# Patient Record
Sex: FEMALE | Race: ASIAN | NOT HISPANIC OR LATINO | Employment: OTHER | URBAN - METROPOLITAN AREA
[De-identification: names, ages, dates, MRNs, and addresses within clinical notes are randomized per-mention and may not be internally consistent; named-entity substitution may affect disease eponyms.]

---

## 2018-10-11 ENCOUNTER — TRANSFERRED RECORDS (OUTPATIENT)
Dept: HEALTH INFORMATION MANAGEMENT | Facility: CLINIC | Age: 73
End: 2018-10-11
Payer: MEDICARE

## 2018-10-18 ENCOUNTER — TRANSFERRED RECORDS (OUTPATIENT)
Dept: HEALTH INFORMATION MANAGEMENT | Facility: CLINIC | Age: 73
End: 2018-10-18
Payer: MEDICARE

## 2021-06-03 ENCOUNTER — RECORDS - HEALTHEAST (OUTPATIENT)
Dept: ADMINISTRATIVE | Facility: CLINIC | Age: 76
End: 2021-06-03

## 2022-02-09 ENCOUNTER — HOSPITAL ENCOUNTER (INPATIENT)
Facility: HOSPITAL | Age: 77
LOS: 2 days | Discharge: HOME | DRG: 191 | End: 2022-02-12
Attending: EMERGENCY MEDICINE | Admitting: INTERNAL MEDICINE
Payer: MEDICARE

## 2022-02-09 ENCOUNTER — APPOINTMENT (OUTPATIENT)
Dept: CT IMAGING | Facility: HOSPITAL | Age: 77
DRG: 191 | End: 2022-02-09
Attending: EMERGENCY MEDICINE
Payer: MEDICARE

## 2022-02-09 DIAGNOSIS — J44.1 COPD EXACERBATION (H): ICD-10-CM

## 2022-02-09 DIAGNOSIS — K59.01 SLOW TRANSIT CONSTIPATION: ICD-10-CM

## 2022-02-09 DIAGNOSIS — F51.01 PRIMARY INSOMNIA: Primary | ICD-10-CM

## 2022-02-09 DIAGNOSIS — B96.20 E. COLI UTI: ICD-10-CM

## 2022-02-09 DIAGNOSIS — N39.0 E. COLI UTI: ICD-10-CM

## 2022-02-09 LAB
ALBUMIN UR-MCNC: NEGATIVE MG/DL
ANION GAP SERPL CALCULATED.3IONS-SCNC: 12 MMOL/L (ref 5–18)
APPEARANCE UR: CLEAR
ATRIAL RATE - MUSE: 97 BPM
BACTERIA #/AREA URNS HPF: ABNORMAL /HPF
BASOPHILS # BLD AUTO: 0.1 10E3/UL (ref 0–0.2)
BASOPHILS NFR BLD AUTO: 1 %
BILIRUB UR QL STRIP: NEGATIVE
BNP SERPL-MCNC: 55 PG/ML (ref 0–137)
BUN SERPL-MCNC: 12 MG/DL (ref 8–28)
C REACTIVE PROTEIN LHE: 4.9 MG/DL (ref 0–0.8)
CALCIUM SERPL-MCNC: 8.6 MG/DL (ref 8.5–10.5)
CHLORIDE BLD-SCNC: 104 MMOL/L (ref 98–107)
CO2 SERPL-SCNC: 20 MMOL/L (ref 22–31)
COLOR UR AUTO: ABNORMAL
CREAT SERPL-MCNC: 0.96 MG/DL (ref 0.6–1.1)
DIASTOLIC BLOOD PRESSURE - MUSE: NORMAL MMHG
EOSINOPHIL # BLD AUTO: 0.1 10E3/UL (ref 0–0.7)
EOSINOPHIL NFR BLD AUTO: 0 %
ERYTHROCYTE [DISTWIDTH] IN BLOOD BY AUTOMATED COUNT: 13.7 % (ref 10–15)
FLUAV RNA SPEC QL NAA+PROBE: NEGATIVE
FLUBV RNA RESP QL NAA+PROBE: NEGATIVE
GFR SERPL CREATININE-BSD FRML MDRD: 61 ML/MIN/1.73M2
GLUCOSE BLD-MCNC: 127 MG/DL (ref 70–125)
GLUCOSE UR STRIP-MCNC: NEGATIVE MG/DL
HCT VFR BLD AUTO: 39.1 % (ref 35–47)
HGB BLD-MCNC: 12.3 G/DL (ref 11.7–15.7)
HGB UR QL STRIP: ABNORMAL
IMM GRANULOCYTES # BLD: 0.1 10E3/UL
IMM GRANULOCYTES NFR BLD: 0 %
INTERPRETATION ECG - MUSE: NORMAL
KETONES UR STRIP-MCNC: NEGATIVE MG/DL
LACTATE SERPL-SCNC: 1.7 MMOL/L (ref 0.7–2)
LEUKOCYTE ESTERASE UR QL STRIP: ABNORMAL
LYMPHOCYTES # BLD AUTO: 2.3 10E3/UL (ref 0.8–5.3)
LYMPHOCYTES NFR BLD AUTO: 16 %
MCH RBC QN AUTO: 27 PG (ref 26.5–33)
MCHC RBC AUTO-ENTMCNC: 31.5 G/DL (ref 31.5–36.5)
MCV RBC AUTO: 86 FL (ref 78–100)
MONOCYTES # BLD AUTO: 1.7 10E3/UL (ref 0–1.3)
MONOCYTES NFR BLD AUTO: 12 %
NEUTROPHILS # BLD AUTO: 10.8 10E3/UL (ref 1.6–8.3)
NEUTROPHILS NFR BLD AUTO: 71 %
NITRATE UR QL: NEGATIVE
NRBC # BLD AUTO: 0 10E3/UL
NRBC BLD AUTO-RTO: 0 /100
P AXIS - MUSE: 72 DEGREES
PH UR STRIP: 5.5 [PH] (ref 5–7)
PLATELET # BLD AUTO: 266 10E3/UL (ref 150–450)
POTASSIUM BLD-SCNC: 3.7 MMOL/L (ref 3.5–5)
PR INTERVAL - MUSE: 138 MS
QRS DURATION - MUSE: 88 MS
QT - MUSE: 322 MS
QTC - MUSE: 408 MS
R AXIS - MUSE: 69 DEGREES
RBC # BLD AUTO: 4.56 10E6/UL (ref 3.8–5.2)
RBC URINE: 1 /HPF
SARS-COV-2 RNA RESP QL NAA+PROBE: NEGATIVE
SODIUM SERPL-SCNC: 136 MMOL/L (ref 136–145)
SP GR UR STRIP: >1.05 (ref 1–1.03)
SQUAMOUS EPITHELIAL: 3 /HPF
SYSTOLIC BLOOD PRESSURE - MUSE: NORMAL MMHG
T AXIS - MUSE: 78 DEGREES
TROPONIN I SERPL-MCNC: <0.01 NG/ML (ref 0–0.29)
UROBILINOGEN UR STRIP-MCNC: <2 MG/DL
VENTRICULAR RATE- MUSE: 97 BPM
WBC # BLD AUTO: 15 10E3/UL (ref 4–11)
WBC URINE: 17 /HPF

## 2022-02-09 PROCEDURE — 85014 HEMATOCRIT: CPT | Performed by: EMERGENCY MEDICINE

## 2022-02-09 PROCEDURE — 250N000013 HC RX MED GY IP 250 OP 250 PS 637: Performed by: INTERNAL MEDICINE

## 2022-02-09 PROCEDURE — C9803 HOPD COVID-19 SPEC COLLECT: HCPCS

## 2022-02-09 PROCEDURE — 83880 ASSAY OF NATRIURETIC PEPTIDE: CPT | Performed by: EMERGENCY MEDICINE

## 2022-02-09 PROCEDURE — 86140 C-REACTIVE PROTEIN: CPT | Performed by: EMERGENCY MEDICINE

## 2022-02-09 PROCEDURE — 81001 URINALYSIS AUTO W/SCOPE: CPT | Performed by: EMERGENCY MEDICINE

## 2022-02-09 PROCEDURE — 87086 URINE CULTURE/COLONY COUNT: CPT | Performed by: EMERGENCY MEDICINE

## 2022-02-09 PROCEDURE — 82374 ASSAY BLOOD CARBON DIOXIDE: CPT | Performed by: EMERGENCY MEDICINE

## 2022-02-09 PROCEDURE — 94640 AIRWAY INHALATION TREATMENT: CPT

## 2022-02-09 PROCEDURE — 93005 ELECTROCARDIOGRAM TRACING: CPT | Performed by: EMERGENCY MEDICINE

## 2022-02-09 PROCEDURE — 83605 ASSAY OF LACTIC ACID: CPT | Performed by: EMERGENCY MEDICINE

## 2022-02-09 PROCEDURE — 87636 SARSCOV2 & INF A&B AMP PRB: CPT | Performed by: EMERGENCY MEDICINE

## 2022-02-09 PROCEDURE — 84443 ASSAY THYROID STIM HORMONE: CPT | Performed by: INTERNAL MEDICINE

## 2022-02-09 PROCEDURE — 99285 EMERGENCY DEPT VISIT HI MDM: CPT | Mod: 25

## 2022-02-09 PROCEDURE — 71275 CT ANGIOGRAPHY CHEST: CPT

## 2022-02-09 PROCEDURE — 96374 THER/PROPH/DIAG INJ IV PUSH: CPT | Mod: 59

## 2022-02-09 PROCEDURE — G0378 HOSPITAL OBSERVATION PER HR: HCPCS

## 2022-02-09 PROCEDURE — 250N000009 HC RX 250: Performed by: INTERNAL MEDICINE

## 2022-02-09 PROCEDURE — 250N000013 HC RX MED GY IP 250 OP 250 PS 637: Performed by: EMERGENCY MEDICINE

## 2022-02-09 PROCEDURE — 83036 HEMOGLOBIN GLYCOSYLATED A1C: CPT | Performed by: INTERNAL MEDICINE

## 2022-02-09 PROCEDURE — 250N000009 HC RX 250: Performed by: EMERGENCY MEDICINE

## 2022-02-09 PROCEDURE — 36415 COLL VENOUS BLD VENIPUNCTURE: CPT | Performed by: EMERGENCY MEDICINE

## 2022-02-09 PROCEDURE — 84484 ASSAY OF TROPONIN QUANT: CPT | Performed by: EMERGENCY MEDICINE

## 2022-02-09 PROCEDURE — 99220 PR INITIAL OBSERVATION CARE,LEVEL III: CPT | Performed by: INTERNAL MEDICINE

## 2022-02-09 PROCEDURE — 82947 ASSAY GLUCOSE BLOOD QUANT: CPT | Performed by: EMERGENCY MEDICINE

## 2022-02-09 PROCEDURE — 250N000011 HC RX IP 250 OP 636: Performed by: EMERGENCY MEDICINE

## 2022-02-09 PROCEDURE — 87040 BLOOD CULTURE FOR BACTERIA: CPT | Performed by: EMERGENCY MEDICINE

## 2022-02-09 RX ORDER — ALBUTEROL SULFATE 90 UG/1
2 AEROSOL, METERED RESPIRATORY (INHALATION) EVERY 6 HOURS PRN
Status: DISCONTINUED | OUTPATIENT
Start: 2022-02-09 | End: 2022-02-12 | Stop reason: HOSPADM

## 2022-02-09 RX ORDER — ACETAMINOPHEN 325 MG/1
650 TABLET ORAL EVERY 6 HOURS PRN
Status: DISCONTINUED | OUTPATIENT
Start: 2022-02-09 | End: 2022-02-12 | Stop reason: HOSPADM

## 2022-02-09 RX ORDER — ALBUTEROL SULFATE 0.83 MG/ML
2.5 SOLUTION RESPIRATORY (INHALATION)
Status: DISCONTINUED | OUTPATIENT
Start: 2022-02-09 | End: 2022-02-12 | Stop reason: HOSPADM

## 2022-02-09 RX ORDER — ALBUTEROL SULFATE 5 MG/ML
2.5 SOLUTION RESPIRATORY (INHALATION) ONCE
Status: COMPLETED | OUTPATIENT
Start: 2022-02-09 | End: 2022-02-09

## 2022-02-09 RX ORDER — IOPAMIDOL 755 MG/ML
100 INJECTION, SOLUTION INTRAVASCULAR ONCE
Status: COMPLETED | OUTPATIENT
Start: 2022-02-09 | End: 2022-02-09

## 2022-02-09 RX ORDER — METHYLPREDNISOLONE SODIUM SUCCINATE 40 MG/ML
40 INJECTION, POWDER, LYOPHILIZED, FOR SOLUTION INTRAMUSCULAR; INTRAVENOUS DAILY
Status: DISCONTINUED | OUTPATIENT
Start: 2022-02-10 | End: 2022-02-11

## 2022-02-09 RX ORDER — ATORVASTATIN CALCIUM 40 MG/1
40 TABLET, FILM COATED ORAL DAILY
Status: DISCONTINUED | OUTPATIENT
Start: 2022-02-10 | End: 2022-02-12 | Stop reason: HOSPADM

## 2022-02-09 RX ORDER — METHYLPREDNISOLONE SODIUM SUCCINATE 125 MG/2ML
125 INJECTION, POWDER, LYOPHILIZED, FOR SOLUTION INTRAMUSCULAR; INTRAVENOUS ONCE
Status: COMPLETED | OUTPATIENT
Start: 2022-02-09 | End: 2022-02-09

## 2022-02-09 RX ORDER — ASPIRIN 81 MG/1
81 TABLET ORAL DAILY
Status: DISCONTINUED | OUTPATIENT
Start: 2022-02-10 | End: 2022-02-12 | Stop reason: HOSPADM

## 2022-02-09 RX ORDER — IPRATROPIUM BROMIDE AND ALBUTEROL SULFATE 2.5; .5 MG/3ML; MG/3ML
3 SOLUTION RESPIRATORY (INHALATION) ONCE
Status: COMPLETED | OUTPATIENT
Start: 2022-02-09 | End: 2022-02-09

## 2022-02-09 RX ORDER — DOXYCYCLINE 100 MG/1
100 CAPSULE ORAL 2 TIMES DAILY
Status: DISCONTINUED | OUTPATIENT
Start: 2022-02-09 | End: 2022-02-12 | Stop reason: HOSPADM

## 2022-02-09 RX ORDER — ASPIRIN 81 MG/1
81 TABLET ORAL DAILY
COMMUNITY

## 2022-02-09 RX ORDER — METHYLPREDNISOLONE SODIUM SUCCINATE 125 MG/2ML
60 INJECTION, POWDER, LYOPHILIZED, FOR SOLUTION INTRAMUSCULAR; INTRAVENOUS ONCE
Status: DISCONTINUED | OUTPATIENT
Start: 2022-02-09 | End: 2022-02-10

## 2022-02-09 RX ORDER — IPRATROPIUM BROMIDE AND ALBUTEROL SULFATE 2.5; .5 MG/3ML; MG/3ML
3 SOLUTION RESPIRATORY (INHALATION)
Status: DISCONTINUED | OUTPATIENT
Start: 2022-02-09 | End: 2022-02-10

## 2022-02-09 RX ORDER — AMLODIPINE BESYLATE 5 MG/1
5 TABLET ORAL DAILY
Status: DISCONTINUED | OUTPATIENT
Start: 2022-02-10 | End: 2022-02-12 | Stop reason: HOSPADM

## 2022-02-09 RX ORDER — ATORVASTATIN CALCIUM 40 MG/1
40 TABLET, FILM COATED ORAL DAILY
COMMUNITY
End: 2022-04-20

## 2022-02-09 RX ADMIN — IPRATROPIUM BROMIDE AND ALBUTEROL SULFATE 3 ML: 2.5; .5 SOLUTION RESPIRATORY (INHALATION) at 23:22

## 2022-02-09 RX ADMIN — ALBUTEROL SULFATE 2.5 MG: 2.5 SOLUTION RESPIRATORY (INHALATION) at 17:27

## 2022-02-09 RX ADMIN — ALBUTEROL SULFATE 2 PUFF: 90 AEROSOL, METERED RESPIRATORY (INHALATION) at 15:43

## 2022-02-09 RX ADMIN — DOXYCYCLINE 100 MG: 100 CAPSULE ORAL at 20:25

## 2022-02-09 RX ADMIN — IPRATROPIUM BROMIDE AND ALBUTEROL SULFATE 3 ML: 2.5; .5 SOLUTION RESPIRATORY (INHALATION) at 16:07

## 2022-02-09 RX ADMIN — METHYLPREDNISOLONE SODIUM SUCCINATE 125 MG: 125 INJECTION, POWDER, FOR SOLUTION INTRAMUSCULAR; INTRAVENOUS at 16:08

## 2022-02-09 RX ADMIN — ACETAMINOPHEN 650 MG: 325 TABLET ORAL at 20:25

## 2022-02-09 RX ADMIN — IOPAMIDOL 100 ML: 755 INJECTION, SOLUTION INTRAVENOUS at 15:08

## 2022-02-09 ASSESSMENT — MIFFLIN-ST. JEOR: SCORE: 1031.14

## 2022-02-09 ASSESSMENT — ENCOUNTER SYMPTOMS
CONFUSION: 0
ARTHRALGIAS: 1
HEADACHES: 0
COLOR CHANGE: 0
NECK STIFFNESS: 0
ABDOMINAL PAIN: 0
EYE REDNESS: 0
COUGH: 1
SHORTNESS OF BREATH: 1
DIFFICULTY URINATING: 0
FEVER: 0

## 2022-02-09 ASSESSMENT — ACTIVITIES OF DAILY LIVING (ADL): DEPENDENT_IADLS:: TRANSPORTATION

## 2022-02-09 NOTE — PHARMACY-ADMISSION MEDICATION HISTORY
Pharmacy Note - Admission Medication History    Pertinent Provider Information: Medication history provided via daughter     ______________________________________________________________________    Prior To Admission (PTA) med list completed and updated in EMR.       PTA Med List   Medication Sig Last Dose     acetaminophen (TYLENOL) 325 MG tablet [ACETAMINOPHEN (TYLENOL) 325 MG TABLET] Take 650 mg by mouth every 6 (six) hours as needed for pain. Past Month at Unknown time     albuterol (PROVENTIL HFA;VENTOLIN HFA) 90 mcg/actuation inhaler [ALBUTEROL (PROVENTIL HFA;VENTOLIN HFA) 90 MCG/ACTUATION INHALER] Inhale 2 puffs every 6 (six) hours as needed for wheezing. 2/9/2022 at am     amLODIPine (NORVASC) 5 MG tablet [AMLODIPINE (NORVASC) 5 MG TABLET] Take 5 mg by mouth daily. 2/9/2022 at am     aspirin 81 MG EC tablet Take 81 mg by mouth daily 2/9/2022 at am     atorvastatin (LIPITOR) 40 MG tablet Take 40 mg by mouth daily 2/8/2022 at pm       Information source(s): Family member  Method of interview communication: phone    Summary of Changes to PTA Med List  New: atorvastatin, aspirin   Discontinued: symbicort, monetlukast, cetirizine  Changed: -    Patient was asked about OTC/herbal products specifically.  PTA med list reflects this.    In the past week, patient estimated taking medication this percent of the time:  50-90% due to daughter not aware of compliance .    Allergies were reviewed, assessed, and updated with the patient.      Patient did not bring any medications to the hospital and can't retrieve from home. No multi-dose medications are available for use during hospital stay.     The information provided in this note is only as accurate as the sources available at the time of the update(s).    Thank you for the opportunity to participate in the care of this patient.    AYANA STOCK RPH  2/9/2022 5:38 PM

## 2022-02-09 NOTE — ED PROVIDER NOTES
ED Provider In Triage Note  [facility]  Encounter Date: Feb 9, 2022    Chief Complaint   Patient presents with     Shortness of Breath       Brief HPI:   Rhett Alvarado is a 76 year old female, history of COPD, lung mass, HTN and hyponatremia, presenting to the Emergency Department with a chief complaint of SOB. Has chronic SOB, but worse than baseline x 2 days. No chest pain, but reports pain to the right posterior shoulder area x 2 days. Mild cough. Subjective fever last night with chills.    Has been vaccinated for COVID.    Brief Physical Exam:  /78   Pulse 106   Temp 100  F (37.8  C) (Oral)   Resp 19   SpO2 95%   General: Mild increased work of breathing with tachypnea  HEENT: Atraumatic  Resp: Mild increased work of breathing with tachypnea; she has diffuse, faint inspiratory and expiratory wheezes; no audible rhonchi or rales.  Cardiac: Tachycardic rate with regular rhythm  Abdomen: soft, non-tender  Neuro: Alert, oriented, answers questions appropriately; cranial nerves grossly intact, no focal motor deficits  Psych: Behavior appropriate      Plan Initiated in Triage:  Cardiac evaluation; CT chest; COVID    Low-grade fever - will also check blood cultures, lactate    PIT Dispo:   Room asap    Alejandra Allen MD on 2/9/2022 at 1:31 PM    Patient was evaluated by the Physician in Triage due to a limitation of available rooms in the Emergency Department. A plan of care was discussed based on the information obtained on the initial evaluation and patient was consuled to return back to the Emergency Department lobby after this initial evalutaiton until results were obtained or a room became available in the Emergency Department. Patient was counseled not to leave prior to receiving the results of their workup.        Alejandra Allen MD  02/09/22 0494

## 2022-02-09 NOTE — ED TRIAGE NOTES
Patient presents with shortness of breath and right shoulder/scapular pain that has occurred over the past two days. She notes a slight cough. She states that she felt cold and intermittently feverish. She is vaccinated for Covid 19 with booster. Patient reports some relief with inhaler use.    Patient's daughter to stay with her for support.

## 2022-02-09 NOTE — H&P
Admission History and Physical   Rhett Rader,  1945, MRN [id number]    [facility]  COPD exacerbation (H) [J44.1]    PCP: No Ref-Primary, Physician, None   Code status:  No Order       Extended Emergency Contact Information  Primary Emergency Contact: Shilpa De Santiago   [facility]  Home Phone: [phone]  Relation: Daughter  Secondary Emergency Contact: JIMBO RADER  Home Phone: [phone]  Relation: Son       Assessment and Plan   Rhett Rader is a 76 year old female with history of diffuse large B-cell type Non-Hodgkin lymphoma (dx in , in remission s/p chemotherapy), COPD, tuberculosis treated with antituberculosis chemotherapy, and HTN, who presents via private vehicle with daughter for evaluation of shortness of breath.      Acute COPD exacerbation  URI  -Steroid; Nebulizer and antibiotics   -o2 as needed    H/o diffuse large B-cell type Non-Hodgkin lymphoma  -in remission    TB  -s/p antituberculosis    Pulmonary arterial hypertension.    Stable ascending aortic 4.2 cm aneurysm.    DVT Prophylaxis: subcutaneous heparin  Diet: None   regular  Central Lines: None     Hugo Catheter: Not present       Disposition: Admitted inpatient. Anticipated Length of Stay in midnights (including a midnight in the Emergency Department after triage if applicable) is more than 2 nights          Chief Complaint: sob     HPI:    Rhett Rader is a 76 year old female with history of diffuse large B-cell type Non-Hodgkin lymphoma (dx in , in remission s/p chemotherapy), COPD, tuberculosis treated with antituberculosis chemotherapy, and HTN, who presents via private vehicle with daughter for evaluation of shortness of breath.       Patient endorses chronic dyspnea that progressively worsened two days ago. She has been using her albuterol inhalers at home with mild relief. She also reports a mild cough and right posterior shoulder pain. Denies fevers. Patient is COVID-19 vaccinated x3.         History was obtained from  pt/chart review.      Medical History  No past medical history on file.  COPD  diffuse large B-cell type Non-Hodgkin lymphoma; h/o TB s/p tx Surgical History  She  has a past surgical history that includes IR Miscellaneous Procedure (2/20/2012) and IR Port Removal (7/20/2012).       Social History  Reviewed, and she  reports that she has never smoked. She does not have any smokeless tobacco history on file. She reports that she does not drink alcohol.       Allergies  No Known Allergies Family History  Reviewed, and family history includes No Known Problems in her daughter, father, maternal aunt, maternal grandfather, maternal grandmother, mother, paternal aunt, paternal grandfather, paternal grandmother, and sister.  Not pertinent to chief complaints or current medical problems.        Prior to Admission Medications   (Not in a hospital admission)    Await review     Review of Systems:  A 12 point comprehensive review of systems was negative except as noted.    ROS  Physical Exam:  Temp:  [100  F (37.8  C)] 100  F (37.8  C)  Pulse:  [] 91  Resp:  [19-24] 24  BP: ()/(51-78) 107/56  SpO2:  [93 %-100 %] 93 %    /56   Pulse 91   Temp 100  F (37.8  C) (Oral)   Resp 24   SpO2 93%     Physical Exam  Vitals and nursing note reviewed.   Constitutional:       General: She is not in acute distress.     Appearance: She is not toxic-appearing or diaphoretic.   HENT:      Head: Normocephalic and atraumatic.      Right Ear: External ear normal.      Left Ear: External ear normal.      Nose: Nose normal.      Mouth/Throat:      Mouth: Mucous membranes are moist.   Eyes:      General: No scleral icterus.        Right eye: No discharge.         Left eye: No discharge.      Extraocular Movements: Extraocular movements intact.      Pupils: Pupils are equal, round, and reactive to light.   Cardiovascular:      Rate and Rhythm: Normal rate and regular rhythm.      Pulses: Normal pulses.      Heart sounds: No murmur  heard.      Pulmonary:      Effort: Respiratory distress present.      Breath sounds: No stridor. Wheezing present. No rhonchi or rales.   Abdominal:      General: Abdomen is flat. There is no distension.   Musculoskeletal:      Cervical back: Neck supple. No rigidity.      Right lower leg: No edema.      Left lower leg: No edema.   Skin:     General: Skin is warm and dry.      Coloration: Skin is not jaundiced.   Neurological:      General: No focal deficit present.      Mental Status: She is alert. Mental status is at baseline.      Cranial Nerves: No cranial nerve deficit.   Psychiatric:         Mood and Affect: Mood normal.               Pertinent Labs  Lab Results: personally reviewed.   Results for LIZZETTE RADER (MRN [id number]) as of 2/9/2022 17:56   Ref. Range 2/9/2022 13:42 2/9/2022 14:02   Sodium Latest Ref Range: 136 - 145 mmol/L  136   Potassium Latest Ref Range: 3.5 - 5.0 mmol/L  3.7   Chloride Latest Ref Range: 98 - 107 mmol/L  104   Carbon Dioxide Latest Ref Range: 22 - 31 mmol/L  20 (L)   Urea Nitrogen Latest Ref Range: 8 - 28 mg/dL  12   Creatinine Latest Ref Range: 0.60 - 1.10 mg/dL  0.96   GFR Estimate Latest Ref Range: >60 mL/min/1.73m2  61   Calcium Latest Ref Range: 8.5 - 10.5 mg/dL  8.6   Anion Gap Latest Ref Range: 5 - 18 mmol/L  12   BNP Latest Ref Range: 0 - 137 pg/mL  55   CRP Latest Ref Range: 0.0-<0.8 mg/dL  4.9 (H)   Lactic Acid Latest Ref Range: 0.7 - 2.0 mmol/L  1.7   Troponin I Latest Ref Range: 0.00 - 0.29 ng/mL  <0.01   Glucose Latest Ref Range: 70 - 125 mg/dL  127 (H)   WBC Latest Ref Range: 4.0 - 11.0 10e3/uL  15.0 (H)   Hemoglobin Latest Ref Range: 11.7 - 15.7 g/dL  12.3   Hematocrit Latest Ref Range: 35.0 - 47.0 %  39.1   Platelet Count Latest Ref Range: 150 - 450 10e3/uL  266   RBC Count Latest Ref Range: 3.80 - 5.20 10e6/uL  4.56   MCV Latest Ref Range: 78 - 100 fL  86   MCH Latest Ref Range: 26.5 - 33.0 pg  27.0   MCHC Latest Ref Range: 31.5 - 36.5 g/dL  31.5   RDW Latest Ref  Range: 10.0 - 15.0 %  13.7   % Neutrophils Latest Units: %  71   % Lymphocytes Latest Units: %  16   % Monocytes Latest Units: %  12   % Eosinophils Latest Units: %  0   % Basophils Latest Units: %  1   Absolute Basophils Latest Ref Range: 0.0 - 0.2 10e3/uL  0.1   Absolute Eosinophils Latest Ref Range: 0.0 - 0.7 10e3/uL  0.1   Absolute Immature Granulocytes Latest Ref Range: <=0.4 10e3/uL  0.1   Absolute Lymphocytes Latest Ref Range: 0.8 - 5.3 10e3/uL  2.3   Absolute Monocytes Latest Ref Range: 0.0 - 1.3 10e3/uL  1.7 (H)   % Immature Granulocytes Latest Units: %  0   Absolute Neutrophils Latest Ref Range: 1.6 - 8.3 10e3/uL  10.8 (H)   Absolute NRBCs Latest Units: 10e3/uL  0.0   NRBCs per 100 WBC Latest Ref Range: <1 /100  0   BLOOD CULTURE Unknown  Rpt   SARS CoV2 PCR Latest Ref Range: Negative  Negative    Influenza A Latest Ref Range: Negative  Negative    Influenza B Latest Ref Range: Negative  Negative      Pertinent Radiology  Radiology Results: Personally reviewed impression/s  EXAM: CT CHEST PULMONARY EMBOLISM W CONTRAST  LOCATION: [facility]  DATE/TIME: 2/9/2022 2:59 PM     INDICATION: Dyspnea. Tachycardia. Hypoxia.  COMPARISON: CT 08/05/2014 and 01/28/2013                                                                   IMPRESSION:  1.  No evidence pulmonary embolism.  2.  Pulmonary arterial hypertension.  3.  Stable ascending aortic 4.2 cm aneurysm.  4.  Presumed chronic mediastinitis causing right middle lobe, and left upper lobe chronic atelectasis and scar.  5.  No acute pulmonary abnormality seen.    EKG Results: personally reviewed.   I have independently reviewed and interpreted the EKG.    Advanced Care Planning  Discharge planning discussed with patient        [facility]    Benjamin Whitlock MD  Office: [phone]

## 2022-02-09 NOTE — ED PROVIDER NOTES
EMERGENCY DEPARTMENT ENCOUNTER     NAME: Rhett Alvarado   AGE: 76 year old female   YOB: 1945   MRN: [id number]   EVALUATION DATE & TIME: No admission date for patient encounter.   PCP: No Ref-Primary, Physician     Chief Complaint   Patient presents with     Shortness of Breath   :    FINAL IMPRESSION       1. COPD exacerbation (H)           ED COURSE & MEDICAL DECISION MAKING      Pertinent Labs & Imaging studies reviewed. (See chart for details)   76 year old female  presents to the Emergency Department for evaluation of shortness of breath for about 1 week. Initial Vitals Reviewed. Initial exam notable for patient had diminished breath sounds at the left side throughout, inspiratory and expiratory wheezing bilaterally and was slightly dyspneic. Oxygen saturations were in the low 90s. She is not febrile but she does have a borderline temperature at 100.0 degrees. I considered a respiratory illness including influenza, Covid, pneumonia, pulmonary embolus, pulmonary edema, COPD exacerbation with a history of this. CT PE is negative for acute pathology, and Covid and influenza testing is negative. EKG does not show any acute arrhythmia or ischemic changes. Ultimately, I did treat with a DuoNeb with improvement of the aeration and her breathing, but she continues to have pretty significant inspiratory and expiratory wheezing bilaterally. I suspect that his COPD exacerbation is the source of her symptoms, so I started IV Solu-Medrol, additional nebulizer therapy, and discussed the case with hospitalist for admission.           At the conclusion of the encounter I discussed the results of all of the tests and the disposition. The questions were answered. The patient or family acknowledged understanding and was agreeable with the care plan.     2:35 PM I met with the patient, obtained history, performed an initial exam, and discussed options and plan for diagnostics and treatment here in the ED.   5:00 PM I  rechecked the patient and she continues to have wheezing despite receiving Solumedrol and Duoneb. We discussed the plan for admission and she is agreeable with this. I paged for the hospitalist.  5:17 PM I spoke with Dr. Whitlock, hospitalist, here in the ED and he accepts the patient for admission.          MEDICATIONS GIVEN IN THE EMERGENCY:   Medications   albuterol (PROVENTIL HFA/VENTOLIN HFA) inhaler (2 puffs Inhalation Given 2/9/22 1543)   methylPREDNISolone sodium succinate (solu-MEDROL) injection 62.5 mg (62.5 mg Intravenous Not Given 2/9/22 1609)   iopamidol (ISOVUE-370) solution 100 mL (100 mLs Intravenous Given 2/9/22 1508)   ipratropium - albuterol 0.5 mg/2.5 mg/3 mL (DUONEB) neb solution 3 mL (3 mLs Nebulization Given 2/9/22 1607)   methylPREDNISolone sodium succinate (solu-MEDROL) injection 125 mg (125 mg Intravenous Given 2/9/22 1608)   albuterol (PROVENTIL) neb solution 2.5 mg (2.5 mg Nebulization Given 2/9/22 1727)      NEW PRESCRIPTIONS STARTED AT TODAY'S ER VISIT   New Prescriptions    No medications on file     ================================================================   HISTORY OF PRESENT ILLNESS       Patient information was obtained from: patient   Use of Intrepreter: N/A     Rhett KELSI Alvarado is a 76 year old female with history of diffuse large B-cell type Non-Hodgkin lymphoma (dx in 2011, in remission s/p chemotherapy), COPD, tuberculosis treated with antituberculosis chemotherapy, and HTN, who presents via private vehicle with daughter for evaluation of shortness of breath.      Patient endorses chronic dyspnea that progressively worsened two days ago. She has been using her albuterol inhalers at home with mild relief. She also reports a mild cough and right posterior shoulder pain. Denies fevers. Patient is COVID-19 vaccinated x3.     ================================================================    REVIEW OF SYSTEMS       Review of Systems   Constitutional: Negative for fever.   HENT:  Negative for congestion.    Eyes: Negative for redness.   Respiratory: Positive for cough (mild) and shortness of breath.    Cardiovascular: Negative for chest pain.   Gastrointestinal: Negative for abdominal pain.   Genitourinary: Negative for difficulty urinating.   Musculoskeletal: Positive for arthralgias (right posterior shoulder). Negative for neck stiffness.   Skin: Negative for color change.   Neurological: Negative for headaches.   Psychiatric/Behavioral: Negative for confusion.   All other systems reviewed and are negative.        PAST HISTORY     PAST MEDICAL HISTORY:   No past medical history on file.   PAST SURGICAL HISTORY:   Past Surgical History:   Procedure Laterality Date     IR MISCELLANEOUS PROCEDURE  2/20/2012     IR PORT REMOVAL  7/20/2012      CURRENT MEDICATIONS:   acetaminophen (TYLENOL) 325 MG tablet  albuterol (PROVENTIL HFA;VENTOLIN HFA) 90 mcg/actuation inhaler  amLODIPine (NORVASC) 5 MG tablet  aspirin 81 MG EC tablet  atorvastatin (LIPITOR) 40 MG tablet      ALLERGIES:   No Known Allergies   FAMILY HISTORY:   Family History   Problem Relation Age of Onset     No Known Problems Mother      No Known Problems Father      No Known Problems Sister      No Known Problems Daughter      No Known Problems Maternal Grandmother      No Known Problems Maternal Grandfather      No Known Problems Paternal Grandmother      No Known Problems Paternal Grandfather      No Known Problems Maternal Aunt      No Known Problems Paternal Aunt      Hereditary Breast and Ovarian Cancer Syndrome No family hx of      Breast Cancer No family hx of      Cancer No family hx of      Colon Cancer No family hx of      Endometrial Cancer No family hx of      Ovarian Cancer No family hx of       SOCIAL HISTORY:   Social History     Socioeconomic History     Marital status: Single     Spouse name: Not on file     Number of children: Not on file     Years of education: Not on file     Highest education level: Not on file    Occupational History     Not on file   Tobacco Use     Smoking status: Never Smoker     Smokeless tobacco: Not on file   Substance and Sexual Activity     Alcohol use: No     Drug use: Not on file     Sexual activity: Not on file   Other Topics Concern     Not on file   Social History Narrative     Not on file     Social Determinants of Health     Financial Resource Strain: Not on file   Food Insecurity: Not on file   Transportation Needs: Not on file   Physical Activity: Not on file   Stress: Not on file   Social Connections: Not on file   Intimate Partner Violence: Not on file   Housing Stability: Not on file        VITALS  Patient Vitals for the past 24 hrs:   BP Temp Temp src Pulse Resp SpO2   02/09/22 1800 108/55 -- -- 80 22 97 %   02/09/22 1745 -- -- -- 80 -- 99 %   02/09/22 1730 106/56 -- -- 85 -- 94 %   02/09/22 1715 107/56 -- -- 91 -- 93 %   02/09/22 1700 95/51 -- -- 88 -- 94 %   02/09/22 1645 106/56 -- -- 92 -- 94 %   02/09/22 1630 113/61 -- -- 94 -- 100 %   02/09/22 1615 114/60 -- -- 92 -- 100 %   02/09/22 1600 121/61 -- -- 96 -- 97 %   02/09/22 1545 116/64 100  F (37.8  C) Oral 98 24 95 %   02/09/22 1333 137/78 100  F (37.8  C) Oral 106 19 95 %        ================================================================    PHYSICAL EXAM     VITAL SIGNS: /55   Pulse 80   Temp 100  F (37.8  C) (Oral)   Resp 22   SpO2 97%    Constitutional:  Awake, no acute distress   HENT:  Atraumatic, oropharynx without exudate or erythema, membranes moist  Lymph:  No adenopathy  Eyes: EOM intact, PERRL, no injection  Neck: Supple  Respiratory: Diminished breath sounds on the left side. Mild inspiratory and expiratory wheezing bilaterally.   Cardiovascular:  Regular rate and rhythm, single S1 and S2   GI:  Soft, nontender, nondistended, no rebound or guarding   Musculoskeletal:  Moves all extremities, no lower extremity edema, no deformities    Skin:  Warm, dry  Neurologic:  Alert and oriented x3, no focal deficits  noted       ================================================================  LAB       All pertinent labs reviewed and interpreted.   Labs Ordered and Resulted from Time of ED Arrival to Time of ED Departure   CRP INFLAMMATION - Abnormal       Result Value    CRP 4.9 (*)    BASIC METABOLIC PANEL - Abnormal    Sodium 136      Potassium 3.7      Chloride 104      Carbon Dioxide (CO2) 20 (*)     Anion Gap 12      Urea Nitrogen 12      Creatinine 0.96      Calcium 8.6      Glucose 127 (*)     GFR Estimate 61     CBC WITH PLATELETS AND DIFFERENTIAL - Abnormal    WBC Count 15.0 (*)     RBC Count 4.56      Hemoglobin 12.3      Hematocrit 39.1      MCV 86      MCH 27.0      MCHC 31.5      RDW 13.7      Platelet Count 266      % Neutrophils 71      % Lymphocytes 16      % Monocytes 12      % Eosinophils 0      % Basophils 1      % Immature Granulocytes 0      NRBCs per 100 WBC 0      Absolute Neutrophils 10.8 (*)     Absolute Lymphocytes 2.3      Absolute Monocytes 1.7 (*)     Absolute Eosinophils 0.1      Absolute Basophils 0.1      Absolute Immature Granulocytes 0.1      Absolute NRBCs 0.0     ROUTINE UA WITH MICROSCOPIC REFLEX TO CULTURE - Abnormal    Color Urine Light Yellow      Appearance Urine Clear      Glucose Urine Negative      Bilirubin Urine Negative      Ketones Urine Negative      Specific Gravity Urine >1.050 (*)     Blood Urine 0.03 mg/dL (*)     pH Urine 5.5      Protein Albumin Urine Negative      Urobilinogen Urine <2.0      Nitrite Urine Negative      Leukocyte Esterase Urine 250 Santana/uL (*)     Bacteria Urine Few (*)     RBC Urine 1      WBC Urine 17 (*)     Squamous Epithelials Urine 3 (*)    INFLUENZA A/B & SARS-COV2 PCR MULTIPLEX - Normal    Influenza A PCR Negative      Influenza B PCR Negative      SARS CoV2 PCR Negative     LACTIC ACID WHOLE BLOOD - Normal    Lactic Acid 1.7     TROPONIN I - Normal    Troponin I <0.01     B-TYPE NATRIURETIC PEPTIDE ( EAST ONLY) - Normal    BNP 55     BLOOD  CULTURE   BLOOD CULTURE   URINE CULTURE        ===============================================================  RADIOLOGY       Reviewed all pertinent imaging. Please see official radiology report.   CT Chest Pulmonary Embolism w Contrast   Final Result   IMPRESSION:   1.  No evidence pulmonary embolism.   2.  Pulmonary arterial hypertension.   3.  Stable ascending aortic 4.2 cm aneurysm.   4.  Presumed chronic mediastinitis causing right middle lobe, and left upper lobe chronic atelectasis and scar.   5.  No acute pulmonary abnormality seen.            ================================================================  EKG     EKG reviewed interpreted by me shows sinus rhythm with rate of 97, normal axis,  with no acute ST or T wave changes    I have independently reviewed and interpreted the EKG(s) documented above.     ================================================================  PROCEDURES         I, Bhavna Perez, am serving as a scribe to document services personally performed by Dr. Moya based on my observation and the provider's statements to me. I, Catherine Moya MD attest that Bhavna Perez is acting in a scribe capacity, has observed my performance of the services and has documented them in accordance with my direction.     Catherine Moya M.D.   Emergency Medicine   [facility] EMERGENCY DEPARTMENT  [facility] [facility] [id number] [phone]  Dept: [phone]       Catherine Moya MD  02/09/22 1905

## 2022-02-10 ENCOUNTER — APPOINTMENT (OUTPATIENT)
Dept: INTERPRETER SERVICES | Facility: CLINIC | Age: 77
End: 2022-02-10
Payer: MEDICARE

## 2022-02-10 PROBLEM — N39.0 URINARY TRACT INFECTION: Status: ACTIVE | Noted: 2022-02-10

## 2022-02-10 LAB
GLUCOSE BLDC GLUCOMTR-MCNC: 154 MG/DL (ref 70–99)
GLUCOSE BLDC GLUCOMTR-MCNC: 172 MG/DL (ref 70–99)
HBA1C MFR BLD: 6.2 %
LACTATE SERPL-SCNC: 3 MMOL/L (ref 0.7–2)
TSH SERPL DL<=0.005 MIU/L-ACNC: 1.92 UIU/ML (ref 0.3–5)

## 2022-02-10 PROCEDURE — 96376 TX/PRO/DX INJ SAME DRUG ADON: CPT

## 2022-02-10 PROCEDURE — 250N000011 HC RX IP 250 OP 636: Performed by: INTERNAL MEDICINE

## 2022-02-10 PROCEDURE — 83605 ASSAY OF LACTIC ACID: CPT | Performed by: INTERNAL MEDICINE

## 2022-02-10 PROCEDURE — 96375 TX/PRO/DX INJ NEW DRUG ADDON: CPT

## 2022-02-10 PROCEDURE — G0378 HOSPITAL OBSERVATION PER HR: HCPCS

## 2022-02-10 PROCEDURE — 250N000011 HC RX IP 250 OP 636: Performed by: STUDENT IN AN ORGANIZED HEALTH CARE EDUCATION/TRAINING PROGRAM

## 2022-02-10 PROCEDURE — 99232 SBSQ HOSP IP/OBS MODERATE 35: CPT | Performed by: INTERNAL MEDICINE

## 2022-02-10 PROCEDURE — 999N000157 HC STATISTIC RCP TIME EA 10 MIN

## 2022-02-10 PROCEDURE — 250N000013 HC RX MED GY IP 250 OP 250 PS 637: Performed by: INTERNAL MEDICINE

## 2022-02-10 PROCEDURE — 250N000009 HC RX 250: Performed by: INTERNAL MEDICINE

## 2022-02-10 PROCEDURE — 99207 PR CDG-MDM COMPONENT: MEETS MODERATE - DOWN CODED: CPT | Performed by: INTERNAL MEDICINE

## 2022-02-10 PROCEDURE — 36415 COLL VENOUS BLD VENIPUNCTURE: CPT | Performed by: INTERNAL MEDICINE

## 2022-02-10 PROCEDURE — 120N000001 HC R&B MED SURG/OB

## 2022-02-10 RX ORDER — IPRATROPIUM BROMIDE AND ALBUTEROL SULFATE 2.5; .5 MG/3ML; MG/3ML
3 SOLUTION RESPIRATORY (INHALATION)
Status: DISCONTINUED | OUTPATIENT
Start: 2022-02-10 | End: 2022-02-12 | Stop reason: HOSPADM

## 2022-02-10 RX ORDER — ONDANSETRON 2 MG/ML
4 INJECTION INTRAMUSCULAR; INTRAVENOUS EVERY 6 HOURS PRN
Status: DISCONTINUED | OUTPATIENT
Start: 2022-02-10 | End: 2022-02-12 | Stop reason: HOSPADM

## 2022-02-10 RX ORDER — DIPHENHYDRAMINE HCL 25 MG
25 TABLET ORAL AT BEDTIME
Status: DISCONTINUED | OUTPATIENT
Start: 2022-02-10 | End: 2022-02-12 | Stop reason: HOSPADM

## 2022-02-10 RX ORDER — DEXTROSE MONOHYDRATE 25 G/50ML
25-50 INJECTION, SOLUTION INTRAVENOUS
Status: DISCONTINUED | OUTPATIENT
Start: 2022-02-10 | End: 2022-02-12 | Stop reason: HOSPADM

## 2022-02-10 RX ORDER — NICOTINE POLACRILEX 4 MG
15-30 LOZENGE BUCCAL
Status: DISCONTINUED | OUTPATIENT
Start: 2022-02-10 | End: 2022-02-12 | Stop reason: HOSPADM

## 2022-02-10 RX ORDER — CEFTRIAXONE 1 G/1
1 INJECTION, POWDER, FOR SOLUTION INTRAMUSCULAR; INTRAVENOUS EVERY 24 HOURS
Status: DISCONTINUED | OUTPATIENT
Start: 2022-02-10 | End: 2022-02-12

## 2022-02-10 RX ORDER — DIPHENHYDRAMINE HCL 25 MG
25 TABLET ORAL AT BEDTIME
Status: DISCONTINUED | OUTPATIENT
Start: 2022-02-10 | End: 2022-02-10

## 2022-02-10 RX ADMIN — DIPHENHYDRAMINE HCL 25 MG: 25 TABLET ORAL at 21:39

## 2022-02-10 RX ADMIN — DOXYCYCLINE 100 MG: 100 CAPSULE ORAL at 09:48

## 2022-02-10 RX ADMIN — DOXYCYCLINE 100 MG: 100 CAPSULE ORAL at 21:39

## 2022-02-10 RX ADMIN — IPRATROPIUM BROMIDE AND ALBUTEROL SULFATE 3 ML: 2.5; .5 SOLUTION RESPIRATORY (INHALATION) at 16:46

## 2022-02-10 RX ADMIN — IPRATROPIUM BROMIDE AND ALBUTEROL SULFATE 3 ML: 2.5; .5 SOLUTION RESPIRATORY (INHALATION) at 05:16

## 2022-02-10 RX ADMIN — AMLODIPINE BESYLATE 5 MG: 5 TABLET ORAL at 09:06

## 2022-02-10 RX ADMIN — ALBUTEROL SULFATE 2 PUFF: 90 AEROSOL, METERED RESPIRATORY (INHALATION) at 10:50

## 2022-02-10 RX ADMIN — CEFTRIAXONE SODIUM 1 G: 1 INJECTION, POWDER, FOR SOLUTION INTRAMUSCULAR; INTRAVENOUS at 16:46

## 2022-02-10 RX ADMIN — ONDANSETRON 4 MG: 2 INJECTION INTRAMUSCULAR; INTRAVENOUS at 05:14

## 2022-02-10 RX ADMIN — METHYLPREDNISOLONE SODIUM SUCCINATE 40 MG: 40 INJECTION, POWDER, FOR SOLUTION INTRAMUSCULAR; INTRAVENOUS at 10:50

## 2022-02-10 RX ADMIN — ATORVASTATIN CALCIUM 40 MG: 40 TABLET, FILM COATED ORAL at 09:48

## 2022-02-10 RX ADMIN — IPRATROPIUM BROMIDE AND ALBUTEROL SULFATE 3 ML: 2.5; .5 SOLUTION RESPIRATORY (INHALATION) at 11:44

## 2022-02-10 RX ADMIN — ASPIRIN 81 MG: 81 TABLET, COATED ORAL at 09:48

## 2022-02-10 ASSESSMENT — ACTIVITIES OF DAILY LIVING (ADL)
FALL_HISTORY_WITHIN_LAST_SIX_MONTHS: NO
ADLS_ACUITY_SCORE: 5
ADLS_ACUITY_SCORE: 5
DRESSING/BATHING_DIFFICULTY: NO
TOILETING_ISSUES: NO
INTERPRETER_SERVICES_OFFERED_TO_THE_PATIENT: YES
ADLS_ACUITY_SCORE: 5
CONCENTRATING,_REMEMBERING_OR_MAKING_DECISIONS_DIFFICULTY: NO
PATIENT_/_FAMILY_COMMUNICATION_STYLE: SPOKEN LANGUAGE (NON-ENGLISH)
ADLS_ACUITY_SCORE: 12
HEARING_DIFFICULTY_OR_DEAF: NO
ADLS_ACUITY_SCORE: 5
DOING_ERRANDS_INDEPENDENTLY_DIFFICULTY: NO
WEAR_GLASSES_OR_BLIND: NO
WALKING_OR_CLIMBING_STAIRS_DIFFICULTY: NO
DIFFICULTY_COMMUNICATING: NO
DIFFICULTY_EATING/SWALLOWING: NO

## 2022-02-10 ASSESSMENT — MIFFLIN-ST. JEOR: SCORE: 1039.3

## 2022-02-10 NOTE — ED NOTES
"[facility] ED Handoff Report    ED Chief Complaint: SOB    ED Diagnosis:  (J44.1) COPD exacerbation (H)         PMH:  No past medical history on file.     Code Status:  Full Code     Falls Risk: No Band: Not applicable    Current Living Situation/Residence: lives in a house     Elimination Status: Continent: Yes     Activity Level: Independent    Patients Preferred Language:  Other: Hmong     Needed: Yes    Vital Signs:  /60   Pulse 91   Temp 100  F (37.8  C) (Oral)   Resp 22   Ht 1.346 m (4' 5\")   Wt 72.6 kg (160 lb)   SpO2 97%   BMI 40.05 kg/m       Cardiac Rhythm: NA    Pain Score: 0/10    Is the Patient Confused:  No    Last Food or Drink: 02/10/22 at 1230    Focused Assessment:  The pt has a productive cough with coarse lung sounds. She is alert and able to ambulate independently.     Tests Performed: Done: Labs and Imaging    Treatments Provided:  Medicatoin    Family Dynamics/Concerns: No    Family Updated On Visitor Policy: No    Plan of Care Communicated to Family: No    Who Was Updated about Plan of Care: Daughter Attempted to contact daughter but no answer    Belongings Checklist Done and Signed by Patient: Yes    Medications sent with patient: NA    Covid: asymptomatic , negative    Additional Information: MIKAEL    RN: Monica Urena   2/10/2022 4:35 PM       "

## 2022-02-10 NOTE — ED NOTES
Nursing assessment--    Pt here with increasing SOB.  Has a history of COPD.  COVID is negative and chest CT negative for PE. Pt speaks some conversational hmoung. Having some audible wheezing,  Receiving neb and solumedrol IV.

## 2022-02-10 NOTE — ED NOTES
Pt up to the bedside commode with standby assist.  Tolerated activity with a slight increase in wheezing and SOB noted.  Resolved with rest.

## 2022-02-10 NOTE — PROGRESS NOTES
"[facility]    Medicine Progress Note - Hospitalist Service    Date of Admission:  2/9/2022    Rhett Alvarado is a 76 year old female with BMH of diffuse large B-cell type Non-Hodgkin lymphoma (dx in 2011, in remission s/p chemotherapy), COPD, tuberculosis treated with antituberculosis chemotherapy, and HTN, admitted on 2/9/2022 with:    COPD with acute exacerbation.  Upper respiratory infection/acute bronchitis. Normal lactate 1.7, WBCs 15.0.  Symptoms improving. Continue Solu-Medrol, nebulizers, doxycycline/ceftriaxone.    Diffuse large B cell non-Hodgkin's lymphoma, in remission.    History of pulmonary tuberculosis, completed anti-TB chemotherapy.    Essential hypertension-on amlodipine.  HLD-on Lipitor.    Stable ascending aortic 4.2 cm aneurysm.    Suspect prediabetes. A1c 6.2.    Probable UTI. UA positive for leukoesterase, negative nitrates, pyuria at 17.  -On ceftriaxone. Follow-up UC.    Keloid scars of bilateral breasts, with intermittent pruritus.  -Bedtime Benadryl.  -Recall treatment per home regimen.       Diet: Regular Diet Adult    DVT Prophylaxis: Enoxaparin (Lovenox) SQ  Hugo Catheter: Not present  Central Lines: None  Cardiac Monitoring: None  Code Status:   Full    Disposition Plan   Expected Discharge: Home in 1-2 days.       The patient's care was discussed with the Bedside Nurse, Care Coordinator/ and Patient.    Cari Ramirez MD  Hospitalist Service  [facility]  Securely message with the Vocera Web Console (learn more here)  Text page via [employer] Paging/Directory         Clinically Significant Risk Factors Present on Admission               # Platelet Defect: home medication list includes an antiplatelet medication   # Severe Obesity: Estimated body mass index is 40.05 kg/m  as calculated from the following:    Height as of this encounter: 1.346 m (4' 5\").    Weight as of this encounter: 72.6 kg (160 lb).  "     ______________________________________________________________________    Interval History   Patient is new to me. Chart reviewed. Patient was seen and examined.  Professional  via the phone service, used for this encounter. Patient has reasonable English language proficiency.  He states feeling better since treated with antibiotics and nebs. Afebrile. Productive of small amount yellowish sputum cough. No abdominal pain, chest pain, nausea, vomiting, dysuria.    Data reviewed today: I reviewed all medications, new labs and imaging results over the last 24 hours. I personally reviewed    Physical Exam   Vital Signs:     BP: 104/60 Pulse: 91   Resp: 22 SpO2: 97 % O2 Device: None (Room air)    Weight: 160 lbs 0 oz  General: Alert and oriented x 3. Not in obvious distress.  HEENT: NC, AT. Neck- supple, No JVP elevation, lymphadenopathy or thyromegaly. Trachea-central.  Chest: Good air movement bilaterally, prolonged expiratory phase, scattered expiratory wheezes. No aspects of bilateral breast with keloid scars, two small keloid scars in the back.  Heart: S1S2 regular. No M/R/G.  Abdomen: Soft. NT, ND. No organomegaly. Bowel sounds- active.  Back: No spine tenderness. No CVA tenderness.  Extremities: No leg swelling. Peripheral pulses 2+ bilaterally.  Neuro: Cranial nerves 1-12 grossly normal. No focal neurological deficit    Data   Recent Labs   Lab 02/09/22  1402   WBC 15.0*   HGB 12.3   MCV 86         POTASSIUM 3.7   CHLORIDE 104   CO2 20*   BUN 12   CR 0.96   ANIONGAP 12   MARY 8.6   *     No results found for this or any previous visit (from the past 24 hour(s)).

## 2022-02-11 PROBLEM — B96.20 E. COLI UTI: Status: ACTIVE | Noted: 2022-02-10

## 2022-02-11 LAB
GLUCOSE BLDC GLUCOMTR-MCNC: 146 MG/DL (ref 70–99)
GLUCOSE BLDC GLUCOMTR-MCNC: 155 MG/DL (ref 70–99)
GLUCOSE BLDC GLUCOMTR-MCNC: 170 MG/DL (ref 70–99)
GLUCOSE BLDC GLUCOMTR-MCNC: 82 MG/DL (ref 70–99)

## 2022-02-11 PROCEDURE — 250N000011 HC RX IP 250 OP 636: Performed by: INTERNAL MEDICINE

## 2022-02-11 PROCEDURE — 250N000009 HC RX 250: Performed by: INTERNAL MEDICINE

## 2022-02-11 PROCEDURE — 250N000012 HC RX MED GY IP 250 OP 636 PS 637: Performed by: INTERNAL MEDICINE

## 2022-02-11 PROCEDURE — 94640 AIRWAY INHALATION TREATMENT: CPT | Mod: 76

## 2022-02-11 PROCEDURE — 999N000032 HC STATISTIC CHRONIC DISEASE SPECIALIST RT CONSULT

## 2022-02-11 PROCEDURE — 250N000013 HC RX MED GY IP 250 OP 250 PS 637: Performed by: INTERNAL MEDICINE

## 2022-02-11 PROCEDURE — 99207 PR CDG-MDM COMPONENT: MEETS MODERATE - DOWN CODED: CPT | Performed by: INTERNAL MEDICINE

## 2022-02-11 PROCEDURE — 120N000001 HC R&B MED SURG/OB

## 2022-02-11 PROCEDURE — G0463 HOSPITAL OUTPT CLINIC VISIT: HCPCS

## 2022-02-11 PROCEDURE — 94640 AIRWAY INHALATION TREATMENT: CPT

## 2022-02-11 PROCEDURE — 99232 SBSQ HOSP IP/OBS MODERATE 35: CPT | Performed by: INTERNAL MEDICINE

## 2022-02-11 PROCEDURE — 999N000157 HC STATISTIC RCP TIME EA 10 MIN

## 2022-02-11 RX ORDER — PREDNISONE 20 MG/1
40 TABLET ORAL DAILY
Qty: 6 TABLET | Refills: 0 | Status: SHIPPED | OUTPATIENT
Start: 2022-02-11 | End: 2022-02-14

## 2022-02-11 RX ORDER — PREDNISONE 20 MG/1
40 TABLET ORAL DAILY
Status: DISCONTINUED | OUTPATIENT
Start: 2022-02-12 | End: 2022-02-12 | Stop reason: HOSPADM

## 2022-02-11 RX ORDER — DIPHENHYDRAMINE HCL 25 MG
25 TABLET ORAL AT BEDTIME
Qty: 30 TABLET | Refills: 0 | Status: SHIPPED | OUTPATIENT
Start: 2022-02-11 | End: 2022-03-13

## 2022-02-11 RX ORDER — POLYETHYLENE GLYCOL 3350 17 G/17G
17 POWDER, FOR SOLUTION ORAL DAILY
Qty: 510 G | Refills: 0 | Status: SHIPPED | OUTPATIENT
Start: 2022-02-11 | End: 2023-07-04

## 2022-02-11 RX ORDER — POLYETHYLENE GLYCOL 3350 17 G/17G
17 POWDER, FOR SOLUTION ORAL DAILY
Status: DISCONTINUED | OUTPATIENT
Start: 2022-02-11 | End: 2022-02-12 | Stop reason: HOSPADM

## 2022-02-11 RX ORDER — BISACODYL 5 MG
10 TABLET, DELAYED RELEASE (ENTERIC COATED) ORAL ONCE
Status: COMPLETED | OUTPATIENT
Start: 2022-02-11 | End: 2022-02-11

## 2022-02-11 RX ADMIN — ATORVASTATIN CALCIUM 40 MG: 40 TABLET, FILM COATED ORAL at 08:14

## 2022-02-11 RX ADMIN — ENOXAPARIN SODIUM 40 MG: 40 INJECTION SUBCUTANEOUS at 10:49

## 2022-02-11 RX ADMIN — BISACODYL 10 MG: 5 TABLET ORAL at 10:49

## 2022-02-11 RX ADMIN — ASPIRIN 81 MG: 81 TABLET, COATED ORAL at 08:14

## 2022-02-11 RX ADMIN — DIPHENHYDRAMINE HCL 25 MG: 25 TABLET ORAL at 20:36

## 2022-02-11 RX ADMIN — AMLODIPINE BESYLATE 5 MG: 5 TABLET ORAL at 08:14

## 2022-02-11 RX ADMIN — ENOXAPARIN SODIUM 40 MG: 40 INJECTION SUBCUTANEOUS at 20:35

## 2022-02-11 RX ADMIN — ALBUTEROL SULFATE 2 PUFF: 90 AEROSOL, METERED RESPIRATORY (INHALATION) at 10:48

## 2022-02-11 RX ADMIN — IPRATROPIUM BROMIDE AND ALBUTEROL SULFATE 3 ML: 2.5; .5 SOLUTION RESPIRATORY (INHALATION) at 14:34

## 2022-02-11 RX ADMIN — METHYLPREDNISOLONE SODIUM SUCCINATE 40 MG: 40 INJECTION, POWDER, FOR SOLUTION INTRAMUSCULAR; INTRAVENOUS at 08:14

## 2022-02-11 RX ADMIN — INSULIN ASPART 1 UNITS: 100 INJECTION, SOLUTION INTRAVENOUS; SUBCUTANEOUS at 12:08

## 2022-02-11 RX ADMIN — POLYETHYLENE GLYCOL 3350 17 G: 17 POWDER, FOR SOLUTION ORAL at 10:49

## 2022-02-11 RX ADMIN — DOXYCYCLINE 100 MG: 100 CAPSULE ORAL at 20:36

## 2022-02-11 RX ADMIN — INSULIN ASPART 1 UNITS: 100 INJECTION, SOLUTION INTRAVENOUS; SUBCUTANEOUS at 16:19

## 2022-02-11 RX ADMIN — CEFTRIAXONE SODIUM 1 G: 1 INJECTION, POWDER, FOR SOLUTION INTRAMUSCULAR; INTRAVENOUS at 15:56

## 2022-02-11 RX ADMIN — DOXYCYCLINE 100 MG: 100 CAPSULE ORAL at 08:14

## 2022-02-11 RX ADMIN — IPRATROPIUM BROMIDE AND ALBUTEROL SULFATE 3 ML: 2.5; .5 SOLUTION RESPIRATORY (INHALATION) at 08:25

## 2022-02-11 ASSESSMENT — ACTIVITIES OF DAILY LIVING (ADL)
ADLS_ACUITY_SCORE: 5

## 2022-02-11 NOTE — PROGRESS NOTES
[facility]    Medicine Progress Note - Hospitalist Service    Date of Admission:  2/9/2022    Rhett Alvarado is a 76 year old female with BMH of diffuse large B-cell type Non-Hodgkin lymphoma (dx in 2011, in remission s/p chemotherapy), COPD, tuberculosis treated with antituberculosis chemotherapy, and HTN, admitted on 2/9/2022 with:    COPD with acute exacerbation.  Extensive wheezes on admission.  Upper respiratory infection/acute bronchitis. Normal lactate 1.7, WBCs 15.0.  No known history of CHF.  BNP normal at 55.  BC from 12/9/2022-NGTD.  Symptoms improving with Solu-Medrol, nebulizers, doxycycline/ceftriaxone.  2/11 Solu-Medrol changed to prednisone.    E. coli UTI.  Positive BC on 2/9/2022.  On ceftriaxone as above.    Diffuse large B cell non-Hodgkin's lymphoma, in remission.    History of pulmonary tuberculosis, completed anti-TB chemotherapy.    Essential hypertension-on amlodipine.  HLD-on Lipitor.    Stable ascending aortic 4.2 cm aneurysm.    Prediabetes. A1c 6.2.  Diet changed to diabetic.  RD consult for occasional diabetic diet.    Probable UTI. UA positive for leukoesterase, negative nitrates, pyuria at 17.  -On ceftriaxone. Follow-up UC.    Keloid scars of bilateral breasts, with intermittent pruritus.  -Bedtime Benadryl.  -Recall treatment per home regimen.    Constipation, likely slow transit.  Also insufficient fluid intake.  Encourage p.o. fluids.  Added bowel regimen.       Diet: Regular Diet Adult    DVT Prophylaxis: Enoxaparin (Lovenox) SQ  Hugo Catheter: Not present  Central Lines: None  Cardiac Monitoring: None  Code Status: Full Code Full    Disposition Plan   Expected Discharge: Home on 2/12/2022     The patient's care was discussed with the Bedside Nurse, Care Coordinator/ and Patient.    Cari Ramirez MD  Hospitalist Service  [facility]  Securely message with the Vocera Web Console (learn more here)  Text page via [employer]  "Paging/Directory     Clinically Significant Risk Factors Present on Admission               # Platelet Defect: home medication list includes an antiplatelet medication   # Severe Obesity: Estimated body mass index is 40.5 kg/m  as calculated from the following:    Height as of this encounter: 1.346 m (4' 5\").    Weight as of this encounter: 73.4 kg (161 lb 12.8 oz).    _____________________________________________________________________    Interval History   Uneventful night.  Dyspnea improved.  Less expiratory wheezes, versus mostly over the left lung.  Complaining of dysuria and constipation.  No bowel movement for 3 days.  No abdominal pain, chest pain, nausea, vomiting, dysuria.    Data reviewed today: I reviewed all medications, new labs and imaging results over the last 24 hours. I personally reviewed    Physical Exam   Vital Signs: Temp: 98.3  F (36.8  C) Temp src: Oral BP: 136/71 Pulse: 88   Resp: 22 SpO2: 96 % O2 Device: None (Room air)    Weight: 161 lbs 12.8 oz  General: Alert and oriented x 3. Not in obvious distress.  HEENT: NC, AT. Neck- supple, No JVP elevation, lymphadenopathy or thyromegaly. Trachea-central.  Chest: Good air movement bilaterally, prolonged expiratory phase, scattered expiratory wheezes. No aspects of bilateral breast with keloid scars, two small keloid scars in the back.  Heart: S1S2 regular. No M/R/G.  Abdomen: Soft. NT, ND. No organomegaly. Bowel sounds- active.  Back: No spine tenderness. No CVA tenderness.  Extremities: No leg swelling. Peripheral pulses 2+ bilaterally.  Neuro: Cranial nerves 1-12 grossly normal. No focal neurological deficit    Data   Recent Labs   Lab 02/11/22  1205 02/11/22  0758 02/10/22  2105 02/10/22  1731 02/09/22  1402   WBC  --   --   --   --  15.0*   HGB  --   --   --   --  12.3   MCV  --   --   --   --  86   PLT  --   --   --   --  266   NA  --   --   --   --  136   POTASSIUM  --   --   --   --  3.7   CHLORIDE  --   --   --   --  104   CO2  --   --   " --   --  20*   BUN  --   --   --   --  12   CR  --   --   --   --  0.96   ANIONGAP  --   --   --   --  12   MARY  --   --   --   --  8.6   * 82 154*   < > 127*    < > = values in this interval not displayed.     No results found for this or any previous visit (from the past 24 hour(s)).

## 2022-02-11 NOTE — PLAN OF CARE
"   PRIMARY DIAGNOSIS: \"GENERIC\" NURSING  OUTPATIENT/OBSERVATION GOALS TO BE MET BEFORE DISCHARGE:  1. ADLs back to baseline: Yes    2. Activity and level of assistance: Ambulating independently.    3. Pain status: Pain free.    4. Return to near baseline physical activity: Yes     Discharge Planner Nurse   Safe discharge environment identified: Yes  Barriers to discharge: No, SOB.        Entered by: Rosita Bond 02/10/2022 9:54 PM         "

## 2022-02-11 NOTE — CONSULTS
COPD Initial Interview, Education, and Consult  2/11/2022, 11:00 AM    Reason for Consult: COPD education  Patient Admitted for: COPD exacerbation (H) [J44.1] on 2/9/2022     History of Present Illness: Rhett is a 76 year female with a history of hypertension, aortic aneurysm, history of TB, lung mass, pulmonary hypertension,   B-cell non-Hodgkin lymphoma, never a smoker, and COPD GOLD stage 2.    Last PFT:  Date: 6/5/2014  Post-Spirometry:  FVC: 2.08, 98%  FEV1: 1.01, 63%  FEV1/FVC: 48%    Home Respiratory Medications:  Bronchodilators:   -albuterol inhaler 2 puffs PRN   -albuterol neb PRN    Assessment: Patient currently is sitting comfortably in the chair on room air, able to speak in full sentences. She speaks some English, states her daughter will be here in the afternoon.  Plan to stop in afternoon to talk with her daughter.     1509: spoke with Daughter, reviewed education information with her and the folder.  Also talked with patients son whom she used live with via phone.  Daughter had a question on if we knew what she had for insurance.  We have Medicare listed in the chart but no supplemental.  Let her know that she will have to check with her brother if there is one.    Education done during visit:  -Medication use and instruction done for albuterol inhaler.  Patient is able to demonstrate use with a MDI spacer and achieve and adequate inhalation flow of 50 on the in-check device at free flow,  MDI spacer also was issued for use with their albuterol inhaler.  Let her daughter know where the instructions are and that her mother was instructed and able to use it.  -COPD Action Plan, discussed and copies made, Reviewed with Daughter  -Information on COPD and tools to help cope Reviewed with Daughter also  -Breathing techniques including pursed-lip breathing  -Disease process and irritants; discussed, questions answered for daughter and patient , also talked with her son via phone who she used to live with  until recently  -Issued patient a folder with COPD information    Recommendations:  -Continue current inpatient therapy, per [facility] protocols  -Follow up appointment with pulmonology along with an updated Spirometry testing  Would be advised to make sure her lungs have not progressed..  -Medications patient is currently taking at home appear to be working well, recommend no change unless she has another excerbation in which case would add on a low flow LABA/LAMA like Anoro or Stiolto.    Will continue to follow patient throughout hospital stay along with educating patient and family.    Total time spend with patient and family 24 minutes and 35 minutes spent in care coordination and documentation.    Roxi Nayak, RT, Chronic Pulmonary Disease Specialist  Phone [phone]

## 2022-02-11 NOTE — PROGRESS NOTES
[facility]:    Acuity: 4    Score: 7    Patient is currently on room air and is mid 90s. BS clear. Dyspnea/desaturation noted and there is no productive cough. RT will re-evaluate in 72 hrs.     Paul Perry, RT

## 2022-02-11 NOTE — PROGRESS NOTES
"PT is currently on room air with an SpO2 of 94%.  Breathing pattern regular Breath sounds insp and exp wheezes Cough type infrequent, congested   PRN Duoneb nebulizer given x2  PT tolerated treatments well.  RT will continue to follow.      /60 (BP Location: Right arm)   Pulse 106   Temp 99.1  F (37.3  C) (Oral)   Resp 24   Ht 1.346 m (4' 5\")   Wt 73.4 kg (161 lb 12.8 oz)   SpO2 94%   BMI 40.50 kg/m      Adis Man, RT  2/11/2022      "

## 2022-02-11 NOTE — SIGNIFICANT EVENT
Sepsis Evaluation Progress Note    I was called to see Rhett Alvarado due to elevated lactic acid of 3.0. She is here with a COPD exacerbation on duo-nebs Q6 and albuterol PRN as well as doxy/ceftriaxone.     Physical Exam   Vital Signs:  Temp: 97.7  F (36.5  C) Temp src: Oral BP: 120/67 Pulse: 100   Resp: 20 SpO2: 94 % O2 Device: None (Room air)       General: in no acute distress  Mental Status: AAOx4.     Remainder of physical exam is significant for generalized scattered wheezing throughout. Good air movement throughout.     Data   Lactic Acid   Date Value Ref Range Status   02/10/2022 3.0 (H) 0.7 - 2.0 mmol/L Final   02/09/2022 1.7 0.7 - 2.0 mmol/L Final       Assessment & Plan   NO EVIDENCE OF SEPSIS at this time.  Vital sign, physical exam, and lab findings are due to scheduled albuterol neb and use of PRN albuterol inhaler today. Patient is feeling much improved since admission.    Disposition: The patient will remain on the current unit. We will continue to monitor this patient closely..  Gloira Gilliam MD

## 2022-02-11 NOTE — PLAN OF CARE
"PRIMARY DIAGNOSIS: \"GENERIC\" NURSING  OUTPATIENT/OBSERVATION GOALS TO BE MET BEFORE DISCHARGE:  ADLs back to baseline: Yes    Activity and level of assistance: Ambulating independently.    Pain status: Pain free.    Return to near baseline physical activity: Yes     Discharge Planner Nurse   Safe discharge environment identified: Yes  Barriers to discharge: Yes, improve breathing       Entered by: Rosita Bond 02/10/2022 9:52 PM    "

## 2022-02-11 NOTE — PLAN OF CARE
Problem: Risk for Delirium  Goal: Optimal Coping  Outcome: Improving  Goal: Improved Sleep  Outcome: Improving       Pt a/o; pleasant. LSC. BS active. However pt is c/o constipation. Warm pack applied and prune juice given. Voiding without difficulty.

## 2022-02-11 NOTE — PLAN OF CARE
Problem: Adult Inpatient Plan of Care  Goal: Absence of Hospital-Acquired Illness or Injury  Intervention: Identify and Manage Fall Risk  Recent Flowsheet Documentation  Taken 2/11/2022 1129 by Duarte Garza RN  Safety Promotion/Fall Prevention:   activity supervised   safety round/check completed   nonskid shoes/slippers when out of bed  Intervention: Prevent Skin Injury  Recent Flowsheet Documentation  Taken 2/11/2022 0816 by Duarte Garza RN  Body Position: position changed independently   Up and ambulating in room independent.  Problem: Risk for Delirium  Goal: Optimal Coping  Outcome: No Change   Alert and oriented x4. Able to express needs  Problem: Gas Exchange Impaired  Goal: Optimal Gas Exchange  Outcome: No Change   Expiratory wheezes. Dyspnea with exertion . Albuterol inhaler is given x1  by RN and neb treatments x 2 per RT

## 2022-02-12 VITALS
DIASTOLIC BLOOD PRESSURE: 73 MMHG | TEMPERATURE: 98.1 F | BODY MASS INDEX: 37.44 KG/M2 | HEIGHT: 55 IN | WEIGHT: 161.8 LBS | OXYGEN SATURATION: 98 % | HEART RATE: 80 BPM | RESPIRATION RATE: 24 BRPM | SYSTOLIC BLOOD PRESSURE: 121 MMHG

## 2022-02-12 LAB
BACTERIA UR CULT: ABNORMAL
GLUCOSE BLDC GLUCOMTR-MCNC: 116 MG/DL (ref 70–99)

## 2022-02-12 PROCEDURE — 250N000011 HC RX IP 250 OP 636: Performed by: INTERNAL MEDICINE

## 2022-02-12 PROCEDURE — 250N000013 HC RX MED GY IP 250 OP 250 PS 637: Performed by: INTERNAL MEDICINE

## 2022-02-12 PROCEDURE — 250N000012 HC RX MED GY IP 250 OP 636 PS 637: Performed by: INTERNAL MEDICINE

## 2022-02-12 PROCEDURE — 250N000009 HC RX 250: Performed by: INTERNAL MEDICINE

## 2022-02-12 RX ORDER — CEFDINIR 300 MG/1
300 CAPSULE ORAL EVERY 12 HOURS SCHEDULED
Status: DISCONTINUED | OUTPATIENT
Start: 2022-02-12 | End: 2022-02-12 | Stop reason: HOSPADM

## 2022-02-12 RX ORDER — GUAIFENESIN 600 MG/1
1200 TABLET, EXTENDED RELEASE ORAL 2 TIMES DAILY
Qty: 40 TABLET | Refills: 0 | Status: SHIPPED | OUTPATIENT
Start: 2022-02-12 | End: 2022-02-22

## 2022-02-12 RX ORDER — CEFDINIR 300 MG/1
300 CAPSULE ORAL 2 TIMES DAILY
Qty: 14 CAPSULE | Refills: 0 | Status: SHIPPED | OUTPATIENT
Start: 2022-02-12 | End: 2022-02-19

## 2022-02-12 RX ADMIN — ATORVASTATIN CALCIUM 40 MG: 40 TABLET, FILM COATED ORAL at 09:24

## 2022-02-12 RX ADMIN — ASPIRIN 81 MG: 81 TABLET, COATED ORAL at 09:24

## 2022-02-12 RX ADMIN — CEFDINIR 300 MG: 300 CAPSULE ORAL at 09:27

## 2022-02-12 RX ADMIN — DOXYCYCLINE 100 MG: 100 CAPSULE ORAL at 09:24

## 2022-02-12 RX ADMIN — ALBUTEROL SULFATE 2.5 MG: 2.5 SOLUTION RESPIRATORY (INHALATION) at 05:58

## 2022-02-12 RX ADMIN — PREDNISONE 40 MG: 20 TABLET ORAL at 09:24

## 2022-02-12 RX ADMIN — ENOXAPARIN SODIUM 40 MG: 40 INJECTION SUBCUTANEOUS at 09:25

## 2022-02-12 RX ADMIN — POLYETHYLENE GLYCOL 3350 17 G: 17 POWDER, FOR SOLUTION ORAL at 09:24

## 2022-02-12 RX ADMIN — AMLODIPINE BESYLATE 5 MG: 5 TABLET ORAL at 09:24

## 2022-02-12 ASSESSMENT — ACTIVITIES OF DAILY LIVING (ADL)
ADLS_ACUITY_SCORE: 5

## 2022-02-12 NOTE — PROGRESS NOTES
Care Management Discharge Note    Discharge Date: 02/12/2022  Expected Time of Departure: 1100    Discharge Disposition: Home    Discharge Services: None    Discharge DME: None    Discharge Transportation: agency    Private pay costs discussed: Not applicable    Education Provided on the Discharge Plan:  Per team  Persons Notified of Discharge Plans: Yes, andrea Stahl, P: [phone]  Patient/Family in Agreement with the Plan:  Yes    Handoff Referral Completed: Yes    Additional Information:  SW reviewed chart and spoke with interdisciplinary team. Per team, pt is medically cleared for discharge to home with no new care management discharge needs.     Pt plans to return home where she lives with her daughter. SW spoke with daughter Favio who notes she will have a car available to provide transportation home between 1030/1100; she plans to come to pt's bedside. Favio declined any other questions at this time.    Discharge questions and needs addressed. Please notify [facility] if any needs arise.     Luigi DAVIES LGSW

## 2022-02-12 NOTE — DISCHARGE SUMMARY
[facility]  Hospitalist Discharge Summary      Date of Admission:  2/9/2022  Date of Discharge:  2/12/2022  Discharging Provider: Cari Ramirez MD  Discharge Service: Hospitalist Service    Discharge Diagnoses   COPD with acute exacerbation  E. coli UTI  Diffuse large B cell non-Hodgkin's lymphoma, in remission  Essential hypertension  Stable ascending aortic 4.2 cm aneurysm.  Prediabetes  Keloid scars of bilateral breasts, with intermittent pruritus    Follow-ups Needed After Discharge   Follow-up Appointments    Follow-up and recommended labs and tests      Follow up with primary care provider, Physician No Ref-Primary, within 7   days to evaluate medication change and for hospital follow- up.  No follow   up labs or test are needed.    Follow-up with pulmonology, on COPD exacerbation and to schedule PFT.        Unresulted Labs Ordered in the Past 30 Days of this Admission     Date and Time Order Name Status Description    2/9/2022  1:41 PM Blood Culture Peripheral Blood Preliminary     2/9/2022  1:41 PM Blood Culture Peripheral Blood Preliminary       These results will be followed up by me    Discharge Disposition   Discharged to home  Condition at discharge: Stable    Hospital Course      Rhett Alvarado is a 76 year old female with PMH of diffuse large B-cell type Non-Hodgkin lymphoma (dx in 2011, in remission s/p chemotherapy), COPD, tuberculosis treated with antituberculosis chemotherapy, and HTN, admitted on 2/9/2022 with:     COPD with acute exacerbation, without respiratory failure or insufficiency.  Extensive wheezes on admission.  Upper respiratory infection/acute bronchitis. Normal lactate 1.7, WBCs 15.0.  CHF as probable contributor to dyspnea was ruled out: No known history of CHF.  BNP normal at 55.  BC from 12/9/2022-TD.  Symptoms improved with Solu-Medrol, nebulizers, doxycycline/ceftriaxone.  2/11 Solu-Medrol transitioned to prednisone.  At discharge ceftriaxone transition  to Omnicef.     E. coli UTI.  Positive urine on 2/9/2022, negative blood culture.  On ceftriaxone per sensitivities, Omnicef at discharge     Diffuse large B cell non-Hodgkin's lymphoma, in remission.     History of pulmonary tuberculosis, completed anti-TB chemotherapy.     Essential hypertension-stable on amlodipine.  HLD-on Lipitor.  Stable ascending aortic 4.2 cm aneurysm.  Prediabetes. A1c 6.2.  She was counseled on lifestyle modifications.RD consult for occasional diabetic diet.     Keloid scars of bilateral breasts, with intermittent pruritus.  Pruritus elevated with prn Benadryl.     Constipation, likely slow transit.  Also insufficient fluid intake PTA.  Treated with bowel regimen.     Consultations This Hospital Stay   IP RESPIRATORY CARE CHRONIC PULMONARY DISEASE SPECIALIST  SOCIAL WORK IP CONSULT  NUTRITION SERVICES ADULT IP CONSULT  IP RESPIRATORY CARE CHRONIC PULMONARY DISEASE SPECIALIST    Code Status   Full Code    Time Spent on this Encounter   I, Cari Ramirez MD, personally saw the patient today and spent greater than 30 minutes discharging this patient.       Cari Ramirez MD  [address] [address]  Phone: [phone]  Fax: [phone]  ______________________________________________________________________    Physical Exam   Vital Signs: Temp: 98.1  F (36.7  C) Temp src: Oral BP: 121/73 Pulse: 80   Resp: 24 SpO2: 98 % O2 Device: None (Room air)    Weight: 161 lbs 12.8 oz  General: Alert and oriented x 3. Not in obvious distress.  HEENT: NC, AT. Neck- supple, No JVP elevation, lymphadenopathy or thyromegaly. Trachea-central.  Chest: Good air movement bilaterally, prolonged expiratory phase, scattered expiratory wheezes. No aspects of bilateral breast with keloid scars, two small keloid scars in the back.  Heart: S1S2 regular. No M/R/G.  Abdomen: Soft. NT, ND. No organomegaly. Bowel sounds- active.  Back: No spine tenderness. No  CVA tenderness.  Extremities: No leg swelling. Peripheral pulses 2+ bilaterally.  Neuro: Cranial nerves 1-12 grossly normal. No focal neurological deficit       Primary Care Physician   Physician No Ref-Primary    Discharge Orders      Reason for your hospital stay    COPD exacerbation, UTI     Activity    Your activity upon discharge: activity as tolerated.     Follow-up and recommended labs and tests     Follow up with primary care provider, Physician No Ref-Primary, within 7 days to evaluate medication change and for hospital follow- up.  No follow up labs or test are needed.    Follow-up with pulmonology, on COPD exacerbation and to schedule PFT.     Diet    Follow this diet upon discharge: Diabetic diet     Significant Results and Procedures   Results for orders placed or performed during the hospital encounter of 02/09/22   CT Chest Pulmonary Embolism w Contrast    Narrative    EXAM: CT CHEST PULMONARY EMBOLISM W CONTRAST  LOCATION: [facility]  DATE/TIME: 2/9/2022 2:59 PM    INDICATION: Dyspnea. Tachycardia. Hypoxia.  COMPARISON: CT 08/05/2014 and 01/28/2013  TECHNIQUE: CT chest pulmonary angiogram during arterial phase injection of IV contrast. Multiplanar reformats and MIP reconstructions were performed. Dose reduction techniques were used.   CONTRAST: IsoVue 370 100mL    FINDINGS:  ANGIOGRAM CHEST: No evidence pulmonary embolism. Main pulmonary artery is 3.5 cm. This suggests pulmonary arterial hypertension. No aortic dissection. Stable ascending aortic 4.2 cm aneurysm. No CT evidence of right heart strain.    LUNGS AND PLEURA: Chronic left upper lobe atelectasis and scar. Chronic right middle lobe atelectasis and scar. The left upper lobe and middle lobe bronchi are obstructed by heterogeneous perihilar soft tissue which extends into the mediastinum. This was   present in 2013, and presumably due to chronic mediastinitis.    Soft tissue thickening surrounds the right and left lower  lobe bronchi, similar to previous. No effusion.    MEDIASTINUM/AXILLAE: Several small aortopulmonary window lymph nodes. Stable right thyroid 2.5 cm nodule.    CORONARY ARTERY CALCIFICATION: None.    UPPER ABDOMEN: Normal.    MUSCULOSKELETAL: Lower thoracic vertebral body hemangioma.      Impression    IMPRESSION:  1.  No evidence pulmonary embolism.  2.  Pulmonary arterial hypertension.  3.  Stable ascending aortic 4.2 cm aneurysm.  4.  Presumed chronic mediastinitis causing right middle lobe, and left upper lobe chronic atelectasis and scar.  5.  No acute pulmonary abnormality seen.     Discharge Medications   Current Discharge Medication List      START taking these medications    Details   cefdinir (OMNICEF) 300 MG capsule Take 1 capsule (300 mg) by mouth 2 times daily for 7 days  Qty: 14 capsule, Refills: 0    Associated Diagnoses: COPD exacerbation (H); E. coli UTI      diphenhydrAMINE (BENADRYL) 25 MG tablet Take 1 tablet (25 mg) by mouth At Bedtime  Qty: 30 tablet, Refills: 0    Associated Diagnoses: Primary insomnia      guaiFENesin (MUCINEX) 600 MG 12 hr tablet Take 2 tablets (1,200 mg) by mouth 2 times daily for 10 days  Qty: 40 tablet, Refills: 0    Associated Diagnoses: COPD exacerbation (H)      polyethylene glycol (MIRALAX) 17 GM/Dose powder Take 17 g by mouth daily  Qty: 510 g, Refills: 0    Associated Diagnoses: Slow transit constipation      predniSONE (DELTASONE) 20 MG tablet Take 2 tablets (40 mg) by mouth daily for 3 days  Qty: 6 tablet, Refills: 0    Associated Diagnoses: COPD exacerbation (H)         CONTINUE these medications which have NOT CHANGED    Details   acetaminophen (TYLENOL) 325 MG tablet [ACETAMINOPHEN (TYLENOL) 325 MG TABLET] Take 650 mg by mouth every 6 (six) hours as needed for pain.      albuterol (PROVENTIL HFA;VENTOLIN HFA) 90 mcg/actuation inhaler [ALBUTEROL (PROVENTIL HFA;VENTOLIN HFA) 90 MCG/ACTUATION INHALER] Inhale 2 puffs every 6 (six) hours as needed for wheezing.       amLODIPine (NORVASC) 5 MG tablet [AMLODIPINE (NORVASC) 5 MG TABLET] Take 5 mg by mouth daily.      aspirin 81 MG EC tablet Take 81 mg by mouth daily      atorvastatin (LIPITOR) 40 MG tablet Take 40 mg by mouth daily           Allergies   No Known Allergies

## 2022-02-12 NOTE — PLAN OF CARE
Problem: Gas Exchange Impaired  Goal: Optimal Gas Exchange  Outcome: Improving       A/O, very pleasant. Slept well until 0530 and woke up with severe wheezing. LS had tight ins/exp wheezes throughout and severe in upper airway. Albuterol neb given with some relief. Congested, Productive cough-clear phlegm. BS active. Voiding without difficulty.

## 2022-02-12 NOTE — PLAN OF CARE
"  Problem: Adult Inpatient Plan of Care  Goal: Absence of Hospital-Acquired Illness or Injury  Outcome: Adequate for Discharge  Intervention: Identify and Manage Fall Risk  Recent Flowsheet Documentation  Taken 2/12/2022 0900 by Yenny Lopez, RN  Safety Promotion/Fall Prevention:   fall prevention program maintained   clutter free environment maintained   nonskid shoes/slippers when out of bed  Intervention: Prevent Skin Injury  Recent Flowsheet Documentation  Taken 2/12/2022 0900 by Yenny Lopez RN  Body Position: position changed independently     Problem: Hyperglycemia  Goal: Blood Glucose Level Within Targeted Range  Outcome: Adequate for Discharge     Blood sugar this morning was 116    Problem: Hypertension Acute  Goal: Blood Pressure Within Desired Range  Outcome: Adequate for Discharge     /73 (BP Location: Right arm, Patient Position: Sitting)   Pulse 80   Temp 98.1  F (36.7  C) (Oral)   Resp 24   Ht 1.346 m (4' 5\")   Wt 73.4 kg (161 lb 12.8 oz)   SpO2 98%   BMI 40.50 kg/m       Problem: Gas Exchange Impaired  Goal: Optimal Gas Exchange  Outcome: Adequate for Discharge    Pt tolerating room air. Inspiratory and expiratory wheezes noted. Infrequent cough.    All discharge medications and paperwork sent with pt and daughter  "

## 2022-02-12 NOTE — PLAN OF CARE
Problem: Adult Inpatient Plan of Care  Goal: Absence of Hospital-Acquired Illness or Injury  Intervention: Identify and Manage Fall Risk  Recent Flowsheet Documentation  Taken 2/11/2022 2100 by Rosita Bond RN  Safety Promotion/Fall Prevention:    nonskid shoes/slippers when out of bed    patient and family education    room door open     Problem: Gas Exchange Impaired  Goal: Optimal Gas Exchange  Outcome: Improving   Lungs are diminished. Improved breathing compared to yesterday. Lungs are diminished. Tolerating room air.

## 2022-02-12 NOTE — CONSULTS
NUTRITION EDUCATION      REASON FOR ASSESSMENT:  To educate on consistent CHO    CURRENT DIET:  Consistent CHO (60 g CHO per meal)    NUTRITION DIAGNOSIS:  Food- and nutrition-related knowledge deficit R/t Pre diabetes as evidenced by consult for diet education    INTERVENTIONS:    Nutrition Prescription:  Consistent CHO (3-4 CHO choices with each meal)    Implementation:      *  Nutrition Education (Content):   A)  Provided handout placemat and CHO counting for people with diabtes   B)  Discussed CHO containing foods and portion sizes. Did comment on label reading but  Stated he didn't think pt would understand that. Sample meal discussed for 4 CHO choices.      *  Nutrition Education (Application):   A)  Discussed current eating habits and recommended alternative food choices      *  Anticipate good compliance      *  Diet Education - refer to Education Flowsheet    Goals:      *  Patient will verbalize understanding of diet-met.  Pt asked questions during education      *  All of the above goals met during the education session    Follow Up/Monitoring:      *  Provided RD contact information for future questions      *  Recommended Out-Patient Nutrition Referral, if further diet instructions are needed

## 2022-02-14 ENCOUNTER — OFFICE VISIT (OUTPATIENT)
Dept: FAMILY MEDICINE | Facility: CLINIC | Age: 77
End: 2022-02-14
Payer: MEDICARE

## 2022-02-14 VITALS
OXYGEN SATURATION: 98 % | BODY MASS INDEX: 30.44 KG/M2 | RESPIRATION RATE: 28 BRPM | TEMPERATURE: 97.6 F | DIASTOLIC BLOOD PRESSURE: 85 MMHG | HEART RATE: 75 BPM | HEIGHT: 58 IN | SYSTOLIC BLOOD PRESSURE: 143 MMHG | WEIGHT: 145 LBS

## 2022-02-14 DIAGNOSIS — L29.9 ITCHING: ICD-10-CM

## 2022-02-14 DIAGNOSIS — N81.4 UTERINE PROLAPSE: ICD-10-CM

## 2022-02-14 DIAGNOSIS — J44.9 COPD (CHRONIC OBSTRUCTIVE PULMONARY DISEASE) (H): Primary | ICD-10-CM

## 2022-02-14 DIAGNOSIS — J44.1 CHRONIC OBSTRUCTIVE PULMONARY DISEASE WITH ACUTE EXACERBATION (H): Primary | ICD-10-CM

## 2022-02-14 DIAGNOSIS — H54.7 WORSENING VISION: ICD-10-CM

## 2022-02-14 LAB
BACTERIA BLD CULT: NO GROWTH
BACTERIA BLD CULT: NO GROWTH

## 2022-02-14 PROCEDURE — 99214 OFFICE O/P EST MOD 30 MIN: CPT | Mod: GC | Performed by: STUDENT IN AN ORGANIZED HEALTH CARE EDUCATION/TRAINING PROGRAM

## 2022-02-14 RX ORDER — DIAPER,BRIEF,INFANT-TODD,DISP
EACH MISCELLANEOUS 2 TIMES DAILY PRN
Qty: 30 G | Refills: 1 | Status: SHIPPED | OUTPATIENT
Start: 2022-02-14 | End: 2022-05-04

## 2022-02-14 RX ORDER — ALBUTEROL SULFATE 90 UG/1
2 AEROSOL, METERED RESPIRATORY (INHALATION) EVERY 6 HOURS PRN
Qty: 18 G | Refills: 1 | Status: SHIPPED | OUTPATIENT
Start: 2022-02-14 | End: 2022-06-02

## 2022-02-14 ASSESSMENT — MIFFLIN-ST. JEOR: SCORE: 1043.6

## 2022-02-14 ASSESSMENT — PATIENT HEALTH QUESTIONNAIRE - PHQ9: SUM OF ALL RESPONSES TO PHQ QUESTIONS 1-9: 16

## 2022-02-14 NOTE — PATIENT INSTRUCTIONS
-Steroid cream for keloid itching  -Pulmonology, OB/gyn, eye referral sent  -Start spiriva 2 puffs daily  -Continue prednisone course until complete  -Continue antibiotic until complete  -Continue using albuterol as needed    Referral for :     OB GYN    LOCATION/PLACE/Provider :    [facility]   DATE & TIME :    Faxed, they will call patient   PHONE :     [phone]  FAX :    [phone]  Appointment made by clinic staff/:    Megan

## 2022-02-14 NOTE — PROGRESS NOTES
Assessment and Plan     (J44.1) Chronic obstructive pulmonary disease with acute exacerbation (H)  (primary encounter diagnosis)  Comment: Rhett is a 77 y/o female with h/o of COPD and recent hospitalization, 2/9/22, for COPD exacerbation. She was admitted to the hospital for 2 days and given 3 day course of prednisone and albuterol inhaler. Patient dyspnea is improved and closer to baseline. No respiratory distress or fever at this visit. Had a pneumonia ~2 years back, but no other episodes of COPD exacerbation mentioned today. At home, takes albuterol inhaler as needed. Also mentioned neb treatment, but unclear what type of treatment. Currently not on O2 at home. Given recent hospitalization and wheezing on exam, I believe she would benefit from COPD maintenance therapy at home. No recent follow up medical records as patient recently moved back to [place], from [place], [place].    Plan: tiotropium (SPIRIVA RESPIMAT) 2.5 MCG/ACT         inhaler, Adult Pulmonary Medicine Referral,         albuterol (PROAIR HFA/PROVENTIL HFA/VENTOLIN         HFA) 108 (90 Base) MCG/ACT inhaler  - Maintaince therapy at home for COPD with LAMA and Albuterol  - Discussed using Spiriva inhaler daily, 2 puffs in the morning, 30 sec to 1 min between each puff   - Discussed using Albuterol inhaler every 6 hours as needed, especially during the next few days given recent COPD exacerbation  - Continue prednisone as prescribed  - Pulmonary Medicine referral    (L29.9) Itching  Comment: History of Keloid, which is also noted on exam.  Plan: hydrocortisone (CORTAID) 1 % external ointment       (H54.7) Worsening vision  Comment: Blind in R eye. Severe decreased in vision on L eye. Exam remarkable for corneal cloudiness.  Plan: Adult Eye Referral    (N81.4) Uterine prolapse  Comment: Patient was found to have UTI at her recent hospitalization, 2/9/22 for dyspnea. She was given a 7-day course of Cefdinir. UTI slightly improved, but continues to have  dysuria and frequency. Daughter is concerned about the uterus. She describes it as floppy and coming down. Likely uterine prolapse and may be disposing patient to UTI.  Plan:   - Continue prescribed course of Cefdinir for UTI.  - Ob/Gyn Referral    Options for treatment and follow-up care were reviewed with the patient and/or guardian. Rhett Alvarado and/or guardian engaged in the decision making process and verbalized understanding of the options discussed and agreed with the final plan.    Andrews Baig, MS3  Family Medicine  12:51 PM 02/14/22     Precepted today with: Octavia Smith MD           HPI:       Rhett Alvarado is a 76 year old  female with a significant past medical history of diffuse large B-cell type Non-Hodgkin lymphoma (dx 2011, in remission s/p chemo), COPD, TB s/p antituberculosis chemo, and HTN, for presents for follow up after recent admission, 2/9/2022 5 days ago, for COPD exacerbation and UTI.    1. Chronic dyspnea  - H/O of COPD, history of second hand smoking exposure,   - H/O B-cell Non-Hodgkin lymphoma, received chem, but no radiation  - Had a COPD exacerbation 5 days ago, 2/9/22, and was admitted for 2 days  - Mentions dx of pneumonia ~2 years ago, but no COPD exacerbation until recent admission  - Dyspnea improving since hospital admission, closer to baseline  - Used to follow up with a pulmonologist at [facility] back in [phone]  - Uses albuterol, as needed, 2-3 times daily  - On neb treatment as needed, 1-3 times daily, unclear which   - No O2 use at home  - COVID vaccination complete, states flu vaccine prior to her COVID booster  - On prednisone (3 day course), 1st dose yesterday, 2/13/22  - Would like a referral to pulmonology at [facility]    2. UTI  - Started Cefdinir (7 - day course), 1st dose on 2/13/22  - Noticed improvement since recent admission, but continues to have burning with urination, urgency with little urin, and frequency    3. Pelvic concerns  - Daughter states uterus is  coming down  - Would like an OB/GYN referral  - Worries about uterus predisposing her for UTIs     4. Vision concerns  - Unable to see with R eye  - Blurry vision with L eye    5. Keloid  - Itching and irritation of abdominal keloid    Patient's daughter present  A Moe Delo  was used for this visit         PMHX:     Patient Active Problem List   Diagnosis     Fatigue     Hyponatremia     Acute Lymphoma     Keloid scar     Abdominal Pain     Benign Essential Hypertension     Palpitations     Chronic Obstructive Pulmonary Disease     Wheezing (Symptom)     Constipation     Dermatitis     Lung mass     Muscle weakness (generalized)     Pneumonia     Prolapse of vaginal wall with midline cystocele     COPD exacerbation (H)     E. coli UTI       Current Outpatient Medications   Medication Sig Dispense Refill     acetaminophen (TYLENOL) 325 MG tablet [ACETAMINOPHEN (TYLENOL) 325 MG TABLET] Take 650 mg by mouth every 6 (six) hours as needed for pain.       albuterol (PROVENTIL HFA;VENTOLIN HFA) 90 mcg/actuation inhaler [ALBUTEROL (PROVENTIL HFA;VENTOLIN HFA) 90 MCG/ACTUATION INHALER] Inhale 2 puffs every 6 (six) hours as needed for wheezing.       amLODIPine (NORVASC) 5 MG tablet [AMLODIPINE (NORVASC) 5 MG TABLET] Take 5 mg by mouth daily.       aspirin 81 MG EC tablet Take 81 mg by mouth daily       atorvastatin (LIPITOR) 40 MG tablet Take 40 mg by mouth daily       cefdinir (OMNICEF) 300 MG capsule Take 1 capsule (300 mg) by mouth 2 times daily for 7 days 14 capsule 0     diphenhydrAMINE (BENADRYL) 25 MG tablet Take 1 tablet (25 mg) by mouth At Bedtime 30 tablet 0     guaiFENesin (MUCINEX) 600 MG 12 hr tablet Take 2 tablets (1,200 mg) by mouth 2 times daily for 10 days 40 tablet 0     polyethylene glycol (MIRALAX) 17 GM/Dose powder Take 17 g by mouth daily 510 g 0     predniSONE (DELTASONE) 20 MG tablet Take 2 tablets (40 mg) by mouth daily for 3 days 6 tablet 0       Social History     Tobacco Use     Smoking  "status: Never Smoker     Smokeless tobacco: Never Used   Substance Use Topics     Alcohol use: No     Drug use: None        No Known Allergies    No results found for this or any previous visit (from the past 24 hour(s)).         Review of Systems:     10 point ROS negative except for what is noted in HPI          Physical Exam:     Vitals:    02/14/22 1135 02/14/22 1138   BP: (!) 148/84 (!) 143/85   Pulse: 75    Resp: 28    Temp: 97.6  F (36.4  C)    TempSrc: Oral    SpO2: 98%    Weight: 65.8 kg (145 lb)    Height: 1.475 m (4' 10.07\")      Body mass index is 30.23 kg/m .    GENERAL APPEARANCE: healthy, alert and no distress, but tearful at times  EYES: Corneal cloudiness bilaterally, EOM grossly intact  RESP: Mild inspiratory and expiratory wheezing bilaterally.  CV: regular rate and rhythm. No lower ext. Edema  MS: extremities normal- no gross deformities noted  SKIN: Thick raised skin on right chest and upper abdomen. Scar from previous port placement noted on midline right upper chest.   NEURO: AXOX3, CN II - XII grossly normal.  PSYCH: Normal effect with mild distress  "

## 2022-02-14 NOTE — NURSING NOTE
Due to patient being non-English speaking/uses sign language, an  was used for this visit. Only for face-to-face interpretation by an external agency, date and length of interpretation can be found on the scanned worksheet.     name: Juan Crafword  Agency: [employer]  Language: Ros   Telephone number: [phone]  Type of interpretation: Face-to-face, spoken

## 2022-02-14 NOTE — PROGRESS NOTES
Faculty Supervision of Residents   I have examined this patient and the medical care has been evaluated and discussed with the resident. See resident note outlining our discussion.      Gladys Smith MD

## 2022-02-25 ENCOUNTER — TRANSFERRED RECORDS (OUTPATIENT)
Dept: HEALTH INFORMATION MANAGEMENT | Facility: CLINIC | Age: 77
End: 2022-02-25
Payer: MEDICARE

## 2022-03-16 ENCOUNTER — HOSPITAL ENCOUNTER (OUTPATIENT)
Dept: RESPIRATORY THERAPY | Facility: CLINIC | Age: 77
Discharge: HOME | End: 2022-03-16
Admitting: INTERNAL MEDICINE
Payer: MEDICARE

## 2022-03-16 DIAGNOSIS — J44.9 COPD (CHRONIC OBSTRUCTIVE PULMONARY DISEASE) (H): ICD-10-CM

## 2022-03-16 LAB — HGB BLD-MCNC: 12.6 G/DL (ref 11.7–15.7)

## 2022-03-16 PROCEDURE — 94726 PLETHYSMOGRAPHY LUNG VOLUMES: CPT

## 2022-03-16 PROCEDURE — 94729 DIFFUSING CAPACITY: CPT

## 2022-03-16 PROCEDURE — 85018 HEMOGLOBIN: CPT | Performed by: INTERNAL MEDICINE

## 2022-03-16 PROCEDURE — 999N000157 HC STATISTIC RCP TIME EA 10 MIN

## 2022-03-16 PROCEDURE — 250N000009 HC RX 250: Performed by: INTERNAL MEDICINE

## 2022-03-16 PROCEDURE — 94060 EVALUATION OF WHEEZING: CPT

## 2022-03-16 PROCEDURE — 36415 COLL VENOUS BLD VENIPUNCTURE: CPT | Performed by: INTERNAL MEDICINE

## 2022-03-16 RX ORDER — ALBUTEROL SULFATE 0.83 MG/ML
2.5 SOLUTION RESPIRATORY (INHALATION) ONCE
Status: COMPLETED | OUTPATIENT
Start: 2022-03-16 | End: 2022-03-16

## 2022-03-16 RX ADMIN — ALBUTEROL SULFATE 2.5 MG: 2.5 SOLUTION RESPIRATORY (INHALATION) at 11:00

## 2022-03-16 NOTE — PROGRESS NOTES
RESPIRATORY CARE NOTE     Patient Name: Rhett Alvarado  Today's Date: 3/16/2022     Complete PFT done with daughter interpreting. Pt performed tests with good effort. Test results meet ATS criteria..Albuterol neb given. Results scanned into epic. Pt left in no distress.       Carla Vences, RT

## 2022-03-18 ENCOUNTER — OFFICE VISIT (OUTPATIENT)
Dept: FAMILY MEDICINE | Facility: CLINIC | Age: 77
End: 2022-03-18
Payer: MEDICARE

## 2022-03-18 VITALS
WEIGHT: 141 LBS | SYSTOLIC BLOOD PRESSURE: 169 MMHG | OXYGEN SATURATION: 95 % | BODY MASS INDEX: 29.6 KG/M2 | HEIGHT: 58 IN | RESPIRATION RATE: 22 BRPM | TEMPERATURE: 97.7 F | HEART RATE: 79 BPM | DIASTOLIC BLOOD PRESSURE: 98 MMHG

## 2022-03-18 DIAGNOSIS — C85.90 LYMPHOMA, UNSPECIFIED BODY REGION, UNSPECIFIED LYMPHOMA TYPE (H): ICD-10-CM

## 2022-03-18 DIAGNOSIS — L91.0 KELOID SCAR: Primary | ICD-10-CM

## 2022-03-18 PROBLEM — E87.1 HYPONATREMIA: Status: ACTIVE | Noted: 2022-03-18

## 2022-03-18 PROBLEM — C83.30 MALIGNANT LYMPHOMA, LARGE CELL, DIFFUSE (H): Status: ACTIVE | Noted: 2022-02-25

## 2022-03-18 PROBLEM — I10 HYPERTENSIVE DISORDER: Status: ACTIVE | Noted: 2022-03-18

## 2022-03-18 PROBLEM — A15.9 TUBERCULOSIS: Status: ACTIVE | Noted: 2022-02-25

## 2022-03-18 PROBLEM — R73.03 PREDIABETES: Status: ACTIVE | Noted: 2022-02-25

## 2022-03-18 PROCEDURE — 99207 PR DROP WITH A PROCEDURE: CPT | Mod: 25 | Performed by: STUDENT IN AN ORGANIZED HEALTH CARE EDUCATION/TRAINING PROGRAM

## 2022-03-18 PROCEDURE — 11900 INJECT SKIN LESIONS </W 7: CPT | Mod: GC | Performed by: STUDENT IN AN ORGANIZED HEALTH CARE EDUCATION/TRAINING PROGRAM

## 2022-03-18 RX ORDER — TRIAMCINOLONE ACETONIDE 40 MG/ML
40 INJECTION, SUSPENSION INTRA-ARTICULAR; INTRAMUSCULAR ONCE
Status: COMPLETED | OUTPATIENT
Start: 2022-03-18 | End: 2022-03-18

## 2022-03-18 RX ORDER — TERCONAZOLE 8 MG/G
CREAM VAGINAL EVERY 24 HOURS
COMMUNITY
End: 2022-05-04

## 2022-03-18 RX ORDER — OXYCODONE HYDROCHLORIDE 5 MG/1
5 TABLET ORAL EVERY 6 HOURS PRN
Qty: 6 TABLET | Refills: 0 | Status: SHIPPED | OUTPATIENT
Start: 2022-03-18 | End: 2022-03-21

## 2022-03-18 RX ADMIN — TRIAMCINOLONE ACETONIDE 40 MG: 40 INJECTION, SUSPENSION INTRA-ARTICULAR; INTRAMUSCULAR at 17:14

## 2022-03-18 NOTE — NURSING NOTE
Due to patient being non-English speaking/uses sign language, an  was used for this visit. Only for face-to-face interpretation by an external agency, date and length of interpretation can be found on the scanned worksheet.     name: Elizabeth De Santiago  Agency: [employer]  Language: Ros   Telephone number: [phone]  Type of interpretation: Face-to-face, spoken

## 2022-03-18 NOTE — PROGRESS NOTES
Assessment and Plan   (L91.0) Keloid scar  (primary encounter diagnosis)  Comment: Extremely painful and itchy but no evidence of cellulitis or other obvious complication. Opted for injection with Kenalog of bilateral breast and L. shoulder lesions. Will have follow up with derm asap.   Plan:   Adult Dermatology Referral  Triamcinolone (KENALOG-40) injection 40 mg Keloid injection, procedure note as below.  Oxycodone 5 mg at night to help with pain.          (C85.90) Lymphoma, unspecified body region, unspecified lymphoma type (H)  Comment: Historical problem, patient states in remission.  Plan: No additional intervention at this time.    Follow up with Dermatology. If unable to be seen by dermatology, come back to clinic next week.    Options for treatment and follow-up care were reviewed with the patient and/or guardian. Rhett Alvarado and/or guardian engaged in the decision making process and verbalized understanding of the options discussed and agreed with the final plan.    Cherise Gordillo MS3    Resident/Fellow Attestation   I, Matthew Diop, was present with the medical/PILO student who participated in the service and in the documentation of the note.  I have verified the history and personally performed the physical exam and medical decision making.  I agree with the assessment and plan of care as documented in the note.      Igor Diop MD  [facility], PGY-3    Precepted patient with Dr. Brianna Peña.       HPI:   Rhett Alvarado is a 76 year old female who presents to clinic today for   Chief Complaint   Patient presents with     RECHECK     keloids     Reports painful lesions on bilateral breasts and shoulders for multiple years. Past week have been extremly painful preventing her from sleeping. Bilateral breast lesions most painful. She has previously been seen by a dermatologist 4 years ago in [place] with keloid  injections. Describes as sharp, stabbing pain. Lesions have recently enlarged in past year. Reports bilateral breast lesions developed from her bra wire. Bilateral shoulder lesions developed from moles being removed with battery  This is a typical way to remove moles in [facility] culture per pt There is a new keloid lesion developing near previous port site.     A [employer]  was used for this visit         Review of Systems:     Complete ROS negative except as noted in HPI.         PMHX:   Active Problems List  Patient Active Problem List   Diagnosis     Fatigue     Hyponatremia     Acute Lymphoma     Keloid scar     Abdominal Pain     Benign Essential Hypertension     Palpitations     Chronic Obstructive Pulmonary Disease     Wheezing (Symptom)     Constipation     Dermatitis     Lung mass     Muscle weakness (generalized)     Pneumonia     Prolapse of vaginal wall with midline cystocele     COPD exacerbation (H)     E. coli UTI     Hypertensive disorder     Hyponatremia     Malignant lymphoma, large cell, diffuse (H)     Prediabetes     Tuberculosis     Active problem list reviewed and updated.    Current Medications  Current Outpatient Medications   Medication Sig Dispense Refill     amLODIPine (NORVASC) 5 MG tablet [AMLODIPINE (NORVASC) 5 MG TABLET] Take 5 mg by mouth daily.       aspirin 81 MG EC tablet Take 81 mg by mouth daily       oxyCODONE (ROXICODONE) 5 MG tablet Take 1 tablet (5 mg) by mouth every 6 hours as needed for severe pain 6 tablet 0     acetaminophen (TYLENOL) 325 MG tablet [ACETAMINOPHEN (TYLENOL) 325 MG TABLET] Take 650 mg by mouth every 6 (six) hours as needed for pain.       albuterol (PROAIR HFA/PROVENTIL HFA/VENTOLIN HFA) 108 (90 Base) MCG/ACT inhaler Inhale 2 puffs into the lungs every 6 hours as needed for shortness of breath / dyspnea or wheezing 18 g 1     atorvastatin (LIPITOR) 40 MG tablet Take 40 mg by mouth daily       hydrocortisone (CORTAID) 1 % external ointment Apply  "topically 2 times daily as needed for rash or itching 30 g 1     polyethylene glycol (MIRALAX) 17 GM/Dose powder Take 17 g by mouth daily 510 g 0     terconazole (TERAZOL 3) 0.8 % vaginal cream every 24 hours       tiotropium (SPIRIVA RESPIMAT) 2.5 MCG/ACT inhaler Inhale 2 puffs into the lungs daily 4 g 1     Medication list reviewed and updated.    Social History  Social History     Tobacco Use     Smoking status: Never Smoker     Smokeless tobacco: Never Used   Substance Use Topics     Alcohol use: No     Drug use: None     History   Drug Use Not on file       Family History  Family History   Problem Relation Age of Onset     No Known Problems Mother      No Known Problems Father      No Known Problems Sister      No Known Problems Daughter      No Known Problems Maternal Grandmother      No Known Problems Maternal Grandfather      No Known Problems Paternal Grandmother      No Known Problems Paternal Grandfather      No Known Problems Maternal Aunt      No Known Problems Paternal Aunt      Hereditary Breast and Ovarian Cancer Syndrome No family hx of      Breast Cancer No family hx of      Cancer No family hx of      Colon Cancer No family hx of      Endometrial Cancer No family hx of      Ovarian Cancer No family hx of        Allergies  Allergies   Allergen Reactions     No Known Allergies             Physical Exam:     Vitals:    03/18/22 1532 03/18/22 1534   BP: (!) 166/98 (!) 169/98   Pulse: 79    Resp: 22    Temp: 97.7  F (36.5  C)    SpO2: 95%    Weight: 64 kg (141 lb)    Height: 1.473 m (4' 10\")      Body mass index is 29.47 kg/m .    GENERAL APPEARANCE: alert, appears stated age, no acute distress. Tearful on exam from pain  RESP: no increased respiratory effort.  CV: good capillary refill.  MSK: extremities normal, no gross deformities noted, no lower extremity edema.  SKIN: Two erythemonous, large (>5cm long) thickened scars on bilateral breasts. Two 3x3 cm erythematous thickened scars on upper " posterior shoulders. Not tender to palpation.  NEURO: mentation appears intact and speech normal.    Keloid injection procedure note:  Verbal consent obtained.  Large keloid scars identified on bilateral lateral aspects of breasts and bilateral upper posterior shoulders.  Mixed 1 mL 40 mg triamcinolone with 4 mL 1% lidocaine with epinephrine, swabbed the injection sites with alcohol pads, and injected the full volume into as much of the area of the bilateral breast keloids and the posterior upper R shoulder keloid as possible.  Patient tolerated the procedure well without complication.

## 2022-03-19 ENCOUNTER — TELEPHONE (OUTPATIENT)
Dept: DERMATOLOGY | Facility: CLINIC | Age: 77
End: 2022-03-19
Payer: MEDICARE

## 2022-03-19 NOTE — TELEPHONE ENCOUNTER
[facility] Call Center    Phone Message    May a detailed message be left on voicemail: yes     Reason for Call: Appointment Intake      Referring Provider Name: Matthew Diop MD in [facility]    Diagnosis and/or Symptoms: Skin Lesion - Keloid scar; Large keloids, extremely painful, much larger in the last year (did not request removal)    Please call Pt's Daughter Shilpa De Santiago who speaks English - she does Pt's scheduling and takes her to her Appts    Pt has New Pt Derm Appt with [facility] Referral Order requesting [place] location only please - Referral Order says Urgent: 3-5 Days    I scheduled first available and wait listed - please review Referral and Records and call Pt's daughter to schedule Pt a sooner opening per protocol on Urgent Referrals    Pt's daughter said she was seen someplace before but does not remember where - Records in [facility] is all she knows of for now    Thanks! (Daughter's name pronounced Conrad)          Action Taken: Message routed to:  Other: [facility]    Travel Screening: Not Applicable

## 2022-03-20 LAB
DLCOCOR-%PRED-PRE: 85 %
DLCOCOR-PRE: 12.72 ML/MIN/MMHG
DLCOUNC-%PRED-PRE: 83 %
DLCOUNC-PRE: 12.4 ML/MIN/MMHG
DLCOUNC-PRED: 14.87 ML/MIN/MMHG
ERV-%PRED-PRE: 109 %
ERV-PRE: 0.33 L
ERV-PRED: 0.3 L
EXPTIME-PRE: 7.43 SEC
FEF2575-%PRED-POST: 31 %
FEF2575-%PRED-PRE: 25 %
FEF2575-POST: 0.43 L/SEC
FEF2575-PRE: 0.35 L/SEC
FEF2575-PRED: 1.37 L/SEC
FEFMAX-%PRED-PRE: 40 %
FEFMAX-PRE: 1.72 L/SEC
FEFMAX-PRED: 4.23 L/SEC
FEV1-%PRED-PRE: 55 %
FEV1-PRE: 0.84 L
FEV1FEV6-PRE: 50 %
FEV1FEV6-PRED: 78 %
FEV1FVC-PRE: 49 %
FEV1FVC-PRED: 79 %
FEV1SVC-PRE: 60 %
FEV1SVC-PRED: 73 %
FIFMAX-PRE: 1.08 L/SEC
FRCPLETH-%PRED-PRE: 139 %
FRCPLETH-PRE: 3.24 L
FRCPLETH-PRED: 2.32 L
FVC-%PRED-PRE: 90 %
FVC-PRE: 1.73 L
FVC-PRED: 1.91 L
IC-%PRED-PRE: 60 %
IC-PRE: 1.07 L
IC-PRED: 1.76 L
RVPLETH-%PRED-PRE: 159 %
RVPLETH-PRE: 2.91 L
RVPLETH-PRED: 1.82 L
TLCPLETH-%PRED-PRE: 114 %
TLCPLETH-PRE: 4.31 L
TLCPLETH-PRED: 3.77 L
VA-%PRED-PRE: 88 %
VA-PRE: 3.05 L
VC-%PRED-PRE: 67 %
VC-PRE: 1.4 L
VC-PRED: 2.07 L

## 2022-03-21 NOTE — TELEPHONE ENCOUNTER
Called and spoke with daughter- [place] is too far for them to drive- they will keep the [place] appointment  Offered appointment at [place] next week  Thank you,    Zainab EVERETTRN BSN  [facility]- [phone]

## 2022-03-21 NOTE — PROGRESS NOTES
Pulmonary Clinic Outpatient Consultation  3/22/2022     Assessment and Plan:   #. COPD, more frequent exacerbations in [place] rather than [place]. Has not been able to afford appropriate maintenance inhalers because her insurance is not covering them. Her clinical exam does not show an acute exacerbation at this time.      Trial of Combivent TID as her maintenance    Refills for DuoNeb    Referral to Pharmacy Liaison for Medication Coverage    RTC 3 months.    Vida Marquez MD  Pulmonary and Critical Care   ______________________________________________________________________________    CC: dyspnea, recent hospitalization, cannot afford maintenance medications w/ her insurance    HPI:   Rhett Alvarado is a 76 year old non-smoker with history of COPD with moderately severe obstruction and radiographic changes consistent with chronic mediastinitis (per radiology) stable since 2013, AECOPD mid February 2022, lymphoma in remission presenting today for evaluation of dyspnea.  Chronic relevant medications include Spiriva (started 2/14/2022) and albuterol.    Reports that her breathing is a problem but her inhaler & nebulizer treatments help. She has been on some sort of an inhaler for about 7 years. She cannot afford a maintenance inhaler, so did not use Spiriva. She has been on albuterol -- approximately 2x/day, every day. She becomes short of breath when she is depressed (when her  yells at her) and when she has to walk fast even on flat ground. She states that she is not as dyspneic when she is not depressed. When she uses albuterol, it reliably helps her breathing. She does have a nebulizer machine when her albuterol is not helping. She ran out of her nebulized medication about a month ago. She only wheezes when she is sick, which happened about once every couple of years when she lived in [place] ([id number]). When she lived in [place] [id number] she did get sick more often than when she lived in [place].     ROS: 10 point ROS  "reviewed and noted to be negative w/ exceptions as detailed in the HPI.    Physical Exam:  BP (!) 155/88 (BP Location: Left arm, Patient Position: Sitting, Cuff Size: Adult Regular)   Pulse 77   Resp 28   Ht 1.48 m (4' 10.27\")   Wt 63.6 kg (140 lb 5 oz)   SpO2 96%   BMI 29.06 kg/m    Gen: alert, oriented, no distress  HEENT: masked, PERRL; no stridor  CV: RRR, no M/G/R  Resp: equal bilateral air entry, breath sounds clear throughout, no focal crackles or wheezes; able to converse in full sentences w/ no respiratory distress  Abd: soft, nontender, no palpable organomegaly  Skin: no apparent rashes  Ext: no cyanosis, no clubbing, no BLE edema  Neuro: alert, nonfocal    Labs: personally reviewed in the EMR.  Imaging studies: personally reviewed and interpreted. Below are the Radiology interpretations.  #. CTA chest, 2/9/2022:  ANGIOGRAM CHEST: No evidence pulmonary embolism. Main pulmonary artery is 3.5 cm. This suggests pulmonary arterial hypertension. No aortic dissection. Stable ascending aortic 4.2 cm aneurysm. No CT evidence of right heart strain.  LUNGS AND PLEURA: Chronic left upper lobe atelectasis and scar. Chronic right middle lobe atelectasis and scar. The left upper lobe and middle lobe bronchi are obstructed by heterogeneous perihilar soft tissue which extends into the mediastinum. This was present in 2013, and presumably due to chronic mediastinitis. Soft tissue thickening surrounds the right and left lower lobe bronchi, similar to previous. No effusion.  MEDIASTINUM/AXILLAE: Several small aortopulmonary window lymph nodes. Stable right thyroid 2.5 cm nodule.   CORONARY ARTERY CALCIFICATION: None.  UPPER ABDOMEN: Normal.  MUSCULOSKELETAL: Lower thoracic vertebral body hemangioma.    PFT's (3/16/2022): Moderately severe obstruction without clinically significant bronchodilator response, presence of air trapping without hyperinflation, normal diffusion capacity.      PMH:  Patient Active Problem List "    Diagnosis Date Noted     Hypertensive disorder 03/18/2022     Priority: Medium     Hyponatremia 03/18/2022     Priority: Medium     Formatting of this note might be different from the original.  Created by Conversion       Malignant lymphoma, large cell, diffuse (H) 02/25/2022     Priority: Medium     Prediabetes 02/25/2022     Priority: Medium     Tuberculosis 02/25/2022     Priority: Medium     E. coli UTI 02/10/2022     Priority: Medium     COPD exacerbation (H) 02/09/2022     Priority: Medium     Abdominal Pain      Priority: Medium     Created by Conversion  Replacement Utility updated for latest IMO load         Constipation      Priority: Medium     Created by Conversion  Replacement Utility updated for latest IMO load         Dermatitis 01/26/2015     Priority: Medium     face         Lung mass 01/26/2015     Priority: Medium     Muscle weakness (generalized) 01/26/2015     Priority: Medium     Pneumonia 01/26/2015     Priority: Medium     Prolapse of vaginal wall with midline cystocele 01/26/2015     Priority: Medium     Chronic Obstructive Pulmonary Disease      Priority: Medium     Created by Conversion         Wheezing (Symptom)      Priority: Medium     Created by Conversion         Fatigue      Priority: Medium     Created by Conversion         Hyponatremia      Priority: Medium     Created by Conversion         Acute Lymphoma      Priority: Medium     Created by Conversion  [facility] Annotation: Feb 8 2012  4:54PM - Catalina Rivera: B-cellIn   remission         Keloid scar      Priority: Medium     Created by Conversion         Benign Essential Hypertension      Priority: Medium     Created by Conversion         Palpitations      Priority: Medium     Created by Conversion           PSH:  Past Surgical History:   Procedure Laterality Date     IR MISCELLANEOUS PROCEDURE  2/20/2012     IR PORT REMOVAL  7/20/2012       Family HX: family history includes No Known Problems in her daughter, father,  maternal aunt, maternal grandfather, maternal grandmother, mother, paternal aunt, paternal grandfather, paternal grandmother, and sister.    Social Hx:  reports that she has never smoked. She has never used smokeless tobacco. She reports that she does not drink alcohol.     Current Meds:  Current Outpatient Medications   Medication Sig Dispense Refill     acetaminophen (TYLENOL) 325 MG tablet [ACETAMINOPHEN (TYLENOL) 325 MG TABLET] Take 650 mg by mouth every 6 (six) hours as needed for pain.       albuterol (PROAIR HFA/PROVENTIL HFA/VENTOLIN HFA) 108 (90 Base) MCG/ACT inhaler Inhale 2 puffs into the lungs every 6 hours as needed for shortness of breath / dyspnea or wheezing 18 g 1     amLODIPine (NORVASC) 5 MG tablet [AMLODIPINE (NORVASC) 5 MG TABLET] Take 5 mg by mouth daily.       aspirin 81 MG EC tablet Take 81 mg by mouth daily       atorvastatin (LIPITOR) 40 MG tablet Take 40 mg by mouth daily       hydrocortisone (CORTAID) 1 % external ointment Apply topically 2 times daily as needed for rash or itching 30 g 1     oxyCODONE (ROXICODONE) 5 MG tablet Take 1 tablet (5 mg) by mouth every 6 hours as needed for severe pain 6 tablet 0     polyethylene glycol (MIRALAX) 17 GM/Dose powder Take 17 g by mouth daily 510 g 0     terconazole (TERAZOL 3) 0.8 % vaginal cream every 24 hours       tiotropium (SPIRIVA RESPIMAT) 2.5 MCG/ACT inhaler Inhale 2 puffs into the lungs daily 4 g 1       Allergies:  Allergies   Allergen Reactions     No Known Allergies

## 2022-03-22 ENCOUNTER — OFFICE VISIT (OUTPATIENT)
Dept: PULMONOLOGY | Facility: OTHER | Age: 77
End: 2022-03-22
Payer: MEDICARE

## 2022-03-22 VITALS
SYSTOLIC BLOOD PRESSURE: 155 MMHG | RESPIRATION RATE: 28 BRPM | HEART RATE: 77 BPM | DIASTOLIC BLOOD PRESSURE: 88 MMHG | HEIGHT: 58 IN | WEIGHT: 140.31 LBS | OXYGEN SATURATION: 96 % | BODY MASS INDEX: 29.45 KG/M2

## 2022-03-22 DIAGNOSIS — J44.9 CHRONIC OBSTRUCTIVE PULMONARY DISEASE, UNSPECIFIED COPD TYPE (H): ICD-10-CM

## 2022-03-22 PROCEDURE — 99204 OFFICE O/P NEW MOD 45 MIN: CPT | Performed by: INTERNAL MEDICINE

## 2022-03-22 RX ORDER — IPRATROPIUM BROMIDE AND ALBUTEROL SULFATE 2.5; .5 MG/3ML; MG/3ML
1 SOLUTION RESPIRATORY (INHALATION) EVERY 6 HOURS PRN
Qty: 360 ML | Refills: 3 | Status: SHIPPED | OUTPATIENT
Start: 2022-03-22 | End: 2022-07-07

## 2022-03-22 NOTE — PROGRESS NOTES
Preceptor Attestation:   Patient seen, evaluated and discussed with the resident. I was present for and supervised the entire procedure. I have verified the content of the note, which accurately reflects my assessment of the patient and the plan of care.  Supervising Physician:Brianna Peña MD  [facility]

## 2022-03-22 NOTE — PATIENT INSTRUCTIONS
Plan:    I am sending you refills for your nebulizer machine to use when you are sick    You can continue using your albuterol inhaler when you urgently need it when you are suddenly short of breath.    I am sending you a new inhaler COMBIVENT, that I want you to use 3 times/day. Hopefully with this new inhaler you will not need your old inhaler as often.     I put in a referral to a pharmacist to work with your insurance to see what inhalers they will cover for your COPD    You should follow up in 2-3 months.     If you have any questions or concerns, please, call our clinic at [phone].

## 2022-04-20 ENCOUNTER — OFFICE VISIT (OUTPATIENT)
Dept: FAMILY MEDICINE | Facility: CLINIC | Age: 77
End: 2022-04-20
Payer: COMMERCIAL

## 2022-04-20 VITALS
HEIGHT: 58 IN | TEMPERATURE: 98.4 F | RESPIRATION RATE: 18 BRPM | DIASTOLIC BLOOD PRESSURE: 86 MMHG | BODY MASS INDEX: 29.6 KG/M2 | OXYGEN SATURATION: 98 % | HEART RATE: 79 BPM | SYSTOLIC BLOOD PRESSURE: 143 MMHG | WEIGHT: 141 LBS

## 2022-04-20 DIAGNOSIS — H04.123 DRY EYES: ICD-10-CM

## 2022-04-20 DIAGNOSIS — R73.03 PREDIABETES: ICD-10-CM

## 2022-04-20 DIAGNOSIS — I10 ESSENTIAL HYPERTENSION, BENIGN: ICD-10-CM

## 2022-04-20 DIAGNOSIS — H54.40 BLINDNESS OF RIGHT EYE, UNSPECIFIED LEFT EYE VISUAL IMPAIRMENT CATEGORY: Primary | ICD-10-CM

## 2022-04-20 DIAGNOSIS — N18.2 CKD (CHRONIC KIDNEY DISEASE) STAGE 2, GFR 60-89 ML/MIN: ICD-10-CM

## 2022-04-20 DIAGNOSIS — K08.9 TOOTH DISEASE: ICD-10-CM

## 2022-04-20 DIAGNOSIS — E78.5 HYPERLIPIDEMIA, UNSPECIFIED HYPERLIPIDEMIA TYPE: ICD-10-CM

## 2022-04-20 DIAGNOSIS — J44.9 CHRONIC OBSTRUCTIVE PULMONARY DISEASE, UNSPECIFIED COPD TYPE (H): ICD-10-CM

## 2022-04-20 PROCEDURE — 99214 OFFICE O/P EST MOD 30 MIN: CPT | Mod: GC | Performed by: STUDENT IN AN ORGANIZED HEALTH CARE EDUCATION/TRAINING PROGRAM

## 2022-04-20 RX ORDER — ATORVASTATIN CALCIUM 40 MG/1
40 TABLET, FILM COATED ORAL DAILY
Qty: 90 TABLET | Refills: 1 | Status: SHIPPED | OUTPATIENT
Start: 2022-04-20 | End: 2022-11-03

## 2022-04-20 RX ORDER — CARBOXYMETHYLCELLULOSE SODIUM 10 MG/ML
1 GEL OPHTHALMIC 2 TIMES DAILY PRN
Qty: 30 EACH | Refills: 1 | Status: SHIPPED | OUTPATIENT
Start: 2022-04-20 | End: 2023-04-04

## 2022-04-20 NOTE — PROGRESS NOTES
Assessment and Plan     (H54.40) Blindness of right eye, unspecified left eye visual impairment category  (primary encounter diagnosis)  Comment: Hx of patient endorsed hx of glaucoma of right eye for which she was getting injections for when she was in [place]. Stopped getting them and started having worsening blindness of right eye; now right eye essentially completely blind but wants to follow-up with eye doctor here in [place].  Plan:   -Adult Eye Referral    (K08.9) Tooth disease  Comment: Hx of many molars being removed for prior tooth disease and has partials on top and bottom. Currently has rotting teeth of both top and bottoms and wants them pulled.  Plan:   -Dental Referral    (N18.2) CKD (chronic kidney disease) stage 2, GFR 60-89 ml/min  Comment: GFR 61 on BMP 2 months ago. Hx of prediabetes and HTN. Not on ACE/ARB  Plan:   -Consider BMP at next visit; would also consider starting ACE/ARB but certainly reducing other risk factors    (R73.03) Prediabetes  Comment: A1c 2 months ago 6.2. Not on any medications.   Plan:   -Return visit in a week to discuss further    (J44.9) Chronic obstructive pulmonary disease, unspecified COPD type (H)  Comment: Follows with pulm here in [place], established 2 months ago. Has not had exacerbations since starting new inhaler regimen.  Plan:   -Continue regular pulm follow-up and maintenance meds    (H04.123) Dry eyes  Comment: Chronic hx of dry eyes. Maybe some blepharitis contributing. Does not sound allergic in nature.  Plan:   -carboxymethylcellulose PF (REFRESH LIQUIGEL) 1% ophthalmic gel  -Counseled on supportive cares/eye hygiene     (E78.5) Hyperlipidemia, unspecified hyperlipidemia type  Comment: Was taking lipitor 40mg but hasn't for last 2 months as she wasn't sure if she should continue.  Plan:   -Refilled atorvastatin (LIPITOR) 40 MG tablet and recommended restarting daily  -Obtain lipid studies at next visit    (I10) Benign Essential Hypertension  Comment: BP today  "143/86. Unclear what meds she's truly taking for this but appears to just be 10mg amlodipine daily.  Plan:   -Return appt in a week; counseled to bring medications with then so we can determine what she's taking        Options for treatment and follow-up care were reviewed with the patient and/or guardian. Rhett Alvarado and/or guardian engaged in the decision making process and verbalized understanding of the options discussed and agreed with the final plan.    Manoj Burch MD      Precepted today with: Jorge Stockton MD           HPI:       Rhett Alvarado is a 77 year old  female with hx of COPD, HTN, who presents for the following:    Wants dental referral.  Hasn't seen dentist in 2-3 years.  Multiple broken and rotting teeth on top and bottom anteriorly.  Has partial on both top and bottom.     In 1982 \"she had some tissue removed because it was covering her eye.\"  Says she can't see out of right eye, just light, but can't see my face even when i'm very close.  Prior had injections into right eye when living in [place] but stopped and now vision worsened. Concerned about this greatly.    A [employer]  was used for this visit         PMHX:     Patient Active Problem List   Diagnosis     Fatigue     Hyponatremia     Acute Lymphoma     Keloid scar     Abdominal Pain     Benign Essential Hypertension     Palpitations     Chronic Obstructive Pulmonary Disease     Wheezing (Symptom)     Constipation     Dermatitis     Lung mass     Muscle weakness (generalized)     Pneumonia     Prolapse of vaginal wall with midline cystocele     COPD exacerbation (H)     E. coli UTI     Hypertensive disorder     Hyponatremia     Malignant lymphoma, large cell, diffuse (H)     Prediabetes     Tuberculosis       Current Outpatient Medications   Medication Sig Dispense Refill     atorvastatin (LIPITOR) 40 MG tablet Take 1 tablet (40 mg) by mouth daily 90 tablet 1     carboxymethylcellulose PF (REFRESH LIQUIGEL) 1 % ophthalmic gel Place 1 " drop into both eyes 2 times daily as needed for dry eyes 30 each 1     acetaminophen (TYLENOL) 325 MG tablet [ACETAMINOPHEN (TYLENOL) 325 MG TABLET] Take 650 mg by mouth every 6 (six) hours as needed for pain. (Patient not taking: Reported on 3/22/2022)       albuterol (PROAIR HFA/PROVENTIL HFA/VENTOLIN HFA) 108 (90 Base) MCG/ACT inhaler Inhale 2 puffs into the lungs every 6 hours as needed for shortness of breath / dyspnea or wheezing 18 g 1     amLODIPine (NORVASC) 5 MG tablet [AMLODIPINE (NORVASC) 5 MG TABLET] Take 5 mg by mouth daily. (Patient not taking: Reported on 3/22/2022)       aspirin 81 MG EC tablet Take 81 mg by mouth daily (Patient not taking: Reported on 3/22/2022)       hydrocortisone (CORTAID) 1 % external ointment Apply topically 2 times daily as needed for rash or itching (Patient not taking: Reported on 3/22/2022) 30 g 1     ipratropium - albuterol 0.5 mg/2.5 mg/3 mL (DUONEB) 0.5-2.5 (3) MG/3ML neb solution Take 1 vial (3 mLs) by nebulization every 6 hours as needed for shortness of breath / dyspnea or wheezing 360 mL 3     ipratropium-albuterol (COMBIVENT RESPIMAT)  MCG/ACT inhaler Inhale 1 puff into the lungs 3 times daily 4 g 11     polyethylene glycol (MIRALAX) 17 GM/Dose powder Take 17 g by mouth daily (Patient not taking: Reported on 3/22/2022) 510 g 0     terconazole (TERAZOL 3) 0.8 % vaginal cream every 24 hours (Patient not taking: Reported on 3/22/2022)       tiotropium (SPIRIVA RESPIMAT) 2.5 MCG/ACT inhaler Inhale 2 puffs into the lungs daily 4 g 1       Social History     Tobacco Use     Smoking status: Never Smoker     Smokeless tobacco: Never Used   Substance Use Topics     Alcohol use: No          Allergies   Allergen Reactions     No Known Allergies        No results found for this or any previous visit (from the past 24 hour(s)).         Review of Systems:     10 point ROS negative except for what is noted in HPI          Physical Exam:     Vitals:    04/20/22 1509 04/20/22  "1511   BP: (!) 146/88 (!) 143/86   Pulse: 79    Resp: 18    Temp: 98.4  F (36.9  C)    SpO2: 98%    Weight: 64 kg (141 lb)    Height: 1.473 m (4' 10\")      Body mass index is 29.47 kg/m .    General: Sitting comfortably. No acute distress.   HEENT: Blind right eye with minimal to no pupillary response with thickened corneal appearance.   Neck: Normal.  Respiratory: No respiratory distress while resting. No wheezing.  Cardiac: RRR. No m/g/r. Radial pulses 2+ bilaterally.  Abdominal: Abdomen is soft and non-tender without distention.  Extremities: Upper and lower extremities grossly normal.  Skin: Skin in warm without rashes. Multiple SKs present over neck/face.  Neurological: Motor function is grossly normal. Blind in right eye.   Psychiatric: Good insight and understanding. Well kempt.    "

## 2022-04-20 NOTE — PROGRESS NOTES
Preceptor Attestation:   Patient seen, evaluated and discussed with the resident. I have verified the content of the note, which accurately reflects my assessment of the patient and the plan of care.  Supervising Physician:Jorge Stockton MD  [facility]

## 2022-04-20 NOTE — NURSING NOTE
Due to patient being non-English speaking/uses sign language, an  was used for this visit. Only for face-to-face interpretation by an external agency, date and length of interpretation can be found on the scanned worksheet.     name: Ana   Agency: [employer]  Language: Ros   Telephone number: [phone]  Type of interpretation: Face-to-face, spoken

## 2022-04-21 NOTE — PATIENT INSTRUCTIONS
Referral for :     Ophthalmology    LOCATION/PLACE/Provider :    [facility]   DATE & TIME :    Faxed   PHONE :     [phone]  FAX :    [phone]  Appointment made by clinic staff/:    Megan

## 2022-05-04 ENCOUNTER — OFFICE VISIT (OUTPATIENT)
Dept: FAMILY MEDICINE | Facility: CLINIC | Age: 77
End: 2022-05-04
Payer: COMMERCIAL

## 2022-05-04 VITALS
OXYGEN SATURATION: 96 % | SYSTOLIC BLOOD PRESSURE: 122 MMHG | BODY MASS INDEX: 28.45 KG/M2 | HEIGHT: 59 IN | DIASTOLIC BLOOD PRESSURE: 79 MMHG | HEART RATE: 73 BPM | WEIGHT: 141.12 LBS | RESPIRATION RATE: 16 BRPM | TEMPERATURE: 98.3 F

## 2022-05-04 DIAGNOSIS — Z00.00 PREVENTATIVE HEALTH CARE: ICD-10-CM

## 2022-05-04 DIAGNOSIS — L91.0 KELOID SCAR: ICD-10-CM

## 2022-05-04 DIAGNOSIS — Z78.0 POSTMENOPAUSAL STATUS: ICD-10-CM

## 2022-05-04 DIAGNOSIS — Z23 HIGH PRIORITY FOR 2019-NCOV VACCINE: ICD-10-CM

## 2022-05-04 DIAGNOSIS — N18.2 CKD (CHRONIC KIDNEY DISEASE) STAGE 2, GFR 60-89 ML/MIN: Primary | ICD-10-CM

## 2022-05-04 DIAGNOSIS — J44.9 CHRONIC OBSTRUCTIVE PULMONARY DISEASE, UNSPECIFIED COPD TYPE (H): ICD-10-CM

## 2022-05-04 DIAGNOSIS — E78.5 HYPERLIPIDEMIA, UNSPECIFIED HYPERLIPIDEMIA TYPE: ICD-10-CM

## 2022-05-04 DIAGNOSIS — I10 ESSENTIAL HYPERTENSION, BENIGN: ICD-10-CM

## 2022-05-04 DIAGNOSIS — R73.03 PREDIABETES: ICD-10-CM

## 2022-05-04 LAB
ANION GAP SERPL CALCULATED.3IONS-SCNC: 10 MMOL/L (ref 5–18)
BUN SERPL-MCNC: 19 MG/DL (ref 8–28)
CALCIUM SERPL-MCNC: 9 MG/DL (ref 8.5–10.5)
CHLORIDE BLD-SCNC: 106 MMOL/L (ref 98–107)
CHOLEST SERPL-MCNC: 239 MG/DL
CO2 SERPL-SCNC: 22 MMOL/L (ref 22–31)
CREAT SERPL-MCNC: 0.79 MG/DL (ref 0.6–1.1)
FASTING STATUS PATIENT QL REPORTED: ABNORMAL
GFR SERPL CREATININE-BSD FRML MDRD: 77 ML/MIN/1.73M2
GLUCOSE BLD-MCNC: 80 MG/DL (ref 70–125)
HDLC SERPL-MCNC: 58 MG/DL
LDLC SERPL CALC-MCNC: 153 MG/DL
POTASSIUM BLD-SCNC: 4.1 MMOL/L (ref 3.5–5)
SODIUM SERPL-SCNC: 138 MMOL/L (ref 136–145)
TRIGL SERPL-MCNC: 139 MG/DL

## 2022-05-04 PROCEDURE — 0054A COVID-19,PF,PFIZER (12+ YRS): CPT | Performed by: STUDENT IN AN ORGANIZED HEALTH CARE EDUCATION/TRAINING PROGRAM

## 2022-05-04 PROCEDURE — 91305 COVID-19,PF,PFIZER (12+ YRS): CPT | Performed by: STUDENT IN AN ORGANIZED HEALTH CARE EDUCATION/TRAINING PROGRAM

## 2022-05-04 PROCEDURE — 90471 IMMUNIZATION ADMIN: CPT | Performed by: STUDENT IN AN ORGANIZED HEALTH CARE EDUCATION/TRAINING PROGRAM

## 2022-05-04 PROCEDURE — 80048 BASIC METABOLIC PNL TOTAL CA: CPT | Performed by: STUDENT IN AN ORGANIZED HEALTH CARE EDUCATION/TRAINING PROGRAM

## 2022-05-04 PROCEDURE — 80061 LIPID PANEL: CPT | Performed by: STUDENT IN AN ORGANIZED HEALTH CARE EDUCATION/TRAINING PROGRAM

## 2022-05-04 PROCEDURE — 99214 OFFICE O/P EST MOD 30 MIN: CPT | Mod: 25 | Performed by: STUDENT IN AN ORGANIZED HEALTH CARE EDUCATION/TRAINING PROGRAM

## 2022-05-04 PROCEDURE — 36415 COLL VENOUS BLD VENIPUNCTURE: CPT | Performed by: STUDENT IN AN ORGANIZED HEALTH CARE EDUCATION/TRAINING PROGRAM

## 2022-05-04 PROCEDURE — 90715 TDAP VACCINE 7 YRS/> IM: CPT | Performed by: STUDENT IN AN ORGANIZED HEALTH CARE EDUCATION/TRAINING PROGRAM

## 2022-05-04 RX ORDER — BETAMETHASONE VALERATE 1.2 MG/G
CREAM TOPICAL 2 TIMES DAILY
Qty: 45 G | Refills: 1 | Status: SHIPPED | OUTPATIENT
Start: 2022-05-04 | End: 2023-07-04

## 2022-05-04 RX ORDER — AMLODIPINE BESYLATE 5 MG/1
5 TABLET ORAL DAILY
Qty: 90 TABLET | Refills: 1 | Status: SHIPPED | OUTPATIENT
Start: 2022-05-04 | End: 2022-08-08

## 2022-05-04 NOTE — PROGRESS NOTES
Assessment and Plan     (N18.2) CKD (chronic kidney disease) stage 2, GFR 60-89 ml/min  (primary encounter diagnosis)  Comment: GFR 61 on BMP 3 months ago. Hx of prediabetes and HTN- controlled today 122/79. Not on ACE/ARB.  Plan:   -BMP  -Follow-up in 3 months  -In future could consider starting ACE/ARB dependent on BP    (E78.5) Hyperlipidemia, unspecified hyperlipidemia type  Comment: Was on atorvastatin 40mg prior but then stopped taking because she didn't know if she was supposed to continue or not.   Plan:  -Lipid Profile today  -Repeat lipid profile in 3 months  -Counseled on restarting atorvastatin 40mg daily    (I10) Essential hypertension, benign  Comment: Has been on amlodipine 5mg daily. Not checking BP at home. BP good today at 122/79.  Plan:   -Refilled amLODIPine (NORVASC) 5mg daily  -Counseled on how to check BP at home and to do this consistently and keep log    (R73.03) Prediabetes  Comment: Already got A1c (6.2) within the last 3 months; was unchanged from prior. Not on any medications.  Plan:   -Repeat A1c at next visit    (J44.9) Chronic obstructive pulmonary disease, unspecified COPD type (H)  Comment: Follows with pulm here in [place], established 3 months ago. Denies any exacerbations since starting new inhaler regimen. Taking spiriva respimat 2 puffs nightly and then uses albuterol prn (typically 2-3x a day). Uses duoneb only every few days when she feels herself wheezing.  Plan:   -Continue regular pulm follow-up   -Continue spiriva respimat 2 puffs nightly  -Continue prn albuterol and prn duonebs    (L91.0) Keloid scar  Comment: Large keloid in region of right bra line. States it came on years ago from her bra rubbing/scratching her skin and then just continued to worsen. Now doesn't wear bra. Been using low dose betamethasone topical for itching and irritation it causes. Is following up with derm later this month to attempt to get kenalog injections.  Plan:   -betamethasone valerate (VALISONE)  0.1 % external cream  -Follow-up with derm at end of month    (Z00.00) Preventative health care  (Z78.0) Postmenopausal status  Comment: Last Tdap 2008, willing to update today. Meets indication for DEXA screening.  Plan:  -DEXA HIP/PELVIS/SPINE - Future  -TDAP VACCINE (Adacel, Boostrix)      Options for treatment and follow-up care were reviewed with the patient and/or guardian. Rhett Alvarado and/or guardian engaged in the decision making process and verbalized understanding of the options discussed and agreed with the final plan.    Manoj Burch MD      Precepted today with: Jorge Stockton MD           HPI:       Rhett Alvarado is a 77 year old  female with pertinent hx of COPD, HTN, HLD, pre-diabetes who presents for the following:    Last visit a month ago:  (H54.40) Blindness of right eye, unspecified left eye visual impairment category  (primary encounter diagnosis)  Comment: Hx of patient endorsed hx of glaucoma of right eye for which she was getting injections for when she was in [place]. Stopped getting them and started having worsening blindness of right eye; now right eye essentially completely blind but wants to follow-up with eye doctor here in [place].  Plan:   -Adult Eye Referral     (K08.9) Tooth disease  Comment: Hx of many molars being removed for prior tooth disease and has partials on top and bottom. Currently has rotting teeth of both top and bottoms and wants them pulled.  Plan:   -Dental Referral     (N18.2) CKD (chronic kidney disease) stage 2, GFR 60-89 ml/min  Comment: GFR 61 on BMP 2 months ago. Hx of prediabetes and HTN. Not on ACE/ARB  Plan:   -Consider BMP at next visit; would also consider starting ACE/ARB but certainly reducing other risk factors     (R73.03) Prediabetes  Comment: A1c 2 months ago 6.2. Not on any medications.   Plan:   -Return visit in a week to discuss further     (J44.9) Chronic obstructive pulmonary disease, unspecified COPD type (H)  Comment: Follows with pulm here in [place],  established 2 months ago. Has not had exacerbations since starting new inhaler regimen.  Plan:   -Continue regular pulm follow-up and maintenance meds     (H04.123) Dry eyes  Comment: Chronic hx of dry eyes. Maybe some blepharitis contributing. Does not sound allergic in nature.  Plan:   -carboxymethylcellulose PF (REFRESH LIQUIGEL) 1% ophthalmic gel  -Counseled on supportive cares/eye hygiene      (E78.5) Hyperlipidemia, unspecified hyperlipidemia type  Comment: Was taking lipitor 40mg but hasn't for last 2 months as she wasn't sure if she should continue.  Plan:   -Refilled atorvastatin (LIPITOR) 40 MG tablet and recommended restarting daily  -Obtain lipid studies at next visit     (I10) Benign Essential Hypertension  Comment: BP today 143/86. Unclear what meds she's truly taking for this but appears to just be 10mg amlodipine daily.  Plan:   -Return appt in a week; counseled to bring medications with then so we can determine what she's taking    Todays visit:  Denies new issues.  States she's been taking her respiratory meds as prescribed and denies flare ups. Has not seen her pulm doc in a few months but follows with pulm as directed.    Taking amlodipine 5mg daily without issue. Not checking BP at home.  Has not restarted lipitor 40mg daily yet.  Taking baby aspirin daily.    C/o keloid over right chest wall at bra line. Says she puts steroid cream on this when it gets irritated/itchy and it helps. Has follow-up with derm at end of month.    Willing to get updated Tdap and covid booster today.    A [employer]  was used for this visit         PMHX:     Patient Active Problem List   Diagnosis     Fatigue     Hyponatremia     Acute Lymphoma     Keloid scar     Abdominal Pain     Benign Essential Hypertension     Palpitations     Chronic Obstructive Pulmonary Disease     Wheezing (Symptom)     Constipation     Dermatitis     Lung mass     Muscle weakness (generalized)     Pneumonia     Prolapse of vaginal  wall with midline cystocele     COPD exacerbation (H)     E. coli UTI     Hypertensive disorder     Hyponatremia     Malignant lymphoma, large cell, diffuse (H)     Prediabetes     Tuberculosis       Current Outpatient Medications   Medication Sig Dispense Refill     amLODIPine (NORVASC) 5 MG tablet Take 1 tablet (5 mg) by mouth daily 90 tablet 1     betamethasone valerate (VALISONE) 0.1 % external cream Apply topically 2 times daily 45 g 1     acetaminophen (TYLENOL) 325 MG tablet [ACETAMINOPHEN (TYLENOL) 325 MG TABLET] Take 650 mg by mouth every 6 (six) hours as needed for pain. (Patient not taking: Reported on 3/22/2022)       albuterol (PROAIR HFA/PROVENTIL HFA/VENTOLIN HFA) 108 (90 Base) MCG/ACT inhaler Inhale 2 puffs into the lungs every 6 hours as needed for shortness of breath / dyspnea or wheezing 18 g 1     aspirin 81 MG EC tablet Take 81 mg by mouth daily (Patient not taking: Reported on 3/22/2022)       atorvastatin (LIPITOR) 40 MG tablet Take 1 tablet (40 mg) by mouth daily 90 tablet 1     carboxymethylcellulose PF (REFRESH LIQUIGEL) 1 % ophthalmic gel Place 1 drop into both eyes 2 times daily as needed for dry eyes 30 each 1     ipratropium - albuterol 0.5 mg/2.5 mg/3 mL (DUONEB) 0.5-2.5 (3) MG/3ML neb solution Take 1 vial (3 mLs) by nebulization every 6 hours as needed for shortness of breath / dyspnea or wheezing 360 mL 3     polyethylene glycol (MIRALAX) 17 GM/Dose powder Take 17 g by mouth daily (Patient not taking: Reported on 3/22/2022) 510 g 0     tiotropium (SPIRIVA RESPIMAT) 2.5 MCG/ACT inhaler Inhale 2 puffs into the lungs daily 4 g 1       Social History     Tobacco Use     Smoking status: Never Smoker     Smokeless tobacco: Never Used   Substance Use Topics     Alcohol use: No          Allergies   Allergen Reactions     No Known Allergies        No results found for this or any previous visit (from the past 24 hour(s)).         Review of Systems:     10 point ROS negative except for what is  "noted in HPI          Physical Exam:     Vitals:    05/04/22 0917   BP: 122/79   BP Location: Right arm   Patient Position: Sitting   Cuff Size: Adult Regular   Pulse: 73   Resp: 16   Temp: 98.3  F (36.8  C)   TempSrc: Oral   SpO2: 96%   Weight: 64 kg (141 lb 1.9 oz)   Height: 1.486 m (4' 10.5\")     Body mass index is 28.99 kg/m .    General: Sitting comfortably. No acute distress.   HEENT: EOMI.   Respiratory: No respiratory distress. Lungs clear.  Cardiac: Warm and well perfused. Brisk cap refill.  Abdominal: Abdomen is soft and non-tender without distention.  Extremities: Upper and lower extremities grossly normal.  Skin: Large keloid spanning over right chest wall in area of bra line.  Neurological: Motor function is grossly normal. Goes from sit to stand easily.  Psychiatric: Good insight and understanding. Appropriate linear thought process.    "

## 2022-05-04 NOTE — NURSING NOTE
Due to patient being non-English speaking/uses sign language, an  was used for this visit. Only for face-to-face interpretation by an external agency, date and length of interpretation can be found on the scanned worksheet.     name: Charles Crawford  Agency: [employer]  Language: Ros   Telephone number:   Type of interpretation: Face-to-face, spoken

## 2022-05-05 ENCOUNTER — TELEPHONE (OUTPATIENT)
Dept: FAMILY MEDICINE | Facility: CLINIC | Age: 77
End: 2022-05-05
Payer: COMMERCIAL

## 2022-05-05 NOTE — PROGRESS NOTES
Preceptor Attestation:  Patient's case reviewed and discussed with Manoj Burch MD resident and I evaluated the patient. I agree with written assessment and plan of care.  Supervising Physician:  Servando Sexton MD, MD VO  [facility]

## 2022-05-05 NOTE — TELEPHONE ENCOUNTER
[facility] phone call message- patient requesting results:    Test: Lab    Date of test:     05/05/2022    Additional Comments:     Patient daughter call and advised recvied voicemail. Please call patient back regarding results please.     OK to leave a message on voice mail? Yes    Primary language: Hmong      needed? Yes    Call taken on May 5, 2022 at 3:11 PM by Maryjo Umana

## 2022-05-12 ENCOUNTER — HOSPITAL ENCOUNTER (EMERGENCY)
Facility: HOSPITAL | Age: 77
Discharge: HOME | End: 2022-05-12
Attending: EMERGENCY MEDICINE | Admitting: EMERGENCY MEDICINE
Payer: COMMERCIAL

## 2022-05-12 ENCOUNTER — APPOINTMENT (OUTPATIENT)
Dept: RADIOLOGY | Facility: HOSPITAL | Age: 77
End: 2022-05-12
Attending: EMERGENCY MEDICINE
Payer: COMMERCIAL

## 2022-05-12 ENCOUNTER — APPOINTMENT (OUTPATIENT)
Dept: CT IMAGING | Facility: HOSPITAL | Age: 77
End: 2022-05-12
Attending: EMERGENCY MEDICINE
Payer: COMMERCIAL

## 2022-05-12 VITALS
TEMPERATURE: 98.8 F | OXYGEN SATURATION: 97 % | RESPIRATION RATE: 16 BRPM | SYSTOLIC BLOOD PRESSURE: 176 MMHG | WEIGHT: 130 LBS | DIASTOLIC BLOOD PRESSURE: 85 MMHG | HEART RATE: 69 BPM | BODY MASS INDEX: 27.29 KG/M2 | HEIGHT: 58 IN

## 2022-05-12 DIAGNOSIS — S30.0XXA CONTUSION OF BUTTOCK, INITIAL ENCOUNTER: ICD-10-CM

## 2022-05-12 DIAGNOSIS — S00.03XA HEMATOMA OF SCALP, INITIAL ENCOUNTER: ICD-10-CM
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
## 2022-05-12 DIAGNOSIS — E04.1 THYROID NODULE: ICD-10-CM

## 2022-05-12 DIAGNOSIS — S60.419A ABRASION OF FINGER OF LEFT HAND, INITIAL ENCOUNTER: ICD-10-CM

## 2022-05-12 DIAGNOSIS — S00.81XA ABRASION OF FOREHEAD, INITIAL ENCOUNTER: ICD-10-CM

## 2022-05-12 PROCEDURE — 70450 CT HEAD/BRAIN W/O DYE: CPT

## 2022-05-12 PROCEDURE — 73552 X-RAY EXAM OF FEMUR 2/>: CPT | Mod: RT

## 2022-05-12 PROCEDURE — 72125 CT NECK SPINE W/O DYE: CPT

## 2022-05-12 PROCEDURE — 250N000009 HC RX 250: Performed by: EMERGENCY MEDICINE

## 2022-05-12 PROCEDURE — 71046 X-RAY EXAM CHEST 2 VIEWS: CPT

## 2022-05-12 PROCEDURE — 99285 EMERGENCY DEPT VISIT HI MDM: CPT | Mod: 25

## 2022-05-12 PROCEDURE — 70486 CT MAXILLOFACIAL W/O DYE: CPT

## 2022-05-12 PROCEDURE — 72170 X-RAY EXAM OF PELVIS: CPT

## 2022-05-12 PROCEDURE — 93005 ELECTROCARDIOGRAM TRACING: CPT | Performed by: EMERGENCY MEDICINE

## 2022-05-12 PROCEDURE — 73030 X-RAY EXAM OF SHOULDER: CPT | Mod: RT

## 2022-05-12 RX ORDER — BACITRACIN ZINC 500 [USP'U]/G
OINTMENT TOPICAL ONCE
Status: COMPLETED | OUTPATIENT
Start: 2022-05-12 | End: 2022-05-12

## 2022-05-12 RX ADMIN — BACITRACIN ZINC 0.9 G: 500 OINTMENT TOPICAL at 12:14

## 2022-05-12 NOTE — ED PROVIDER NOTES
EMERGENCY DEPARTMENT ENCOUNTER      NAME: Rhett Alvarado  AGE: 77 year old female  YOB: 1945  MRN: [id number]  EVALUATION DATE & TIME: No admission date for patient encounter.    PCP: Manoj Burch    ED PROVIDER: Renita Gaytan M.D.      Chief Complaint   Patient presents with     Fall         FINAL IMPRESSION:  1. Fall, initial encounter    2. Contusion of buttock, initial encounter    3. Abrasion of finger of left hand, initial encounter    4. Abrasion of forehead, initial encounter    5. Hematoma of scalp, initial encounter    6. Thyroid nodule          ED COURSE & MEDICAL DECISION MAKIN:32 AM I met with patient for initial interview and exam.  1:14 PM We discussed plans for discharge including supportive cares, symptomatic treatment, outpatient follow up, and reasons to return to the emergency department.     ED Course as of 22 1315   Thu May 12, 2022   0935 Pt with left hand abrasion, right buttock contusion with small hematoma, right shoulder discomfort without deformity or bruise, and right forehead contusion with hematoma and abrasion after fall. No new loose teeth on partially edentulous examination, neurovascularly intact, tetanus up to date. Will CT head and c-spine and face, XR pelvis and shoulder with unlikely fractures at that location and clinically reassess, bacitracin to wounds and dress wounds.    1312 Pt wihtout acute bony pathology or changes on XR, CT head and c-spine and maxillofacial region only incidentally positive for thyroid nodule but reassuringly no acute traumatic pathology. Patient discharged after being provided with extensive anticipatory guidance and given return precautions, importance of PMD follow-up emphasized. Patient will f/up with PMD with recent falls and thyroid nodule.       Pertinent Labs & Imaging studies reviewed. (See chart for details)    N95 worn  A face shield was worn also  COVID PPE      At the conclusion of the encounter I discussed the  results of all of the tests and the disposition. The questions were answered. The patient or family acknowledged understanding and was agreeable with the care plan.     MEDICATIONS GIVEN IN THE EMERGENCY:  Medications   bacitracin ointment (0.9 g Topical Given 5/12/22 1214)       NEW PRESCRIPTIONS STARTED AT TODAY'S ER VISIT  New Prescriptions    No medications on file          =================================================================    HPI  Family translating for patient. Patient is Hmong speaking.    Rhett Alvarado is a 77 year old female with PMHx of hypertension and COPD who presents to the ED today via walk in for evaluation of a fall.    At 8:20 AM, the patient slipped and fell down the stairs hitting her head on the concrete. Reports head pain and right buttock pain. Denies neck pain or any other complaints.    Of note, patient takes a baby ASA daily. Last Tdap 5/4/2022.      REVIEW OF SYSTEMS   All other systems reviewed and are negative except as noted above in HPI.    PAST MEDICAL HISTORY:  History reviewed. No pertinent past medical history.    PAST SURGICAL HISTORY:  Past Surgical History:   Procedure Laterality Date     IR MISCELLANEOUS PROCEDURE  2/20/2012     IR PORT REMOVAL  7/20/2012       CURRENT MEDICATIONS:    acetaminophen (TYLENOL) 325 MG tablet  albuterol (PROAIR HFA/PROVENTIL HFA/VENTOLIN HFA) 108 (90 Base) MCG/ACT inhaler  amLODIPine (NORVASC) 5 MG tablet  aspirin 81 MG EC tablet  atorvastatin (LIPITOR) 40 MG tablet  betamethasone valerate (VALISONE) 0.1 % external cream  carboxymethylcellulose PF (REFRESH LIQUIGEL) 1 % ophthalmic gel  ipratropium - albuterol 0.5 mg/2.5 mg/3 mL (DUONEB) 0.5-2.5 (3) MG/3ML neb solution  polyethylene glycol (MIRALAX) 17 GM/Dose powder  tiotropium (SPIRIVA RESPIMAT) 2.5 MCG/ACT inhaler        ALLERGIES:  Allergies   Allergen Reactions     No Known Allergies        FAMILY HISTORY:  Family History   Problem Relation Age of Onset     No Known Problems Mother   "    No Known Problems Father      No Known Problems Sister      No Known Problems Daughter      No Known Problems Maternal Grandmother      No Known Problems Maternal Grandfather      No Known Problems Paternal Grandmother      No Known Problems Paternal Grandfather      No Known Problems Maternal Aunt      No Known Problems Paternal Aunt      Hereditary Breast and Ovarian Cancer Syndrome No family hx of      Breast Cancer No family hx of      Cancer No family hx of      Colon Cancer No family hx of      Endometrial Cancer No family hx of      Ovarian Cancer No family hx of        SOCIAL HISTORY:   Social History     Socioeconomic History     Marital status: Single   Tobacco Use     Smoking status: Never Smoker     Smokeless tobacco: Never Used   Substance and Sexual Activity     Alcohol use: No       VITALS:  Patient Vitals for the past 24 hrs:   BP Temp Temp src Pulse Resp SpO2 Height Weight   05/12/22 1237 (!) 140/75 -- -- 73 -- 96 % -- --   05/12/22 1141 (!) 182/89 -- -- 69 18 97 % -- --   05/12/22 0931 (!) 176/89 98.8  F (37.1  C) Oral 83 18 96 % 1.473 m (4' 10\") 59 kg (130 lb)       PHYSICAL EXAM    VITAL SIGNS: BP (!) 140/75   Pulse 73   Temp 98.8  F (37.1  C) (Oral)   Resp 18   Ht 1.473 m (4' 10\")   Wt 59 kg (130 lb)   SpO2 96%   BMI 27.17 kg/m     GENERAL: Awake, alert.  In no acute distress. GCS 15  HEENT: Pupils equal, round and reactive.  Conjunctiva normal.  EOMI. No sellers sign, no racoon eyes, no mastoid tenderness, no hemotympanum, no facial instability, no nasal bridge pain, no nasal septal hematoma, no intraoral lacerations, no loose teeth, no mandible pain or deformity Right superior orbital area swelling. Abrasion to right forehead. Partially edentulous   NECK: No stridor or apparent deformity. No midline pain to palpation.  PULMONARY: Symmetrical breath sounds without distress.  Lungs clear to auscultation bilaterally without wheezes, rhonchi or rales.  CARDIO: Regular rate and rhythm.  " No significant murmur, rub or gallop.  Radial pulses strong and symmetrical.  THORAX: No focal chest wall deformity or crepitus  BACK: No focal tenderness or deformity to each vertebral level in midline  ABDOMINAL: Abdomen soft, non-distended and non-tender to palpation.  No CVAT, BL.  EXTREMITIES: No lower extremity swelling or edema. Pelvis stable and without focal tenderness. Bilateral pedal pulses 2+ and equal. Right buttock bruising and swelling with hematoma. Right posterior shoulder discomfort at underlying keloid scar.  NEURO: Alert and oriented to person, place and time.  Cranial nerves grossly intact.  No focal motor deficit. Sensation globally intact.  PSYCH: Normal mood and affect  SKIN: No rashes Left superficial abrasion of dorsal MCPJ.       LAB:  All pertinent labs reviewed and interpreted.  Results for orders placed or performed during the hospital encounter of 05/12/22   CT Head w/o Contrast    Impression    IMPRESSION:  1.  Right frontal scalp and periorbital soft tissue hematomas.  2.  No acute intracranial hemorrhage.   CT Cervical Spine w/o Contrast    Impression    IMPRESSION:  1.  No acute cervical spine fracture.  2.  There is a 2.5 cm low attenuating nodule in the right thyroid gland. Further evaluation with thyroid sonogram is advised.    REFERENCE:  Oliverio FERNANDEZ et al. Managing Incidental Thyroid Nodules Detected on Imaging: White Paper of the ACR Incidental Thyroid Findings Committee. JACR 2015; 12:143-150.    Incidental thyroid nodule detected on CT or MRI without suspicious findings. Applies to general population without limited life expectancy or significant comorbidities.    Age greater than or equal to 35 years  Less than 1.5 cm: No further evaluation.  Greater than or equal to 1.5 cm: Evaluate with thyroid ultrasound.   CT Facial Bones without Contrast    Impression    IMPRESSION:   1.  Right frontal scalp and periorbital soft tissue hematoma.  2.  No acute facial fracture.     XR  Shoulder Right 2 Views    Impression    IMPRESSION: Normal joint spaces and alignment. No fracture.    XR Femur Right 2 Views    Impression    IMPRESSION: Right hip and femur negative for fracture or bone lesion. Normal joint alignment with maintained joint spacing. Normal knee joint alignment with maintained joint spacing. Normal soft tissues.   XR Chest 2 Views    Impression    IMPRESSION: Increased opacity right infrahilar region consistent with chronic collapse and bronchiectasis right middle lobe as seen on prior CT. Some similar findings in the right upper lobe with partial collapse of the right upper lobe similar to prior   CT.    No acute new findings. No pneumothorax.   XR Pelvis 1/2 Views    Impression    IMPRESSION: Pelvis and hips negative for fracture or joint malalignment. No significant arthritic changes in the hips. 4 cm calcification in the right pelvis, probable uterine fibroid. Moderate degenerative changes in the lower lumbar spine.       RADIOLOGY:  Reviewed all pertinent imaging. Please see official radiology report.  XR Femur Right 2 Views   Final Result   IMPRESSION: Right hip and femur negative for fracture or bone lesion. Normal joint alignment with maintained joint spacing. Normal knee joint alignment with maintained joint spacing. Normal soft tissues.      XR Chest 2 Views   Final Result   IMPRESSION: Increased opacity right infrahilar region consistent with chronic collapse and bronchiectasis right middle lobe as seen on prior CT. Some similar findings in the right upper lobe with partial collapse of the right upper lobe similar to prior    CT.      No acute new findings. No pneumothorax.      XR Shoulder Right 2 Views   Final Result   IMPRESSION: Normal joint spaces and alignment. No fracture.       XR Pelvis 1/2 Views   Final Result   IMPRESSION: Pelvis and hips negative for fracture or joint malalignment. No significant arthritic changes in the hips. 4 cm calcification in the right  pelvis, probable uterine fibroid. Moderate degenerative changes in the lower lumbar spine.      CT Facial Bones without Contrast   Final Result   IMPRESSION:    1.  Right frontal scalp and periorbital soft tissue hematoma.   2.  No acute facial fracture.         CT Cervical Spine w/o Contrast   Final Result   IMPRESSION:   1.  No acute cervical spine fracture.   2.  There is a 2.5 cm low attenuating nodule in the right thyroid gland. Further evaluation with thyroid sonogram is advised.      REFERENCE:   Oliverio OCAMPOK et al. Managing Incidental Thyroid Nodules Detected on Imaging: White Paper of the ACR Incidental Thyroid Findings Committee. JACR 2015; 12:143-150.      Incidental thyroid nodule detected on CT or MRI without suspicious findings. Applies to general population without limited life expectancy or significant comorbidities.      Age greater than or equal to 35 years   Less than 1.5 cm: No further evaluation.   Greater than or equal to 1.5 cm: Evaluate with thyroid ultrasound.      CT Head w/o Contrast   Final Result   IMPRESSION:   1.  Right frontal scalp and periorbital soft tissue hematomas.   2.  No acute intracranial hemorrhage.            EKG:    Reviewed and interpreted as: Performed at 10:46. Normal sinus rhythm at 73 bpm. Normal ECG. When compared to 2/9/2022, no significant change was found.      I have independently reviewed and interpreted the EKG(s) documented above.        I, Gloria Rayo, am serving as a scribe to document services personally performed by Dr. Renita Gaytan based on my observation and the provider's statements to me. IRenita MD attest that Gloria Rayo is acting in a scribe capacity, has observed my performance of the services and has documented them in accordance with my direction.       Renita Gaytan MD  05/12/22 [id number]

## 2022-05-12 NOTE — ED TRIAGE NOTES
Patient fell outside going down the stairs, fell down around 4 steps, hitting her forehead. Unsure if she is on  thinners.  Also his her right hip area. Kp any LOC before or after the fall. MD evaluating said to call trama alert.

## 2022-05-12 NOTE — DISCHARGE INSTRUCTIONS
Your THYROID GLAND has a NODULE. This is something your primary care doctor should know about so they can follow up with you about it to make sure it's safe.

## 2022-05-13 ENCOUNTER — TELEPHONE (OUTPATIENT)
Dept: FAMILY MEDICINE | Facility: CLINIC | Age: 77
End: 2022-05-13
Payer: COMMERCIAL

## 2022-05-13 LAB
ATRIAL RATE - MUSE: 73 BPM
DIASTOLIC BLOOD PRESSURE - MUSE: NORMAL MMHG
INTERPRETATION ECG - MUSE: NORMAL
P AXIS - MUSE: 68 DEGREES
PR INTERVAL - MUSE: 138 MS
QRS DURATION - MUSE: 84 MS
QT - MUSE: 392 MS
QTC - MUSE: 431 MS
R AXIS - MUSE: 49 DEGREES
SYSTOLIC BLOOD PRESSURE - MUSE: NORMAL MMHG
T AXIS - MUSE: 67 DEGREES
VENTRICULAR RATE- MUSE: 73 BPM

## 2022-05-13 NOTE — TELEPHONE ENCOUNTER
[facility] phone call message - order or referral request for patient:     Order or referral being requested:     DME Order    Cane, pull ups, wipes, pads ensure drinks      Additional Comments:     Tmi placing DME order. Per Tim would like Cane, pull ups, wipes and pads to go to [facility], Fax # [phone]  Ensure drinks go to [employer] Fax # [phone].    For all order please put down tim's name and phone number before faxing it over, in case if they have any questions or concern they can contact him, please and thank you.     OK to leave a message on voice mail? Yes    Primary language: ong      needed? Yes    Call taken on May 13, 2022 at 11:02 AM by Maryjo Umana

## 2022-05-16 ENCOUNTER — OFFICE VISIT (OUTPATIENT)
Dept: FAMILY MEDICINE | Facility: CLINIC | Age: 77
End: 2022-05-16
Payer: COMMERCIAL

## 2022-05-16 VITALS
WEIGHT: 142 LBS | SYSTOLIC BLOOD PRESSURE: 135 MMHG | HEART RATE: 66 BPM | TEMPERATURE: 97.4 F | OXYGEN SATURATION: 97 % | DIASTOLIC BLOOD PRESSURE: 84 MMHG | RESPIRATION RATE: 18 BRPM | HEIGHT: 58 IN | BODY MASS INDEX: 29.81 KG/M2

## 2022-05-16 DIAGNOSIS — S09.90XD CLOSED HEAD INJURY, SUBSEQUENT ENCOUNTER: ICD-10-CM

## 2022-05-16 DIAGNOSIS — S30.0XXD CONTUSION, BUTTOCK, SUBSEQUENT ENCOUNTER: ICD-10-CM

## 2022-05-16 DIAGNOSIS — S60.222D CONTUSION OF LEFT HAND, SUBSEQUENT ENCOUNTER: ICD-10-CM

## 2022-05-16 DIAGNOSIS — E04.1 THYROID NODULE: Primary | ICD-10-CM

## 2022-05-16 PROCEDURE — 99214 OFFICE O/P EST MOD 30 MIN: CPT | Performed by: FAMILY MEDICINE

## 2022-05-16 RX ORDER — OXYCODONE HYDROCHLORIDE 5 MG/1
5 TABLET ORAL EVERY 6 HOURS PRN
Qty: 10 TABLET | Refills: 0 | Status: SHIPPED | OUTPATIENT
Start: 2022-05-16 | End: 2022-05-19

## 2022-05-16 NOTE — PROGRESS NOTES
HPI:   Rhett Alvarado is a 77 year old  female who presents for:    Chief Complaint   Patient presents with     Follow Up     ED follow up form fall- pt reports no medicine given at ED and is still in lots of pain       A [employer]  was used for this visit      On 5/12/22 slipped and fell on 4 concrete stairs at her home.  Remembers losing her footing and the entire fall.  Fell initially onto right buttock, then rolled onto right forehead, face, right upper arm, and left hand.    Now has continued pain at right buttock, worse when sitting or lying on right side. Finding it difficult to sleep due to right buttock pain.  Able to stand and walk, some minimal pain with standing/walking, but pain better walking then lying/sitting.    Pain at dorsum of left hand improved compared to time of injury.  Able to use hand. Some pain at distal thumb and index finger with grasping with thumb and index finger.    ED record from 5/12/2022 was reviewed.  Head CT and C-spine CT were negative, other than the incidental thyroid nodule.  X-rays of the right femur were negative  X-ray of the pelvis and hips was negative  X-ray of the right shoulder was negative  X-ray of the chest  showed right sided opacity consistent with bronchiectasis of the right middle lobe and right upper lobe consistent with a previous CT    She was discharged from the emergency department with instructions to follow-up with a primary doctor.  Tylenol was recommended for pain.  Her abrasions were treated with bacitracin ointment.  She would like something stronger for pain.             PMHX:     Patient Active Problem List   Diagnosis     Fatigue     Hyponatremia     Acute Lymphoma     Keloid scar     Abdominal Pain     Benign Essential Hypertension     Palpitations     Chronic Obstructive Pulmonary Disease     Wheezing (Symptom)     Constipation     Dermatitis     Lung mass     Muscle weakness (generalized)     Pneumonia     Prolapse of vaginal wall  with midline cystocele     COPD exacerbation (H)     E. coli UTI     Hypertensive disorder     Hyponatremia     Malignant lymphoma, large cell, diffuse (H)     Prediabetes     Tuberculosis       Current Outpatient Medications   Medication Sig Dispense Refill     acetaminophen (TYLENOL) 325 MG tablet Take 650 mg by mouth every 6 hours as needed       albuterol (PROAIR HFA/PROVENTIL HFA/VENTOLIN HFA) 108 (90 Base) MCG/ACT inhaler Inhale 2 puffs into the lungs every 6 hours as needed for shortness of breath / dyspnea or wheezing 18 g 3     amLODIPine (NORVASC) 5 MG tablet Take 1 tablet (5 mg) by mouth daily 90 tablet 1     aspirin 81 MG EC tablet Take 81 mg by mouth daily       atorvastatin (LIPITOR) 40 MG tablet Take 1 tablet (40 mg) by mouth daily 90 tablet 1     augmented betamethasone dipropionate (DIPROLENE AF) 0.05 % external cream Apply topically 2 times daily To thickened areas of scars 50 g 0     betamethasone valerate (VALISONE) 0.1 % external cream Apply topically 2 times daily 45 g 1     carboxymethylcellulose PF (REFRESH LIQUIGEL) 1 % ophthalmic gel Place 1 drop into both eyes 2 times daily as needed for dry eyes 30 each 1     ipratropium - albuterol 0.5 mg/2.5 mg/3 mL (DUONEB) 0.5-2.5 (3) MG/3ML neb solution Take 1 vial (3 mLs) by nebulization every 6 hours as needed for shortness of breath / dyspnea or wheezing 360 mL 3     polyethylene glycol (MIRALAX) 17 GM/Dose powder Take 17 g by mouth daily 510 g 0     tiotropium (SPIRIVA RESPIMAT) 2.5 MCG/ACT inhaler Inhale 2 puffs into the lungs daily 4 g 1       Social History     Tobacco Use     Smoking status: Never Smoker     Smokeless tobacco: Never Used   Substance Use Topics     Alcohol use: No       Social History     Social History Narrative     Not on file       Allergies   Allergen Reactions     No Known Allergies        No results found for this or any previous visit (from the past 24 hour(s)).         Review of Systems:     General: No fevers or  "chills  ENT: No upper respiratory symptoms.  COVID screening questions are negative.  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  Cardiovascular: No chest pain  Respiratory: No cough or shortness of breath  Skin: Has keloids           Physical Exam:     Vitals:    05/16/22 1030 05/16/22 1032   BP: (!) 145/87 135/84   Pulse: 66    Resp: 18    Temp: 97.4  F (36.3  C)    SpO2: 97%    Weight: 64.4 kg (142 lb)    Height: 1.473 m (4' 10\")      Body mass index is 29.68 kg/m .    General: Alert,  in no acute distress  HEENT:Purple/pink bruising around right eye and forehead.  Mild soft tissue swelling at the right forehead.  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   Moist oral mucus membranes, no tonsilar hypertrophy or exudate. TM's white with normal bony and light landmarks.  Neck: no midline tenderness  Resp: Clear to auscultation bilaterally  CV: Regular rate and rhythm  Abd: Soft, non-tender.  Ext: Warm.  No tenderness along the fingers or thumb.  Intact range of motion of both hands and wrists.  No tenderness in the snuffbox bilaterally.  Skin: 1cm shallow abrasion right forehead. No surrounding erythema.    1cm skin abrasion left thumb.  Bruising along dorsum of left hand.    18cm x 10cm bruise right buttock    7cm X 6cm bruise right bicep          Assessment and Plan   1. Thyroid nodule  Incidental thyroid nodule meets criteria for thyroid ultrasound for further evaluation  Thyroid ultrasound was ordered  Plan for biopsy if indicated  - US Thyroid; Future  - Adult Endocrinology  Referral; Future    2. Contusion, buttock, subsequent encounter  Negative x-ray reports from recent emergency department visit reviewed  Anticipate improvement in pain over the next 3 to 4 weeks  After discussion about the risks of opioid treatment, patient and the family elected a short course of oxycodone to help with pain particularly at night when trying to rest.  Discussed the risk of " sedation, falls, dependence, constipation, and plan for only a short course.  Recommended Colace 100 mg daily as a stool softener while on oxycodone.    See after visit summary for instructions.    She may continue to use Tylenol 650 mg up to 3 times times daily as needed    - oxyCODONE (ROXICODONE) 5 MG tablet; Take 1 tablet (5 mg) by mouth every 6 hours as needed for severe pain  Dispense: 10 tablet; Refill: 0    3. Closed head injury, subsequent encounter  Recent head CT negative  Anticipate resolution of pain as her scalp contusions improve over the next 3 weeks  She should seek emergency care right away if she develops any increasing headache, confusion, difficulty thinking, or strength problems    - oxyCODONE (ROXICODONE) 5 MG tablet; Take 1 tablet (5 mg) by mouth every 6 hours as needed for severe pain  Dispense: 10 tablet; Refill: 0    4. Contusion of left hand, subsequent encounter  No sign of fracture  Anticipate resolution of pain over the next 3 weeks  Return for reevaluation if pain persists.    - oxyCODONE (ROXICODONE) 5 MG tablet; Take 1 tablet (5 mg) by mouth every 6 hours as needed for severe pain  Dispense: 10 tablet; Refill: 0      Follow up: 2 weeks  Options for treatment and follow-up care were reviewed with the patient and/or guardian. Rhett Alvarado and/or guardian engaged in the decision making process and verbalized understanding of the options discussed and agreed with the final plan.    Jose Luis Vences MD  Faculty - [facility]

## 2022-05-16 NOTE — NURSING NOTE
Due to patient being non-English speaking/uses sign language, an  was used for this visit. Only for face-to-face interpretation by an external agency, date and length of interpretation can be found on the scanned worksheet.     name: Aileen   Agency: [employer]  Language: Charlieong   Telephone number: [phone]  Type of interpretation: Telephone, spoken

## 2022-05-17 ENCOUNTER — PATIENT OUTREACH (OUTPATIENT)
Dept: FAMILY MEDICINE | Facility: CLINIC | Age: 77
End: 2022-05-17
Payer: COMMERCIAL

## 2022-05-17 NOTE — PROGRESS NOTES
Clinic Care Coordination Contact    Follow Up Progress Note      Assessment: The pt was recently in the ED, I called to check up on the pt, and help the pt setup a ED follow up. The pt was at [facility] for a fall. I called and talked to the pt daughter, she stated that the pt is doing better. She stated that the pt was seen yesterday in clinic by .     Care Gaps:    Health Maintenance Due   Topic Date Due     DEXA  Never done     ADVANCE CARE PLANNING  Never done     COPD ACTION PLAN  Never done     ZOSTER IMMUNIZATION (1 of 2) Never done     MEDICARE ANNUAL WELLNESS VISIT  Never done     FALL RISK ASSESSMENT  Never done     Pneumococcal Vaccine: 65+ Years (2 - PCV) 10/01/2011           Goals addressed this encounter:    Goals Addressed    None         Intervention/Education provided during outreach:               Plan:     Care Coordinator will follow up in month

## 2022-05-19 DIAGNOSIS — R54 FRAIL ELDERLY: ICD-10-CM

## 2022-05-19 DIAGNOSIS — E63.9 NUTRITION DISORDER: Primary | ICD-10-CM
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
## 2022-05-19 NOTE — TELEPHONE ENCOUNTER
DME orders faxed.  Fax # [phone]: Wipes, cane, incontinence supplies  Ensure drinks Active style Fax # [phone].

## 2022-05-19 NOTE — PROGRESS NOTES
DME (Durable Medical Equipment) Orders and Documentation  Orders Placed This Encounter   Procedures     Incontinence Supplies Order     Cane Order     Miscellaneous DME Order     Miscellaneous Order for DME - ONLY FOR DME      The patient was assessed and it was determined the patient is in need of the following listed DME Supplies/Equipment. Please complete supporting documentation below to demonstrate medical necessity.      Incontinence Supplies Documentation  Incontinence supplies are needed due to incontinence and difficulty getting to bathroom.    DME All Other Item(s) Documentation    List reason for need and supporting documentation for medical necessity below for each DME item.     1. Cane for frail with immobility issues.  2. Wipes and pull ups/pads for issues with incontinence.  3. Ensures for difficulties with nutrition and frail elderly.

## 2022-05-27 ENCOUNTER — OFFICE VISIT (OUTPATIENT)
Dept: DERMATOLOGY | Facility: CLINIC | Age: 77
End: 2022-05-27
Payer: COMMERCIAL

## 2022-05-27 VITALS — DIASTOLIC BLOOD PRESSURE: 88 MMHG | OXYGEN SATURATION: 97 % | HEART RATE: 71 BPM | SYSTOLIC BLOOD PRESSURE: 136 MMHG

## 2022-05-27 DIAGNOSIS — L91.0 KELOID SCAR: ICD-10-CM

## 2022-05-27 PROCEDURE — 99203 OFFICE O/P NEW LOW 30 MIN: CPT | Mod: 25 | Performed by: NURSE PRACTITIONER

## 2022-05-27 PROCEDURE — 11901 INJECT SKIN LESIONS >7: CPT | Performed by: NURSE PRACTITIONER

## 2022-05-27 RX ORDER — BETAMETHASONE DIPROPIONATE 0.5 MG/G
CREAM TOPICAL 2 TIMES DAILY
Qty: 50 G | Refills: 0 | Status: SHIPPED | OUTPATIENT
Start: 2022-05-27 | End: 2023-07-04

## 2022-05-27 RX ORDER — TRIAMCINOLONE ACETONIDE 40 MG/ML
40 INJECTION, SUSPENSION INTRA-ARTICULAR; INTRAMUSCULAR ONCE
Status: COMPLETED | OUTPATIENT
Start: 2022-05-27 | End: 2022-05-27

## 2022-05-27 RX ADMIN — TRIAMCINOLONE ACETONIDE 40 MG: 40 INJECTION, SUSPENSION INTRA-ARTICULAR; INTRAMUSCULAR at 15:02

## 2022-05-27 ASSESSMENT — PAIN SCALES - GENERAL: PAINLEVEL: MODERATE PAIN (4)

## 2022-05-27 NOTE — LETTER
5/27/2022         RE: Rhett Alvarado  [address] [facility] [address]        Dear Colleague,    Thank you for referring your patient, Rhett Alvarado, to the [facility]. Please see a copy of my visit note below.    [facility] Dermatology Note  Encounter Date: May 27, 2022  Office Visit     Reviewed patients past medical history and pertinent chart review prior to patients visit today.     Dermatology Problem List:  Keloid scarring,     ____________________________________________    Assessment & Plan:     # Keloid scars. Benign. Discussed that we can try intralesional kenalog injections in attempts to thin out the scar which can help flatten and soften the scar. This can help decrease the symptoms of itching, tightness and pain. Discussed that it can take multiple injections to see adequate improvement and that I usually inject kenalog in 1 month intervals until symptoms resolve then stop. Patient would like to proceed with injecitons.     -Kenalog intralesional injection procedure note: After verbal consent and discussion of risks including but not limited to atrophy, pain, and bruising, time out was performed, 3 total cc of Kenalog 40 mg/cc was injected into 4 site(s) on all bilateral flanks and bilateral posterior shoulders only in the thickest areas of scars.  The patient tolerated the procedure well and left the Dermatology clinic in good condition.       -Start betamethasone augmented formula  0.05% ointment to be applied only to thickened hypertrophic areas of skin twice daily until follow-up visit in 1 month.    -Referral to Dr. Dalton for consultation for further treatment if injections are not effective.  We have scheduled her with Dr. Dalton in September which is his first available and I will continue to see patient monthly for injections if they are helpful until that visit.    Arlyn Joiner, JUDE CNP on 5/27/2022 at 2:50 PM    _______________________________________    CC: keloid scars  HPI:  Ms. Rhett Alvarado is a(n) 77 year old female who presents today as a new patient for hypertrophic keloid scarring on the trunk.  I received outside records from 2016 and 2018 when she has had Kenalog injections to these lesions.  It sounds like she had this injected about 1 month ago as well at an outside clinic but I do not have those records.  Patient says that she is having a lot of pain, discomfort, and itching.  It keeps her up at night sometimes.  She would like to have treatment of these.  She says that they started because of her bra rubbing on her skin so these are under both arms on the flanks where her underwire would lay and on both posterior shoulders where the bra strap lays.  She is here with her daughter today who is helping interpret when needed.    Patient is otherwise feeling well, without additional skin concerns.      Physical Exam:  Vitals: /88   Pulse 71   SpO2 97%   SKIN: Waist-up skin, which includes the head/face, neck, both arms, chest, back, abdomen, digits and/or nails was examined.  -4 very large irregular shaped thick hyperkeratotic firm pink scars on the bilateral shoulders and flanks extending under the breasts.  Please see photographs for sizes.     - No other lesions of concern on areas examined.     Medications:  Current Outpatient Medications   Medication     acetaminophen (TYLENOL) 325 MG tablet     albuterol (PROAIR HFA/PROVENTIL HFA/VENTOLIN HFA) 108 (90 Base) MCG/ACT inhaler     amLODIPine (NORVASC) 5 MG tablet     aspirin 81 MG EC tablet     atorvastatin (LIPITOR) 40 MG tablet     betamethasone valerate (VALISONE) 0.1 % external cream     carboxymethylcellulose PF (REFRESH LIQUIGEL) 1 % ophthalmic gel     ipratropium - albuterol 0.5 mg/2.5 mg/3 mL (DUONEB) 0.5-2.5 (3) MG/3ML neb solution     polyethylene glycol (MIRALAX) 17 GM/Dose powder     tiotropium (SPIRIVA RESPIMAT) 2.5 MCG/ACT inhaler     Current  Facility-Administered Medications   Medication     triamcinolone (KENALOG-40) injection 40 mg      Past Medical History:   Patient Active Problem List   Diagnosis     Fatigue     Hyponatremia     Acute Lymphoma     Keloid scar     Abdominal Pain     Benign Essential Hypertension     Palpitations     Chronic Obstructive Pulmonary Disease     Wheezing (Symptom)     Constipation     Dermatitis     Lung mass     Muscle weakness (generalized)     Pneumonia     Prolapse of vaginal wall with midline cystocele     COPD exacerbation (H)     E. coli UTI     Hypertensive disorder     Hyponatremia     Malignant lymphoma, large cell, diffuse (H)     Prediabetes     Tuberculosis     No past medical history on file.                    CC Matthew Diop MD  [address] [place] [address] on close of this encounter.         Again, thank you for allowing me to participate in the care of your patient.        Sincerely,        JUDE Hernandez CNP

## 2022-05-27 NOTE — PROGRESS NOTES
[facility] Dermatology Note  Encounter Date: May 27, 2022  Office Visit     Reviewed patients past medical history and pertinent chart review prior to patients visit today.     Dermatology Problem List:  Keloid scarring,     ____________________________________________    Assessment & Plan:     # Keloid scars. Benign. Discussed that we can try intralesional kenalog injections in attempts to thin out the scar which can help flatten and soften the scar. This can help decrease the symptoms of itching, tightness and pain. Discussed that it can take multiple injections to see adequate improvement and that I usually inject kenalog in 1 month intervals until symptoms resolve then stop. Patient would like to proceed with injecitons.     -Kenalog intralesional injection procedure note: After verbal consent and discussion of risks including but not limited to atrophy, pain, and bruising, time out was performed, 3 total cc of Kenalog 40 mg/cc was injected into 4 site(s) on all bilateral flanks and bilateral posterior shoulders only in the thickest areas of scars.  The patient tolerated the procedure well and left the Dermatology clinic in good condition.       -Start betamethasone augmented formula  0.05% ointment to be applied only to thickened hypertrophic areas of skin twice daily until follow-up visit in 1 month.    -Referral to Dr. Dalton for consultation for further treatment if injections are not effective.  We have scheduled her with Dr. Dalton in September which is his first available and I will continue to see patient monthly for injections if they are helpful until that visit.    Arlyn Joiner, APRN CNP on 5/27/2022 at 2:50 PM   _______________________________________    CC: keloid scars  HPI:  Ms. Rhett Alvarado is a(n) 77 year old female who presents today as a new patient for hypertrophic keloid scarring on the trunk.  I received outside records from 2016 and 2018 when she has had Kenalog  injections to these lesions.  It sounds like she had this injected about 1 month ago as well at an outside clinic but I do not have those records.  Patient says that she is having a lot of pain, discomfort, and itching.  It keeps her up at night sometimes.  She would like to have treatment of these.  She says that they started because of her bra rubbing on her skin so these are under both arms on the flanks where her underwire would lay and on both posterior shoulders where the bra strap lays.  She is here with her daughter today who is helping interpret when needed.    Patient is otherwise feeling well, without additional skin concerns.      Physical Exam:  Vitals: /88   Pulse 71   SpO2 97%   SKIN: Waist-up skin, which includes the head/face, neck, both arms, chest, back, abdomen, digits and/or nails was examined.  -4 very large irregular shaped thick hyperkeratotic firm pink scars on the bilateral shoulders and flanks extending under the breasts.  Please see photographs for sizes.     - No other lesions of concern on areas examined.     Medications:  Current Outpatient Medications   Medication     acetaminophen (TYLENOL) 325 MG tablet     albuterol (PROAIR HFA/PROVENTIL HFA/VENTOLIN HFA) 108 (90 Base) MCG/ACT inhaler     amLODIPine (NORVASC) 5 MG tablet     aspirin 81 MG EC tablet     atorvastatin (LIPITOR) 40 MG tablet     betamethasone valerate (VALISONE) 0.1 % external cream     carboxymethylcellulose PF (REFRESH LIQUIGEL) 1 % ophthalmic gel     ipratropium - albuterol 0.5 mg/2.5 mg/3 mL (DUONEB) 0.5-2.5 (3) MG/3ML neb solution     polyethylene glycol (MIRALAX) 17 GM/Dose powder     tiotropium (SPIRIVA RESPIMAT) 2.5 MCG/ACT inhaler     Current Facility-Administered Medications   Medication     triamcinolone (KENALOG-40) injection 40 mg      Past Medical History:   Patient Active Problem List   Diagnosis     Fatigue     Hyponatremia     Acute Lymphoma     Keloid scar     Abdominal Pain     Benign  Essential Hypertension     Palpitations     Chronic Obstructive Pulmonary Disease     Wheezing (Symptom)     Constipation     Dermatitis     Lung mass     Muscle weakness (generalized)     Pneumonia     Prolapse of vaginal wall with midline cystocele     COPD exacerbation (H)     E. coli UTI     Hypertensive disorder     Hyponatremia     Malignant lymphoma, large cell, diffuse (H)     Prediabetes     Tuberculosis     No past medical history on file.                    CC Matthew Diop MD  [address] [place] [address] on close of this encounter.

## 2022-06-02 ENCOUNTER — ANCILLARY PROCEDURE (OUTPATIENT)
Dept: ULTRASOUND IMAGING | Facility: CLINIC | Age: 77
End: 2022-06-02
Attending: FAMILY MEDICINE
Payer: COMMERCIAL

## 2022-06-02 ENCOUNTER — OFFICE VISIT (OUTPATIENT)
Dept: FAMILY MEDICINE | Facility: CLINIC | Age: 77
End: 2022-06-02
Payer: COMMERCIAL

## 2022-06-02 VITALS
DIASTOLIC BLOOD PRESSURE: 82 MMHG | SYSTOLIC BLOOD PRESSURE: 130 MMHG | HEART RATE: 67 BPM | OXYGEN SATURATION: 95 % | RESPIRATION RATE: 22 BRPM | TEMPERATURE: 98.1 F

## 2022-06-02 DIAGNOSIS — Z00.00 ENCOUNTER FOR PREVENTIVE CARE: ICD-10-CM

## 2022-06-02 DIAGNOSIS — J44.9 CHRONIC OBSTRUCTIVE PULMONARY DISEASE, UNSPECIFIED COPD TYPE (H): ICD-10-CM

## 2022-06-02 DIAGNOSIS — N18.2 CKD (CHRONIC KIDNEY DISEASE) STAGE 2, GFR 60-89 ML/MIN: ICD-10-CM

## 2022-06-02 DIAGNOSIS — R73.03 PREDIABETES: ICD-10-CM

## 2022-06-02 DIAGNOSIS — E04.1 THYROID NODULE: ICD-10-CM

## 2022-06-02 DIAGNOSIS — I10 ESSENTIAL HYPERTENSION, BENIGN: ICD-10-CM
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
## 2022-06-02 DIAGNOSIS — L91.0 KELOID SCAR: ICD-10-CM

## 2022-06-02 PROCEDURE — 99214 OFFICE O/P EST MOD 30 MIN: CPT | Mod: GC | Performed by: STUDENT IN AN ORGANIZED HEALTH CARE EDUCATION/TRAINING PROGRAM

## 2022-06-02 PROCEDURE — 76536 US EXAM OF HEAD AND NECK: CPT | Mod: TC | Performed by: RADIOLOGY

## 2022-06-02 RX ORDER — NAPROXEN 250 MG/1
250 TABLET ORAL 2 TIMES DAILY WITH MEALS
Qty: 10 TABLET | Refills: 0 | Status: SHIPPED | OUTPATIENT
Start: 2022-06-02 | End: 2022-06-07

## 2022-06-02 RX ORDER — ALBUTEROL SULFATE 90 UG/1
2 AEROSOL, METERED RESPIRATORY (INHALATION) EVERY 6 HOURS PRN
Qty: 18 G | Refills: 3 | Status: SHIPPED | OUTPATIENT
Start: 2022-06-02 | End: 2022-07-07

## 2022-06-02 NOTE — NURSING NOTE
Due to patient being non-English speaking/uses sign language, an  was used for this visit. Only for face-to-face interpretation by an external agency, date and length of interpretation can be found on the scanned worksheet.     name: Henny Crawford  Agency: [employer]  Language: Ros   Telephone number: .  Type of interpretation: Face-to-face, spoken

## 2022-06-02 NOTE — NURSING NOTE
Due to patient being non-English speaking/uses sign language, an  was used for this visit. Only for face-to-face interpretation by an external agency, date and length of interpretation can be found on the scanned worksheet.     name: Иван  Agency: [employer]  Language: Ros   Telephone number: [phone]  Type of interpretation: Face-to-face, spoken

## 2022-06-02 NOTE — PROGRESS NOTES
Preceptor Attestation:   Patient seen, evaluated and discussed with the resident. I have verified the content of the note, which accurately reflects my assessment of the patient and the plan of care.    Supervising Physician:Jacquelyn Thomas MD    [facility]

## 2022-06-02 NOTE — PROGRESS NOTES
Assessment and Plan     (W19.XXXD) Fall, subsequent encounter  (primary encounter diagnosis)  Comment: Tripped and fell 5/12 and assessed in ED with relatively unremarkable findings. Pain overall improving and physical exam unremarkable except for scattered healing bruises over right side. Vision appears to be the primary issue leading to fall; patient has appt with eye doctor next month. Not interested in PT at this time given weakness and imbalance aren't primary issues- but could consider in future.  Plan:   -naproxen (NAPROSYN) 250 MG tablet BID for x5 days  -Counseled on home safety  -DME cane ordered prior    (J44.9) Chronic obstructive pulmonary disease, unspecified COPD type (H)  Comment: Denies any exacerbations since starting new inhaler regimen. Taking spiriva respimat 2 puffs nightly and then uses albuterol prn, using only 1-2x a day. Uses duoneb only every few days when she feels herself wheezing.  Plan:   -Continue spiriva respimat 2 puffs nightly  -Refilled prn albuterol   -Continue prn duonebs    (Z00.00) Encounter for preventive care  Comment: Needs wellness visit.  Plan:   -Follow-up in 1-2 months for medicare wellness    (L91.0) Keloid scar  Comment: Large keloids over chest wall, particularly over right bra line, that developed over last few years. Main complaint is the itchiness. Has been following with derm who has been injecting with kenalog and started patient on stronger steroid cream with she finds is helping with the itchiness.  Plan:   -Follow-up with derm  -Continue steroid cream BID    (E04.1) Thyroid nodule  Comment: 2.5cm nodule of right thyroid gland appreciated incidentally on neck imaging s/p fall. Asymptomatic. Awaiting thyroid ultrasound. No family hx of thyroid cancer.  Plan:   -Thyroid ultrasound    (N18.2) CKD (chronic kidney disease) stage 2, GFR 60-89 ml/min  Comment: GFR 77 at recent lab check. Not on ACE/ARB. Not regularly taking NSAIDs.   Plan:   -Risk management as  "below  -Consider BMP at medicare wellness visit    (E78.5) Hyperlipidemia, unspecified hyperlipidemia type  Comment: Restarted on atorvastatin 40mg at visit in April as she had prior stopped taking because she didn't know if she was supposed to continue or not.   Plan:  -Repeat lipid profile in 2 months at medicare wellness visit  -Continue atorvastatin 40mg daily     (I10) Essential hypertension, benign  Comment: Has been on amlodipine 5mg daily. Not checking BP at home. BP good today at 130/82.  Plan:   -Continue amLODIPine (NORVASC) 5mg daily     (R73.03) Prediabetes  Comment: Had A1c (6.2) recently; was unchanged from prior. Not on any medications.  Plan:   -Consider repeat A1c at medicare wellness visit              Options for treatment and follow-up care were reviewed with the patient and/or guardian. Rhett Alvarado and/or guardian engaged in the decision making process and verbalized understanding of the options discussed and agreed with the final plan.    Manoj Burch MD      Precepted today with: Dr Thomas           HPI:       Rhett Alvarado is a 77 year old  female with pertinent hx of COPD, HTN, HLD, pre-diabetes who presents for \"fall follow-up\":    Had a fall on 5/12. Was seen in ED for this.   Fell on right side.   Head and right arm feeling a bit better, but right hip still fairly sore.  Been using hot packs and prn tylenol which have been helping.     Balance feels unstable at times. Waiting for her DME cane to arrive.    Got thyroid ultrasound today, awaiting results.    Following with dermatology for kenalog injections of her large keloids. Feels like it has helped.     At ED from cervical imaging:  There is a 2.5 cm low attenuating nodule in the right thyroid gland. Further evaluation with thyroid sonogram is advised.    A [employer]  was used for this visit         PMHX:     Patient Active Problem List   Diagnosis     Fatigue     Hyponatremia     Acute Lymphoma     Keloid scar     Abdominal Pain "     Benign Essential Hypertension     Palpitations     Chronic Obstructive Pulmonary Disease     Wheezing (Symptom)     Constipation     Dermatitis     Lung mass     Muscle weakness (generalized)     Pneumonia     Prolapse of vaginal wall with midline cystocele     COPD exacerbation (H)     E. coli UTI     Hypertensive disorder     Hyponatremia     Malignant lymphoma, large cell, diffuse (H)     Prediabetes     Tuberculosis       Current Outpatient Medications   Medication Sig Dispense Refill     albuterol (PROAIR HFA/PROVENTIL HFA/VENTOLIN HFA) 108 (90 Base) MCG/ACT inhaler Inhale 2 puffs into the lungs every 6 hours as needed for shortness of breath / dyspnea or wheezing 18 g 3     naproxen (NAPROSYN) 250 MG tablet Take 1 tablet (250 mg) by mouth 2 times daily (with meals) for 5 days 10 tablet 0     acetaminophen (TYLENOL) 325 MG tablet Take 650 mg by mouth every 6 hours as needed       amLODIPine (NORVASC) 5 MG tablet Take 1 tablet (5 mg) by mouth daily 90 tablet 1     aspirin 81 MG EC tablet Take 81 mg by mouth daily       atorvastatin (LIPITOR) 40 MG tablet Take 1 tablet (40 mg) by mouth daily 90 tablet 1     augmented betamethasone dipropionate (DIPROLENE AF) 0.05 % external cream Apply topically 2 times daily To thickened areas of scars 50 g 0     betamethasone valerate (VALISONE) 0.1 % external cream Apply topically 2 times daily 45 g 1     carboxymethylcellulose PF (REFRESH LIQUIGEL) 1 % ophthalmic gel Place 1 drop into both eyes 2 times daily as needed for dry eyes 30 each 1     ipratropium - albuterol 0.5 mg/2.5 mg/3 mL (DUONEB) 0.5-2.5 (3) MG/3ML neb solution Take 1 vial (3 mLs) by nebulization every 6 hours as needed for shortness of breath / dyspnea or wheezing 360 mL 3     polyethylene glycol (MIRALAX) 17 GM/Dose powder Take 17 g by mouth daily 510 g 0     tiotropium (SPIRIVA RESPIMAT) 2.5 MCG/ACT inhaler Inhale 2 puffs into the lungs daily 4 g 1       Social History     Tobacco Use     Smoking  status: Never Smoker     Smokeless tobacco: Never Used   Substance Use Topics     Alcohol use: No          Allergies   Allergen Reactions     No Known Allergies        No results found for this or any previous visit (from the past 24 hour(s)).         Review of Systems:     10 point ROS negative except for what is noted in HPI          Physical Exam:     Vitals:    06/02/22 1456   BP: 130/82   Pulse: 67   Resp: 22   Temp: 98.1  F (36.7  C)   TempSrc: Oral   SpO2: 95%     There is no height or weight on file to calculate BMI.    General: Sitting comfortably. No acute distress.   HEENT: Conjunctivae are clear, nonicteric.   Neck: No masses appreciated.  Respiratory: No respiratory distress. Lung sounds clear.  Cardiac: RRR. Brisk cap refill. Warm and well perfused.  Abdominal: Abdomen is soft and non-tender without distention.  Extremities: Upper and lower extremities grossly normal.  Skin: Skin in warm without rashes. Scattered matured bruises over superior buttocks, right upper arm, right face.  Neurological: Motor function is grossly normal with 5/5 strength in all extremities. Sensation in tact throughout.   Psychiatric: Good insight. Linear thought process.

## 2022-06-03 NOTE — RESULT ENCOUNTER NOTE
"Rhett's thyroid ultrasound confirmed that there is a 2.6cm x 2.4cm x 2.2cm nodule on the right side of the thyroid gland (in the neck).  The next step to determine what the cause of this nodule is a \"fine needle aspiration\" where a doctor numbs the area of the thyroid gland and uses a small needle to get a sample of the nodule to determine what the nodule is and how it should be treated.  The procedure is  typically done in the specialist's office and typically only takes a short time, about 30 minutes.  I have placed a referral for you to meet with an endocrinologist (thyroid specialist) to get this additional testing and determine next steps.  If you have not been called to set up this specialist appointment within the next couple of days, please call the referral coordinator \"Megan\" at [facility] to help set up the appointment ([phone])  "

## 2022-06-06 ENCOUNTER — MEDICAL CORRESPONDENCE (OUTPATIENT)
Dept: HEALTH INFORMATION MANAGEMENT | Facility: CLINIC | Age: 77
End: 2022-06-06
Payer: COMMERCIAL

## 2022-06-13 NOTE — PROGRESS NOTES
Pulmonary Clinic Outpatient Follow-Up  6/22/2022     Assessment and Plan:   #. AECOPD, recently started on a prednisone/Augmentin regimen.  #. COPD with moderately severe obstruction.      No change to her inhaler regimen    Continue her current prednisone dose (a bit high, but reasonable)    Switch Augmentin to Z-pack    Use DuoNeb 4 times/daily    Action Plan -- prednisone 40 mg daily x 5 days with a Z-;ack. May repeat x1, but if fails 2 action plan activations back-to-back, will need to go the the ED.    If she has a racing heartbeat again once off Augmentin, then we can switch her prednisone to 20 mg daily for a 7 day course since I suspect that her palpitations may be due to that    RTC: 6 months    Vida Marquez MD  Pulmonary and Critical Care   ______________________________________________________________________________    CC: follow up    HPI:   Rhett Alvarado is a 77 year old non-smoker with history of COPD with moderately severe obstruction, chronic mediastinitis, lymphoma in remission, presenting today for follow up of her breathing.  Established care in our clinic 3/22/2022; started Combivent 3 times daily for her maintenance therapy.  Treatment difficulties due to inability to afford Spiriva, which apparently is not covered by her insurance.  It looks like she was recently able to start Spiriva Respimat, which has improved her breathing. Was seen by Dr. Garcia 2 days ago w/ symptoms consistent w/ AECOPD -- started on a prednisone burst + Augmentin.     Reports that she started her new medications yesterday, but had cough after taking them (specifically points to her Augmentin) -- about an hour afterwards. They also made her feel that her heart was beating faster than usual. They have also made her feel very shaky & poorly. The timing of her shakiness correlates to her taking the second (evening) Augmentin dose.     Her daughter noticed that the mucus is very thick and she is struggling to breathe a lot  more than usual. She is using her nebulizer 2-3 times/day & it does help with her breathing & lightens her cough.     REVIEW OF SYSTEMS: 10-point ROS was negative with exceptions as detailed in the HPI.    Physical Exam:  /62 (BP Location: Left arm, Patient Position: Sitting, Cuff Size: Adult Large)   Pulse 86   Wt 61.4 kg (135 lb 6 oz)   SpO2 96%   BMI 28.29 kg/m    Gen: shaky, nervous appearing elderly woman, alert, oriented, mild distress  HEENT: masked, PERLL; no stridor but audible lower airway wheeze in the office  CV: RRR, no M/G/R  Resp: equal bilateral air entry, breath sounds tight & wheezy throughout. Talking in full sentences w/ no respiratory distress; no cough during our visit  Abd: deferred  Skin: no apparent rashes  Ext: no cyanosis, clubbing or edema  Neuro: alert, nonfocal    Labs: personally reviewed & interpreted in EMR.   Imaging & Procedures: personally reviewed & interpreted, including formal Radiology reports in the EMR.  #. CTA chest, 2/9/2022:  ANGIOGRAM CHEST: No evidence pulmonary embolism. Main pulmonary artery is 3.5 cm. This suggests pulmonary arterial hypertension. No aortic dissection. Stable ascending aortic 4.2 cm aneurysm. No CT evidence of right heart strain.  LUNGS AND PLEURA: Chronic left upper lobe atelectasis and scar. Chronic right middle lobe atelectasis and scar. The left upper lobe and middle lobe bronchi are obstructed by heterogeneous perihilar soft tissue which extends into the mediastinum. This was present in 2013, and presumably due to chronic mediastinitis. Soft tissue thickening surrounds the right and left lower lobe bronchi, similar to previous. No effusion.  MEDIASTINUM/AXILLAE: Several small aortopulmonary window lymph nodes. Stable right thyroid 2.5 cm nodule.   CORONARY ARTERY CALCIFICATION: None.  UPPER ABDOMEN: Normal.  MUSCULOSKELETAL: Lower thoracic vertebral body hemangioma.     PFT's (3/16/2022): Moderately severe obstruction without  clinically significant bronchodilator response, presence of air trapping without hyperinflation, normal diffusion capacity.         MEDICAL HISTORY: Hypertension, diffuse large cell lymphoma in remission, COPD, tuberculosis, dermatitis,  SURGICAL HISTORY:  has a past surgical history that includes IR Miscellaneous Procedure (2/20/2012) and IR Port Removal (7/20/2012).  SOCIAL HISTORY:  reports that she has never smoked. She has never used smokeless tobacco. She reports that she does not drink alcohol.  FAMILY HISTORY: family history includes No Known Problems in her daughter, father, maternal aunt, maternal grandfather, maternal grandmother, mother, paternal aunt, paternal grandfather, paternal grandmother, and sister.    MEDICATIONS: personally reviewed, including EMR/Care Everywhere. Pertinent information noted & updated.   ALLERGIES:   Allergies   Allergen Reactions     No Known Allergies

## 2022-06-15 ENCOUNTER — APPOINTMENT (OUTPATIENT)
Dept: INTERPRETER SERVICES | Facility: CLINIC | Age: 77
End: 2022-06-15
Payer: COMMERCIAL

## 2022-06-20 ENCOUNTER — OFFICE VISIT (OUTPATIENT)
Dept: FAMILY MEDICINE | Facility: CLINIC | Age: 77
End: 2022-06-20
Payer: COMMERCIAL

## 2022-06-20 VITALS
OXYGEN SATURATION: 95 % | DIASTOLIC BLOOD PRESSURE: 81 MMHG | RESPIRATION RATE: 24 BRPM | SYSTOLIC BLOOD PRESSURE: 129 MMHG | HEART RATE: 97 BPM | TEMPERATURE: 98.7 F

## 2022-06-20 DIAGNOSIS — J44.1 COPD EXACERBATION (H): Primary | ICD-10-CM

## 2022-06-20 PROBLEM — N81.2 CYSTOCELE WITH INCOMPLETE UTEROVAGINAL PROLAPSE: Status: ACTIVE | Noted: 2022-06-14

## 2022-06-20 PROCEDURE — U0005 INFEC AGEN DETEC AMPLI PROBE: HCPCS | Performed by: STUDENT IN AN ORGANIZED HEALTH CARE EDUCATION/TRAINING PROGRAM

## 2022-06-20 PROCEDURE — 99214 OFFICE O/P EST MOD 30 MIN: CPT | Mod: CS | Performed by: STUDENT IN AN ORGANIZED HEALTH CARE EDUCATION/TRAINING PROGRAM

## 2022-06-20 PROCEDURE — U0003 INFECTIOUS AGENT DETECTION BY NUCLEIC ACID (DNA OR RNA); SEVERE ACUTE RESPIRATORY SYNDROME CORONAVIRUS 2 (SARS-COV-2) (CORONAVIRUS DISEASE [COVID-19]), AMPLIFIED PROBE TECHNIQUE, MAKING USE OF HIGH THROUGHPUT TECHNOLOGIES AS DESCRIBED BY CMS-2020-01-R: HCPCS | Performed by: STUDENT IN AN ORGANIZED HEALTH CARE EDUCATION/TRAINING PROGRAM

## 2022-06-20 RX ORDER — PREDNISONE 50 MG/1
50 TABLET ORAL DAILY
Qty: 5 TABLET | Refills: 0 | Status: SHIPPED | OUTPATIENT
Start: 2022-06-20 | End: 2022-07-07

## 2022-06-20 NOTE — PROGRESS NOTES
Assessment & Plan     (J44.1) COPD exacerbation (H)  (primary encounter diagnosis)  Comment: Meeting 3/3 criteria for acute exacerbation -- moderate in severity. ?etiology - r/o COVID today. Meeting criteria for abx and steroids given these. Does not show signs of HF vs alternate pathology. Wells score 1 (hx lymphoma); low risk PE. VS reasonable, no overt respiratory distress on exam; no indication ED vs higher level of care at present.  Plan:   - Conservative measures    Avoid smoke / triggers to extent possible   - Symptomatic; Yes; 6/17/2022 COVID-19 Virus (Coronavirus) by PCR Nose   Provided N95 masks today    Recommend distancing    Have family monitor closely    Discussed she would meet criteria for oral antiviral if positive   - Discussed no indication CXR given treating with abx   - amoxicillin-clavulanate (AUGMENTIN) 875-125 MG tablet; Take 1 tablet by mouth 2 times daily  Dispense: 10 tablet; Refill: 0   Per UTD algorithm   - predniSONE (DELTASONE) 50 MG tablet; Take 1 tablet (50 mg) by mouth daily  Dispense: 5 tablet; Refill: 0  - Provided education on inhalers (use as directed by Dr. Marquez and Dr. Burch)   - Can use neb PRN   - Discussed reasons to call clinic vs present to ED   - Strongly suggest she come in for repeat eval in 1-2 weeks with all inhalers and other meds for full med rec (physician plus PharmD)   - Future: pneumococcal vaccine     Future:   - Address lymphoma as HCC dx      *Regarding complexity of MDM:  1. Number/comlexity of conditions: 1 dx in acute exacerbation    2. Amount/complexity of data reviewed/analyzed: moderate    Review of external tests (prior PFT)    Ordering of unique test - COVID swab    Review of external pulm notes    3. Risk of complications of management: moderate - med management    Warrants level 62095 code    Danyel Garcia DO  [facility]    Precepted with Dr. 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   Remitting factors: none    Taking robitussin for the cough   - Albuterol -- bid   - Tiotropium -- as needed   - Duoneb -- twice per day   Symptoms associated/denied (as below):      Fever - no   Chills - no    Chest pain - no   Confusion - no   Lightheadedness / dizziness / syncope - no      smokes   She does not smoke     She is COVID vaccinated x 4     From most recent pulm visit   Rhett Alvarado is a 76 year old non-smoker with history of COPD with moderately severe obstruction and radiographic changes consistent with chronic mediastinitis (per radiology) stable since 2013, AECOPD mid February 2022, lymphoma in remission presenting today for evaluation of dyspnea.  Chronic relevant medications include Spiriva (started 2/14/2022) and albuterol.     Reports that her breathing is a problem but her inhaler & nebulizer treatments help. She has been on some sort of an inhaler for about 7 years. She cannot afford a maintenance inhaler, so did not use Spiriva. She has been on albuterol -- approximately 2x/day, every day. She becomes short of breath when she is depressed (when her  yells at her) and when she has to walk fast even on flat ground. She states that she is not as dyspneic when she is not depressed. When she uses albuterol, it  reliably helps her breathing. She does have a nebulizer machine when her albuterol is not helping. She ran out of her nebulized medication about a month ago. She only wheezes when she is sick, which happened about once every couple of years when she lived in [place] ([id number]). When she lived in [place] [id number] she did get sick more often than when she lived in [place].     Assessment and Plan:   #. COPD, more frequent exacerbations in [place] rather than [place]. Has not been able to afford appropriate maintenance inhalers because her insurance is not covering them. Her clinical exam does not show an acute exacerbation at this time.       Trial of Combivent TID as her maintenance    Refills for DuoNeb    Referral to Pharmacy Liaison for Medication Coverage      Review of Systems   Constitutional, HEENT, cardiovascular, pulmonary, GI, , musculoskeletal, neuro, skin, endocrine and psych systems are negative, except as otherwise noted.      Objective    /81 (BP Location: Left arm, Patient Position: Sitting, Cuff Size: Adult Regular)   Pulse 97   Temp 98.7  F (37.1  C) (Oral)   Resp 24   SpO2 95%   There is no height or weight on file to calculate BMI.  Physical Exam   Gen: Pleasant. No distress.    HEENT: MMM.   Neck: No overt asymmetry. No JVD. Negative HJR.   CV: Appears well-perfused. RRR. No murmur.   Resp: Breathing relatively comfortably on room air. Lung sounds diffusely wheezy without focal crackles.   Abd: Non-distended, non-tender.   Ext: No edema or overt asymmetry/deformity.   Skin: No overt rash on easily visualized skin.   Neuro: Non-focal.   Psych: Calm.     Patient supplied answers from flow sheet for:  COPD Assessment Test (CAT)  2009 [employer]. All rights reserved.  COPD assessment test (CAT) 2/14/2022 6/2/2022 6/20/2022   Cough 1 0 2   Phlegm 1 0 1   Chest tightness 3 0 3   Walk up hill 3 2 3   Limited activities 0 0 4   Leaving my home 2 3 4   Sleep 4 0 3   Energy 5 4 4   Total Score 19 9 24      CAT  Key:  The CAT consist of 8 items which are each scored 0-5. The total score ranges from 0-40 with higher scores representing a poorer health status. When interpreting CAT scores, the individual s disease severity should be considered.   Low impact  (1-9)  Medium impact  (10-20)  High impact  (21-30)  Very high impact  (31-40)          .  ..

## 2022-06-20 NOTE — NURSING NOTE
Due to patient being non-English speaking/uses sign language, an  was used for this visit. Only for face-to-face interpretation by an external agency, date and length of interpretation can be found on the scanned worksheet.     name: Henny Crawford  Agency: [employer]  Language: Ros   Telephone number:   Type of interpretation: Face-to-face, spoken

## 2022-06-21 LAB — SARS-COV-2 RNA RESP QL NAA+PROBE: NEGATIVE

## 2022-06-22 ENCOUNTER — OFFICE VISIT (OUTPATIENT)
Dept: PULMONOLOGY | Facility: OTHER | Age: 77
End: 2022-06-22
Payer: COMMERCIAL

## 2022-06-22 VITALS
HEART RATE: 86 BPM | WEIGHT: 135.38 LBS | SYSTOLIC BLOOD PRESSURE: 118 MMHG | OXYGEN SATURATION: 96 % | DIASTOLIC BLOOD PRESSURE: 62 MMHG | BODY MASS INDEX: 28.29 KG/M2

## 2022-06-22 DIAGNOSIS — J44.1 COPD EXACERBATION (H): Primary | ICD-10-CM

## 2022-06-22 PROCEDURE — 99213 OFFICE O/P EST LOW 20 MIN: CPT | Performed by: INTERNAL MEDICINE

## 2022-06-22 RX ORDER — AZITHROMYCIN 250 MG/1
TABLET, FILM COATED ORAL
Qty: 6 TABLET | Refills: 0 | Status: SHIPPED | OUTPATIENT
Start: 2022-06-22 | End: 2022-06-27

## 2022-06-22 NOTE — PATIENT INSTRUCTIONS
You are having an exacerbation (flare) of your COPD    Plan:  Stop taking your antibiotic (I took it away).  Continue taking the prednisone until all the medication is gone. I recommend taking it in the morning -- take it with food.   I am sending you a different antibiotic to take once a day -- take it with food too.  Use your nebulizer medication 4 times per day while you are so sick.  If you are feeling worse after all of your medication is gone, you will need to go to the hospital.    You should follow up in 6 months.     If you have any questions or concerns, please, call our clinic at [phone].

## 2022-06-24 ENCOUNTER — OFFICE VISIT (OUTPATIENT)
Dept: DERMATOLOGY | Facility: CLINIC | Age: 77
End: 2022-06-24
Payer: COMMERCIAL

## 2022-06-24 DIAGNOSIS — L91.0 KELOID SCAR: Primary | ICD-10-CM

## 2022-06-24 PROCEDURE — 99212 OFFICE O/P EST SF 10 MIN: CPT | Performed by: NURSE PRACTITIONER

## 2022-06-24 ASSESSMENT — PAIN SCALES - GENERAL: PAINLEVEL: NO PAIN (0)

## 2022-06-24 NOTE — PROGRESS NOTES
Preceptor Attestation:   Patient seen, evaluated and discussed with the resident. I have verified the content of the note, which accurately reflects my assessment of the patient and the plan of care.    Supervising Physician:Arie Velarde    [facility]

## 2022-06-24 NOTE — LETTER
6/24/2022         RE: Rhett Alvarado  [address] [facility] [address]        Dear Colleague,    Thank you for referring your patient, Rhett Alvarado, to the [facility]. Please see a copy of my visit note below.    [facility] Dermatology Note  Encounter Date: Jun 24, 2022  Office Visit     Reviewed patients past medical history and pertinent chart review prior to patients visit today.     Dermatology Problem List:  Keloid scarring,     ____________________________________________    Assessment & Plan:     # Keloid scars. Benign.  She is very happy with the improvement that she got from last visit injections as well as the betamethasone augmented formula ointment that she has been using.  Although she does still want more injections she is not feeling all that well today and would like to defer injections today and will plan to treat with more injections at her next visit.    -continue betamethasone augmented formula  0.05% ointment to be applied only to thickened hypertrophic areas of skin twice daily until follow-up visit    -She is scheduled her with Dr. Dalton in September which is his first available and I will continue to see patient monthly for injections if they are helpful until that visit.    Arlyn Joiner, JUDE CNP on 5/27/2022 at 2:50 PM   _______________________________________    CC: keloid scars  HPI:  Ms. Rhett Alvarado is a(n) 77 year old female who presents today as a return patient for follow-up of hypertrophic keloid scarring on the trunk.      History: I received outside records from 2016 and 2018 when she has had Kenalog injections to these lesions.  It sounds like she had this injected about 1 month ago as well at an outside clinic but I do not have those records.  Came to me first 5/27/22 stating that she is having a lot of pain, discomfort, and itching.  It keeps her up at night sometimes.  She says that they started because of her bra rubbing on  her skin so these are under both arms on the flanks where her underwire would lay and on both posterior shoulders where the bra strap lays.      Treatment with Kenalog 40 to thickened areas of scars on 5/27/2022 also given betamethasone augmented formula 0.05% ointment to be applied twice daily.    Today she states that they are much less painful and irritated and she thinks the cream and injections have helped a lot.  She is not feeling all that well today and feels like she has a cold.  She says that she has been COVID tested and it was negative.      Patient is otherwise feeling well, without additional skin concerns.      Physical Exam:  Vitals: There were no vitals taken for this visit.  SKIN: Waist-up skin, which includes the head/face, neck, both arms, chest, back, abdomen, digits and/or nails was examined.  -4 very large irregular shaped thick hyperkeratotic firm pink scars on the bilateral shoulders and flanks extending under the breasts.  Please see photographs for sizes.     - No other lesions of concern on areas examined.     Medications:  Current Outpatient Medications   Medication     acetaminophen (TYLENOL) 325 MG tablet     albuterol (PROAIR HFA/PROVENTIL HFA/VENTOLIN HFA) 108 (90 Base) MCG/ACT inhaler     amLODIPine (NORVASC) 5 MG tablet     aspirin 81 MG EC tablet     atorvastatin (LIPITOR) 40 MG tablet     augmented betamethasone dipropionate (DIPROLENE AF) 0.05 % external cream     azithromycin (ZITHROMAX) 250 MG tablet     betamethasone valerate (VALISONE) 0.1 % external cream     carboxymethylcellulose PF (REFRESH LIQUIGEL) 1 % ophthalmic gel     ipratropium - albuterol 0.5 mg/2.5 mg/3 mL (DUONEB) 0.5-2.5 (3) MG/3ML neb solution     polyethylene glycol (MIRALAX) 17 GM/Dose powder     predniSONE (DELTASONE) 50 MG tablet     tiotropium (SPIRIVA RESPIMAT) 2.5 MCG/ACT inhaler     No current facility-administered medications for this visit.      Past Medical History:   Patient Active Problem List    Diagnosis     Fatigue     Hyponatremia     Acute Lymphoma     Keloid scar     Abdominal Pain     Benign Essential Hypertension     Palpitations     Chronic Obstructive Pulmonary Disease     Wheezing (Symptom)     Constipation     Dermatitis     Lung mass     Muscle weakness (generalized)     Pneumonia     Prolapse of vaginal wall with midline cystocele     COPD exacerbation (H)     E. coli UTI     Hypertensive disorder     Hyponatremia     Malignant lymphoma, large cell, diffuse (H)     Prediabetes     Tuberculosis     Cystocele with incomplete uterovaginal prolapse     No past medical history on file.   Photos from visit 5/27/2022:                    CC Matthew Diop MD  [address] [place] [address] on close of this encounter.       Again, thank you for allowing me to participate in the care of your patient.        Sincerely,        JUDE Hernandez CNP

## 2022-06-24 NOTE — PROGRESS NOTES
[facility] Dermatology Note  Encounter Date: Jun 24, 2022  Office Visit     Reviewed patients past medical history and pertinent chart review prior to patients visit today.     Dermatology Problem List:  Keloid scarring,     ____________________________________________    Assessment & Plan:     # Keloid scars. Benign.  She is very happy with the improvement that she got from last visit injections as well as the betamethasone augmented formula ointment that she has been using.  Although she does still want more injections she is not feeling all that well today and would like to defer injections today and will plan to treat with more injections at her next visit.    -continue betamethasone augmented formula  0.05% ointment to be applied only to thickened hypertrophic areas of skin twice daily until follow-up visit    -She is scheduled her with Dr. Dalton in September which is his first available and I will continue to see patient monthly for injections if they are helpful until that visit.    Arlyn Joiner, APRN CNP on 5/27/2022 at 2:50 PM   _______________________________________    CC: keloid scars  HPI:  Ms. Rhett Alvarado is a(n) 77 year old female who presents today as a return patient for follow-up of hypertrophic keloid scarring on the trunk.      History: I received outside records from 2016 and 2018 when she has had Kenalog injections to these lesions.  It sounds like she had this injected about 1 month ago as well at an outside clinic but I do not have those records.  Came to me first 5/27/22 stating that she is having a lot of pain, discomfort, and itching.  It keeps her up at night sometimes.  She says that they started because of her bra rubbing on her skin so these are under both arms on the flanks where her underwire would lay and on both posterior shoulders where the bra strap lays.      Treatment with Kenalog 40 to thickened areas of scars on 5/27/2022 also given betamethasone  augmented formula 0.05% ointment to be applied twice daily.    Today she states that they are much less painful and irritated and she thinks the cream and injections have helped a lot.  She is not feeling all that well today and feels like she has a cold.  She says that she has been COVID tested and it was negative.      Patient is otherwise feeling well, without additional skin concerns.      Physical Exam:  Vitals: There were no vitals taken for this visit.  SKIN: Waist-up skin, which includes the head/face, neck, both arms, chest, back, abdomen, digits and/or nails was examined.  -4 very large irregular shaped thick hyperkeratotic firm pink scars on the bilateral shoulders and flanks extending under the breasts.  Please see photographs for sizes.     - No other lesions of concern on areas examined.     Medications:  Current Outpatient Medications   Medication     acetaminophen (TYLENOL) 325 MG tablet     albuterol (PROAIR HFA/PROVENTIL HFA/VENTOLIN HFA) 108 (90 Base) MCG/ACT inhaler     amLODIPine (NORVASC) 5 MG tablet     aspirin 81 MG EC tablet     atorvastatin (LIPITOR) 40 MG tablet     augmented betamethasone dipropionate (DIPROLENE AF) 0.05 % external cream     azithromycin (ZITHROMAX) 250 MG tablet     betamethasone valerate (VALISONE) 0.1 % external cream     carboxymethylcellulose PF (REFRESH LIQUIGEL) 1 % ophthalmic gel     ipratropium - albuterol 0.5 mg/2.5 mg/3 mL (DUONEB) 0.5-2.5 (3) MG/3ML neb solution     polyethylene glycol (MIRALAX) 17 GM/Dose powder     predniSONE (DELTASONE) 50 MG tablet     tiotropium (SPIRIVA RESPIMAT) 2.5 MCG/ACT inhaler     No current facility-administered medications for this visit.      Past Medical History:   Patient Active Problem List   Diagnosis     Fatigue     Hyponatremia     Acute Lymphoma     Keloid scar     Abdominal Pain     Benign Essential Hypertension     Palpitations     Chronic Obstructive Pulmonary Disease     Wheezing (Symptom)     Constipation      Dermatitis     Lung mass     Muscle weakness (generalized)     Pneumonia     Prolapse of vaginal wall with midline cystocele     COPD exacerbation (H)     E. coli UTI     Hypertensive disorder     Hyponatremia     Malignant lymphoma, large cell, diffuse (H)     Prediabetes     Tuberculosis     Cystocele with incomplete uterovaginal prolapse     No past medical history on file.   Photos from visit 5/27/2022:                    CC Matthew Diop MD  [address] [place] [address] on close of this encounter.

## 2022-06-27 ENCOUNTER — TELEPHONE (OUTPATIENT)
Dept: FAMILY MEDICINE | Facility: CLINIC | Age: 77
End: 2022-06-27

## 2022-06-27 NOTE — TELEPHONE ENCOUNTER
[facility] phone call message- general phone call:    Reason for call: Michael called stating [facility] has been waiting on the cane order that was faxed over 6/23/2022 to be signed by provider and also he asked if provider can send an order over for incontinence supplies order for pull-ups and pads to [facility]. Please advise, call if any questions.    Return call needed: Yes    OK to leave a message on voice mail? Yes    Primary language: Hmong      needed? Yes    Call taken on June 27, 2022 at 2:57 PM by Otis Travis

## 2022-07-06 ENCOUNTER — TELEPHONE (OUTPATIENT)
Dept: FAMILY MEDICINE | Facility: CLINIC | Age: 77
End: 2022-07-06

## 2022-07-06 NOTE — TELEPHONE ENCOUNTER
Called patient to remind of appt and to bring all meds to appt.   Allyson Anderson, EMT  July 6, 2022  11:28 AM'

## 2022-07-06 NOTE — PROGRESS NOTES
Assessment and Plan     (J44.9) Chronic obstructive pulmonary disease, unspecified COPD type (H)  (primary encounter diagnosis)  Comment: Had recent exacerbation and took half a course of prednisone and azithromycin but then stopped as she was feeling better. Was supposed to be taking spiriva respimat 2 puffs nightly but upon providing inhaler teaching today- realized patient didn't have any medication loaded and thus hasn't been receiving any of her spiriva respimat for at least the last month; corrected this and discussed extensively today. Using albuterol prn, using ~2x a day. Uses duoneb 3x a day and states her pulmonologist told her to schedule them at this point. Used to see pulmonology but hasn't regularly gone lately per patient.  Plan:   -Refilled spiriva respimat 2 puffs nightly  -Refilled prn albuterol   -Provided counseling on ways to correctly take spiriva soft mist vs albuterol MDI  -Refilled prn duonebs  -Follow-up in 3 months for COPD visit  -Consider repeat PFTs this year!    (E04.1) Thyroid nodule  Comment: Asymptomatic thyroid nodule w/ ultrasound findings of mid thyroid nodule that meets criteria for tissue sampling; also with left and isthmus nodules meet criteria for twelve-month follow-up.   Plan:   -Endo appt for FNA in 12/2022; earliest appt she can get  -Follow-up thyroid ultrasound in 1 year    (I10) Benign Essential Hypertension  Comment: Has been on amlodipine 5mg daily. Not checking BP at home. /94 today but not desiring increase in BP meds today.  Plan:   -Continue amLODIPine (NORVASC) 5mg daily    (N18.2) CKD (chronic kidney disease) stage 2, GFR 60-89 ml/min  Comment: GFR 77 at recent lab check. Not on ACE/ARB. Not regularly taking NSAIDs other than daily baby aspirin.  Plan:   -Risk management as above  -Consider BMP at next visit        Options for treatment and follow-up care were reviewed with the patient and/or guardian. Rhett Alvarado and/or guardian engaged in the decision  making process and verbalized understanding of the options discussed and agreed with the final plan.    Manoj Burch MD      Precepted today with: Dr Thomas           HPI:       Rhett Alvarado is a 77 year old  female with pertinent hx of COPD, HTN, HLD, pre-diabetes, keloid scars over thorax following with derm, who presents for medication checkup:    At last visit with me on 6/2/22:  (W19.XXXD) Fall, subsequent encounter  (primary encounter diagnosis)  Comment: Tripped and fell 5/12 and assessed in ED with relatively unremarkable findings. Pain overall improving and physical exam unremarkable except for scattered healing bruises over right side. Vision appears to be the primary issue leading to fall; patient has appt with eye doctor next month. Not interested in PT at this time given weakness and imbalance aren't primary issues- but could consider in future.  Plan:   -naproxen (NAPROSYN) 250 MG tablet BID for x5 days  -Counseled on home safety  -DME cane ordered prior     (J44.9) Chronic obstructive pulmonary disease, unspecified COPD type (H)  Comment: Denies any exacerbations since starting new inhaler regimen. Taking spiriva respimat 2 puffs nightly and then uses albuterol prn, using only 1-2x a day. Uses duoneb only every few days when she feels herself wheezing.  Plan:   -Continue spiriva respimat 2 puffs nightly  -Refilled prn albuterol   -Continue prn duonebs     (Z00.00) Encounter for preventive care  Comment: Needs wellness visit.  Plan:   -Follow-up in 1-2 months for medicare wellness     (L91.0) Keloid scar  Comment: Large keloids over chest wall, particularly over right bra line, that developed over last few years. Main complaint is the itchiness. Has been following with derm who has been injecting with kenalog and started patient on stronger steroid cream with she finds is helping with the itchiness.  Plan:   -Follow-up with derm  -Continue steroid cream BID     (E04.1) Thyroid nodule  Comment: 2.5cm  nodule of right thyroid gland appreciated incidentally on neck imaging s/p fall. Asymptomatic. Awaiting thyroid ultrasound. No family hx of thyroid cancer.  Plan:   -Thyroid ultrasound     (N18.2) CKD (chronic kidney disease) stage 2, GFR 60-89 ml/min  Comment: GFR 77 at recent lab check. Not on ACE/ARB. Not regularly taking NSAIDs.   Plan:   -Risk management as below  -Consider BMP at medicare wellness visit     (E78.5) Hyperlipidemia, unspecified hyperlipidemia type  Comment: Restarted on atorvastatin 40mg at visit in April as she had prior stopped taking because she didn't know if she was supposed to continue or not.   Plan:  -Repeat lipid profile in 2 months at medicare wellness visit  -Continue atorvastatin 40mg daily     (I10) Essential hypertension, benign  Comment: Has been on amlodipine 5mg daily. Not checking BP at home. BP good today at 130/82.  Plan:   -Continue amLODIPine (NORVASC) 5mg daily     (R73.03) Prediabetes  Comment: Had A1c (6.2) recently; was unchanged from prior. Not on any medications.  Plan:   -Consider repeat A1c at medicare wellness visit      Todays visit:  Here for medication follow-up.  Taking amlodipine 5mg daily, lipitor 40mg daily, aspirin 81mg daily,   Betamethasone 0.05% for keloid.  2 puffs of spiriva respimat daily. Uses albuterol inhaler 2-3x a day as needed. Duonebs taking scheduled 3x a day.       Thyroid ultrasound with findings of:  IMPRESSION:  1.  A mid right thyroid nodule meets criteria for tissue sampling if not already performed.  2.  Left and isthmus nodules meet criteria for twelve-month follow-up.   Thus Dr Vences sent patient for FNA. Patient has scheduled appt for later this year. Unfortunately earliest she can get in is December. Confirmed this with patient today.    A FindIt  was used for this visit         PMHX:     Patient Active Problem List   Diagnosis     Fatigue     Hyponatremia     Acute Lymphoma     Keloid scar     Abdominal Pain     Benign  Essential Hypertension     Palpitations     Chronic Obstructive Pulmonary Disease     Wheezing (Symptom)     Constipation     Dermatitis     Lung mass     Muscle weakness (generalized)     Pneumonia     Prolapse of vaginal wall with midline cystocele     COPD exacerbation (H)     E. coli UTI     Hypertensive disorder     Hyponatremia     Malignant lymphoma, large cell, diffuse (H)     Prediabetes     Tuberculosis     Cystocele with incomplete uterovaginal prolapse       Current Outpatient Medications   Medication Sig Dispense Refill     acetaminophen (TYLENOL) 325 MG tablet Take 650 mg by mouth every 6 hours as needed       albuterol (PROAIR HFA/PROVENTIL HFA/VENTOLIN HFA) 108 (90 Base) MCG/ACT inhaler Inhale 2 puffs into the lungs every 6 hours as needed for shortness of breath / dyspnea or wheezing 18 g 3     amLODIPine (NORVASC) 5 MG tablet Take 1 tablet (5 mg) by mouth daily 90 tablet 1     aspirin 81 MG EC tablet Take 81 mg by mouth daily       atorvastatin (LIPITOR) 40 MG tablet Take 1 tablet (40 mg) by mouth daily 90 tablet 1     augmented betamethasone dipropionate (DIPROLENE AF) 0.05 % external cream Apply topically 2 times daily To thickened areas of scars 50 g 0     betamethasone valerate (VALISONE) 0.1 % external cream Apply topically 2 times daily 45 g 1     carboxymethylcellulose PF (REFRESH LIQUIGEL) 1 % ophthalmic gel Place 1 drop into both eyes 2 times daily as needed for dry eyes 30 each 1     ipratropium - albuterol 0.5 mg/2.5 mg/3 mL (DUONEB) 0.5-2.5 (3) MG/3ML neb solution Take 1 vial (3 mLs) by nebulization every 6 hours as needed for shortness of breath / dyspnea or wheezing 360 mL 3     tiotropium (SPIRIVA RESPIMAT) 2.5 MCG/ACT inhaler Inhale 2 puffs into the lungs daily 4 g 1     polyethylene glycol (MIRALAX) 17 GM/Dose powder Take 17 g by mouth daily (Patient not taking: No sig reported) 510 g 0       Social History     Tobacco Use     Smoking status: Passive Smoke Exposure - Never Smoker  "    Smokeless tobacco: Never Used   Substance Use Topics     Alcohol use: No          Allergies   Allergen Reactions     No Known Allergies        No results found for this or any previous visit (from the past 24 hour(s)).         Review of Systems:     10 point ROS negative except for what is noted in HPI          Physical Exam:     Vitals:    07/07/22 1558 07/07/22 1600   BP: (!) 151/88 (!) 146/94   Pulse: 75    Resp: 22    Temp: 97.9  F (36.6  C)    SpO2: 97%    Weight: 63 kg (139 lb)    Height: 1.473 m (4' 10\")      Body mass index is 29.05 kg/m .    General: Sitting comfortably. No acute distress.   HEENT: Conjunctivae are clear, nonicteric.   Neck: Small mid thyroid nodule.  Respiratory: No respiratory distress and satting 97% on RA. Lung sounds with some diffuse wheezing after full walk around clinic, improved after albuterol inhaler.  Cardiac: RRR. No m/g/r. Brisk cap refill.  Abdominal: Abdomen is soft and non-tender.  Extremities: Upper and lower extremities grossly normal.  Skin: Skin in warm without rashes.  Neurological: Motor function is grossly normal. Normal mentation. Normal gait.  Psychiatric: Good insight. Pleasant affect.     "

## 2022-07-07 ENCOUNTER — OFFICE VISIT (OUTPATIENT)
Dept: FAMILY MEDICINE | Facility: CLINIC | Age: 77
End: 2022-07-07
Payer: COMMERCIAL

## 2022-07-07 VITALS
SYSTOLIC BLOOD PRESSURE: 146 MMHG | TEMPERATURE: 97.9 F | HEIGHT: 58 IN | HEART RATE: 75 BPM | DIASTOLIC BLOOD PRESSURE: 94 MMHG | OXYGEN SATURATION: 97 % | RESPIRATION RATE: 22 BRPM | BODY MASS INDEX: 29.18 KG/M2 | WEIGHT: 139 LBS

## 2022-07-07 DIAGNOSIS — I10 ESSENTIAL HYPERTENSION, BENIGN: ICD-10-CM

## 2022-07-07 DIAGNOSIS — N18.2 CKD (CHRONIC KIDNEY DISEASE) STAGE 2, GFR 60-89 ML/MIN: ICD-10-CM

## 2022-07-07 DIAGNOSIS — E04.1 THYROID NODULE: ICD-10-CM

## 2022-07-07 DIAGNOSIS — J44.9 CHRONIC OBSTRUCTIVE PULMONARY DISEASE, UNSPECIFIED COPD TYPE (H): Primary | ICD-10-CM

## 2022-07-07 PROCEDURE — 99214 OFFICE O/P EST MOD 30 MIN: CPT | Mod: GC | Performed by: STUDENT IN AN ORGANIZED HEALTH CARE EDUCATION/TRAINING PROGRAM

## 2022-07-07 RX ORDER — ALBUTEROL SULFATE 90 UG/1
2 AEROSOL, METERED RESPIRATORY (INHALATION) EVERY 6 HOURS PRN
Qty: 18 G | Refills: 3 | Status: SHIPPED | OUTPATIENT
Start: 2022-07-07 | End: 2022-12-20

## 2022-07-07 RX ORDER — IPRATROPIUM BROMIDE AND ALBUTEROL SULFATE 2.5; .5 MG/3ML; MG/3ML
1 SOLUTION RESPIRATORY (INHALATION) EVERY 6 HOURS PRN
Qty: 360 ML | Refills: 3 | Status: SHIPPED | OUTPATIENT
Start: 2022-07-07 | End: 2022-12-20

## 2022-07-07 NOTE — PROGRESS NOTES
epipenPreceptor Attestation:   Patient seen, evaluated and discussed with the resident. I have verified the content of the note, which accurately reflects my assessment of the patient and the plan of care.    Supervising Physician:Jacquelyn Thomas MD    [facility]

## 2022-07-07 NOTE — NURSING NOTE
Due to patient being non-English speaking/uses sign language, an  was used for this visit. Only for face-to-face interpretation by an external agency, date and length of interpretation can be found on the scanned worksheet.     name: Иван  Agency: [employer]  Language: Ros   Telephone number: [phone]  Type of interpretation: Face-to-face, spoken

## 2022-07-23 ENCOUNTER — HEALTH MAINTENANCE LETTER (OUTPATIENT)
Age: 77
End: 2022-07-23

## 2022-07-29 ENCOUNTER — OFFICE VISIT (OUTPATIENT)
Dept: DERMATOLOGY | Facility: CLINIC | Age: 77
End: 2022-07-29
Payer: COMMERCIAL

## 2022-07-29 DIAGNOSIS — L91.0 KELOID SCAR: Primary | ICD-10-CM

## 2022-07-29 PROCEDURE — 11901 INJECT SKIN LESIONS >7: CPT | Performed by: NURSE PRACTITIONER

## 2022-07-29 RX ORDER — TRIAMCINOLONE ACETONIDE 40 MG/ML
4 INJECTION, SUSPENSION INTRA-ARTICULAR; INTRAMUSCULAR ONCE
Status: COMPLETED | OUTPATIENT
Start: 2022-07-29 | End: 2022-07-29

## 2022-07-29 RX ADMIN — TRIAMCINOLONE ACETONIDE 160 MG: 40 INJECTION, SUSPENSION INTRA-ARTICULAR; INTRAMUSCULAR at 16:08

## 2022-07-29 NOTE — LETTER
7/29/2022         RE: Rhett Alvarado  [address] [facility] [address]        Dear Colleague,    Thank you for referring your patient, Rhett Alvarado, to the [facility]. Please see a copy of my visit note below.    [facility] Dermatology Note  Encounter Date: Jul 29, 2022  Office Visit     Reviewed patients past medical history and pertinent chart review prior to patients visit today.     Dermatology Problem List:  Keloid scarring, betamethasone augmented formula  0.05% ointment PRN, ILK 5/27/2022 and 7/29/2022    ____________________________________________    Assessment & Plan:     # Keloid scars    -Kenalog intralesional injection procedure note: After verbal consent and discussion of risks including but not limited to atrophy, pain, and bruising, time out was performed, 4 total cc of Kenalog 40 mg/cc was injected into all site(s) on all bilateral flanks and bilateral posterior shoulders and central chest only in the thickest areas of scars.  The patient tolerated the procedure well and left the Dermatology clinic in good condition.   Ice packs given to take home.    -Continue betamethasone augmented formula  0.05% ointment to be applied only to thickened hypertrophic areas of skin twice daily as needed for symptom relief    -Try Sarna original lotion kept in the refrigerator when these get painful and see if that helps temporarily with the pain.    -Patient had referral to Dr. Dalton for consultation for further treatment for September but it got canceled likely by her daughter she thinks.  She would like to schedule another consultation with him.  Consultation made for February 2023 which is his next available to discuss further treatment options.     Arlyn Joiner, APRN CNP on 7/29/2022 at 4:16 PM   _______________________________________    CC: keloid scars  HPI:  Ms. Rhett Alvarado is a(n) 77 year old female who presents today as a follow-up for hypertrophic  keloid scarring on the trunk.  She had a lot of improvement after her first injection at the end of May but then when she came back in June she had a cough and was not feeling all that well and did not want injections.  She thought the improvement would stay but she has gotten a lot worse over the last couple of weeks and it is been very painful and uncomfortable.  She is ready for more injections.    History of keloids: I received outside records from 2016 and 2018 when she has had Kenalog injections to these lesions.  It sounds like she had this injected about 1 month ago as well at an outside clinic but I do not have those records.  Patient says that she is having a lot of pain, discomfort, and itching.  It keeps her up at night sometimes.  She would like to have treatment of these.  She says that they started because of her bra rubbing on her skin so these are under both arms on the flanks where her underwire would lay and on both posterior shoulders where the bra strap lays.      She comes to appointments with her daughter or another young female who she says helps take her to appointments and translate for in [facility]     Patient is otherwise feeling well, without additional skin concerns.      Physical Exam:  Vitals: There were no vitals taken for this visit.  SKIN: Waist-up skin, which includes the head/face, neck, both arms, chest, back, abdomen, digits and/or nails was examined.  -4 very large irregular shaped thick hyperkeratotic firm pink scars on the bilateral shoulders and bilateral flanks extending under the breasts.  She also has 1 on the central chest where she had a port previously    - No other lesions of concern on areas examined.     Medications:  Current Outpatient Medications   Medication     acetaminophen (TYLENOL) 325 MG tablet     albuterol (PROAIR HFA/PROVENTIL HFA/VENTOLIN HFA) 108 (90 Base) MCG/ACT inhaler     amLODIPine (NORVASC) 5 MG tablet     aspirin 81 MG EC tablet     atorvastatin  (LIPITOR) 40 MG tablet     augmented betamethasone dipropionate (DIPROLENE AF) 0.05 % external cream     betamethasone valerate (VALISONE) 0.1 % external cream     carboxymethylcellulose PF (REFRESH LIQUIGEL) 1 % ophthalmic gel     ipratropium - albuterol 0.5 mg/2.5 mg/3 mL (DUONEB) 0.5-2.5 (3) MG/3ML neb solution     polyethylene glycol (MIRALAX) 17 GM/Dose powder     tiotropium (SPIRIVA RESPIMAT) 2.5 MCG/ACT inhaler     No current facility-administered medications for this visit.      Past Medical History:   Patient Active Problem List   Diagnosis     Fatigue     Hyponatremia     Acute Lymphoma     Keloid scar     Abdominal Pain     Benign Essential Hypertension     Palpitations     Chronic Obstructive Pulmonary Disease     Wheezing (Symptom)     Constipation     Dermatitis     Lung mass     Muscle weakness (generalized)     Pneumonia     Prolapse of vaginal wall with midline cystocele     COPD exacerbation (H)     E. coli UTI     Hypertensive disorder     Hyponatremia     Malignant lymphoma, large cell, diffuse (H)     Prediabetes     Tuberculosis     Cystocele with incomplete uterovaginal prolapse     History reviewed. No pertinent past medical history.        Again, thank you for allowing me to participate in the care of your patient.        Sincerely,        Arlyn Joiner, JUDE CNP

## 2022-07-29 NOTE — PROGRESS NOTES
[facility] Dermatology Note  Encounter Date: Jul 29, 2022  Office Visit     Reviewed patients past medical history and pertinent chart review prior to patients visit today.     Dermatology Problem List:  Keloid scarring, betamethasone augmented formula  0.05% ointment PRN, ILK 5/27/2022 and 7/29/2022    ____________________________________________    Assessment & Plan:     # Keloid scars    -Kenalog intralesional injection procedure note: After verbal consent and discussion of risks including but not limited to atrophy, pain, and bruising, time out was performed, 4 total cc of Kenalog 40 mg/cc was injected into all site(s) on all bilateral flanks and bilateral posterior shoulders and central chest only in the thickest areas of scars.  The patient tolerated the procedure well and left the Dermatology clinic in good condition.   Ice packs given to take home.    -Continue betamethasone augmented formula  0.05% ointment to be applied only to thickened hypertrophic areas of skin twice daily as needed for symptom relief    -Try Sarna original lotion kept in the refrigerator when these get painful and see if that helps temporarily with the pain.    -Patient had referral to Dr. Dalton for consultation for further treatment for September but it got canceled likely by her daughter she thinks.  She would like to schedule another consultation with him.  Consultation made for February 2023 which is his next available to discuss further treatment options.     Arlyn Joiner, APRN CNP on 7/29/2022 at 4:16 PM   _______________________________________    CC: keloid scars  HPI:  Ms. Rhett Alvarado is a(n) 77 year old female who presents today as a follow-up for hypertrophic keloid scarring on the trunk.  She had a lot of improvement after her first injection at the end of May but then when she came back in June she had a cough and was not feeling all that well and did not want injections.  She thought the  improvement would stay but she has gotten a lot worse over the last couple of weeks and it is been very painful and uncomfortable.  She is ready for more injections.    History of keloids: I received outside records from 2016 and 2018 when she has had Kenalog injections to these lesions.  It sounds like she had this injected about 1 month ago as well at an outside clinic but I do not have those records.  Patient says that she is having a lot of pain, discomfort, and itching.  It keeps her up at night sometimes.  She would like to have treatment of these.  She says that they started because of her bra rubbing on her skin so these are under both arms on the flanks where her underwire would lay and on both posterior shoulders where the bra strap lays.      She comes to appointments with her daughter or another young female who she says helps take her to appointments and translate for in ong     Patient is otherwise feeling well, without additional skin concerns.      Physical Exam:  Vitals: There were no vitals taken for this visit.  SKIN: Waist-up skin, which includes the head/face, neck, both arms, chest, back, abdomen, digits and/or nails was examined.  -4 very large irregular shaped thick hyperkeratotic firm pink scars on the bilateral shoulders and bilateral flanks extending under the breasts.  She also has 1 on the central chest where she had a port previously    - No other lesions of concern on areas examined.     Medications:  Current Outpatient Medications   Medication     acetaminophen (TYLENOL) 325 MG tablet     albuterol (PROAIR HFA/PROVENTIL HFA/VENTOLIN HFA) 108 (90 Base) MCG/ACT inhaler     amLODIPine (NORVASC) 5 MG tablet     aspirin 81 MG EC tablet     atorvastatin (LIPITOR) 40 MG tablet     augmented betamethasone dipropionate (DIPROLENE AF) 0.05 % external cream     betamethasone valerate (VALISONE) 0.1 % external cream     carboxymethylcellulose PF (REFRESH LIQUIGEL) 1 % ophthalmic gel      ipratropium - albuterol 0.5 mg/2.5 mg/3 mL (DUONEB) 0.5-2.5 (3) MG/3ML neb solution     polyethylene glycol (MIRALAX) 17 GM/Dose powder     tiotropium (SPIRIVA RESPIMAT) 2.5 MCG/ACT inhaler     No current facility-administered medications for this visit.      Past Medical History:   Patient Active Problem List   Diagnosis     Fatigue     Hyponatremia     Acute Lymphoma     Keloid scar     Abdominal Pain     Benign Essential Hypertension     Palpitations     Chronic Obstructive Pulmonary Disease     Wheezing (Symptom)     Constipation     Dermatitis     Lung mass     Muscle weakness (generalized)     Pneumonia     Prolapse of vaginal wall with midline cystocele     COPD exacerbation (H)     E. coli UTI     Hypertensive disorder     Hyponatremia     Malignant lymphoma, large cell, diffuse (H)     Prediabetes     Tuberculosis     Cystocele with incomplete uterovaginal prolapse     History reviewed. No pertinent past medical history.

## 2022-08-08 ENCOUNTER — OFFICE VISIT (OUTPATIENT)
Dept: FAMILY MEDICINE | Facility: CLINIC | Age: 77
End: 2022-08-08
Payer: COMMERCIAL

## 2022-08-08 VITALS
TEMPERATURE: 98.3 F | HEIGHT: 57 IN | HEART RATE: 72 BPM | RESPIRATION RATE: 22 BRPM | WEIGHT: 141 LBS | OXYGEN SATURATION: 97 % | BODY MASS INDEX: 30.42 KG/M2 | SYSTOLIC BLOOD PRESSURE: 113 MMHG | DIASTOLIC BLOOD PRESSURE: 76 MMHG

## 2022-08-08 DIAGNOSIS — I10 ESSENTIAL HYPERTENSION, BENIGN: ICD-10-CM

## 2022-08-08 DIAGNOSIS — E78.5 HYPERLIPIDEMIA, UNSPECIFIED HYPERLIPIDEMIA TYPE: ICD-10-CM

## 2022-08-08 DIAGNOSIS — Z00.00 WELLNESS EXAMINATION: Primary | ICD-10-CM

## 2022-08-08 DIAGNOSIS — Z00.00 ENCOUNTER FOR MEDICARE ANNUAL WELLNESS EXAM: Primary | ICD-10-CM

## 2022-08-08 DIAGNOSIS — Z13.820 SCREENING FOR OSTEOPOROSIS: ICD-10-CM

## 2022-08-08 DIAGNOSIS — Z78.0 ASYMPTOMATIC MENOPAUSAL STATE: ICD-10-CM

## 2022-08-08 LAB
CHOLEST SERPL-MCNC: 178 MG/DL
HDLC SERPL-MCNC: 61 MG/DL
LDLC SERPL CALC-MCNC: 91 MG/DL
NONHDLC SERPL-MCNC: 117 MG/DL
TRIGL SERPL-MCNC: 128 MG/DL

## 2022-08-08 PROCEDURE — 80061 LIPID PANEL: CPT | Performed by: STUDENT IN AN ORGANIZED HEALTH CARE EDUCATION/TRAINING PROGRAM

## 2022-08-08 PROCEDURE — 36415 COLL VENOUS BLD VENIPUNCTURE: CPT | Performed by: STUDENT IN AN ORGANIZED HEALTH CARE EDUCATION/TRAINING PROGRAM

## 2022-08-08 PROCEDURE — G0439 PPPS, SUBSEQ VISIT: HCPCS | Mod: GC | Performed by: STUDENT IN AN ORGANIZED HEALTH CARE EDUCATION/TRAINING PROGRAM

## 2022-08-08 PROCEDURE — 90677 PCV20 VACCINE IM: CPT | Performed by: STUDENT IN AN ORGANIZED HEALTH CARE EDUCATION/TRAINING PROGRAM

## 2022-08-08 PROCEDURE — G0009 ADMIN PNEUMOCOCCAL VACCINE: HCPCS | Performed by: STUDENT IN AN ORGANIZED HEALTH CARE EDUCATION/TRAINING PROGRAM

## 2022-08-08 PROCEDURE — 99207 PR NO CHARGE NURSE ONLY: CPT

## 2022-08-08 RX ORDER — AMLODIPINE BESYLATE 5 MG/1
5 TABLET ORAL DAILY
Qty: 90 TABLET | Refills: 3 | Status: SHIPPED | OUTPATIENT
Start: 2022-08-08 | End: 2023-08-01

## 2022-08-08 NOTE — PROGRESS NOTES
"Medicare Wellness Visit  Health Risk Assessment           Health Risk Assessment / Review of Systems     Constitutional: Any fevers or night sweats? No     Eyes:  Vision problems   No, left eye vision has been ok, dependant on left eye only. Unable to see, \"pitch black vision\" per patient report. Has been seen and received injection treatment in [place]. Receiving an injection into the right eye every 3 months, totaling about 15- 20 injections.     Hearing Do you feel you have hearing loss?  No     Cardiovascular: Any chest pain, fast or irregular heart beat, calf pain with walking?     No           Respiratory:   Any breathing problems or cough?    YES - no cough, hx COPD, wheezing more this afternoon than this morning. This morning, Rhett was exposed by accident to her 's cigarette smoking. He was smoking outside but she had opened the door for her daughter in law this morning upon her arrival to the home.    Gastrointestinal: Any stomach or stool problems?   No      Genitourinary: Do you have difficulty controlling urination?   No, occasionally would have urinary leakage, especially at night time, wears briefs to prevent accidents.    Muscles and Joints: Any joint stiffness or soreness?   No     Skin: Any concerning lesions or moles?    YES - skin discoloration on neck/ face, rough texture, present x 2-3 years.    Nervous System: Any loss of strength or feeling, numbness or tingling, shaking, dizziness, or headache?  No, foot injury at age 7-8 years old, blade punctured through right foot, healed and right foot is smaller than left. Rhett voiced in the past, she could never wear high heels because of difference in feet size.    Mental Health: Any depression, anxiety or problems sleeping?    No     Cognition: Do you have any problems with your memory?  No            Medical Care     What other specialists or organizations are involved in your medical care?  none  Patient Care Team       Relationship Specialty " Notifications Start End    Manoj Burch MD PCP - General Student in organized health care education/training program  2/14/22     Phone: [phone] Fax: [phone]         [facility] [facility] [address]    Manoj Burch MD Assigned PCP   1/27/22     Phone: [phone] Fax: [phone] [address] [facility] [address]    Matthew Diop MD Referring Physician   3/19/22     Referred to Dermatology    Phone: [phone] Fax: [phone]         [facility] [facility] [address]    Arlyn Joiner APRN CNP Nurse Practitioner Dermatology  3/19/22     Phone: [phone] Fax: [phone] [address] [facility] [address]    Mickie Jean MD MD Endocrinology, Diabetes, and Metabolism  6/3/22     Phone: [phone] Fax: [phone]         [address] [facility] [address]    Arlyn Joiner APRN CNP Assigned Surgical Provider   6/4/22     Phone: [phone] Fax: [phone]         [address] [facility] [address]          Have you been to the ER or overnight in the hospital in the last year?   YES -5/2022 fall         Social History / Home Safety       Marital Status:  Who lives in your household? Daughter and her family    Do you feel threatened or controlled by a partner, ex-partner or anyone in your life? No     Has anyone hurt you physically, for example by pushing, hitting, slapping or kicking you   or forcing you to have sex? No          Does your home have any of the following safety concerns; loose rugs in the hallway,  bathrooms with no grab bars by the tub or toilet, stairs with no handrails or poorly lit areas?   YES - no grab bars in the bathroom, uses shower chair    Do you need help with dressing yourself, bathing, eating or getting around your home?   YES - daughter helps with above activities.    Do you need help with the phone, transportation, shopping, preparing meals, housework, laundry,  medications or managing money? YES - daughter helps with above activities.      Risk Behaviors and Healthy Habits     History   Smoking Status     Passive Smoke Exposure - Never Smoker   Smokeless Tobacco     Never Used     How many servings of fruits and vegetables do you eat a day? 3-4 servings a day    Exercise: 3 days a week goes to adult day center, there would do stretching exercises,walk 4 rounds around center, physically limited d/t poor vision- fear of falling and is at risk for falls.     Do you frequently drive without a seatbelt? No     Do you use tobacco?  No, some 2nd hand exposure occasionally to 's cigarette use.    Do you use any other drugs? No         Do you use alcohol?No      Frailty Assessment            Have you lost 10 or more pounds unintentionally in the previous year? No     How difficult is walking from one room to the other on the same level?mildly to moderately due to poor vision.       Is it difficult to lift or carry something as heavy as 10 pounds?mildly to moderately      Compared with most (men/women) your age, would you say  that you are more active, less active, or about the same? less        FALL RISK ASSESSMENT 8/8/2022   Fallen 2 or more times in the past year? No   Any fall with injury in the past year? No   Timed Up and Go Test/Seconds (13.5 is a fall risk; contact physician) 17         Advance Directives:   Discussed with patient and family as appropriate. Has patient  completed advance directives and/or a living will? no      Leatha Mao RN

## 2022-08-08 NOTE — PROGRESS NOTES
SUBJECTIVE:   Rhett Alvarado is a 77 year old female who presents for Preventive Visit.      Patient has been advised of split billing requirements and indicates understanding: Yes    HPI  Do you feel safe in your environment? Yes    Have you ever done Advance Care Planning? (For example, a Health Directive, POLST, or a discussion with a medical provider or your loved ones about your wishes): No, will discuss with loved ones.    Fall risk  Fallen 2 or more times in the past year?: No  Any fall with injury in the past year?: No  Timed Up and Go Test (>13.5 is fall risk; contact physician) : 17 (walking with caution secondary to poor eyesight,can only see in left eye.)    Cognitive Screening Short and long term memory in tact. Difficult to complete true mini-cog 2/2 language barrier.    Do you have sleep apnea, excessive snoring or daytime drowsiness?: no    Reviewed and updated as needed this visit by clinical staff    Allergies    Med Hx  Surg Hx  Fam Hx            Reviewed and updated as needed this visit by Provider                   Social History     Tobacco Use     Smoking status: Passive Smoke Exposure - Never Smoker     Smokeless tobacco: Never Used   Substance Use Topics     Alcohol use: No      If you drink alcohol do you typically have >3 drinks per day or >7 drinks per week? No    Current providers sharing in care for this patient include:    Patient Care Team:  Manoj Burch MD as PCP - General (Student in organized health care education/training program)  Manoj Burch MD as Assigned PCP  Matthew Diop MD as Referring Physician  Arlyn Joiner APRN CNP as Nurse Practitioner (Dermatology)  Mickie Jean MD as MD (Endocrinology, Diabetes, and Metabolism)  Arlyn Joiner APRN CNP as Assigned Surgical Provider    The following health maintenance items are reviewed in Epic and correct as of today:  Health Maintenance Due   Topic Date Due     DEXA  Never done     ADVANCE  "CARE PLANNING  Never done     COPD ACTION PLAN  Never done     ZOSTER IMMUNIZATION (1 of 2) Never done     MEDICARE ANNUAL WELLNESS VISIT  Never done     PHQ-9  08/14/2022     Mammogram Screening - Mammography discussed and declined  Pertinent mammograms are reviewed under the imaging tab.    Review of Systems  Denies 7 point ROS unless otherwise noted.    OBJECTIVE:   /76   Pulse 72   Temp 98.3  F (36.8  C)   Resp 22   Ht 1.45 m (4' 9.09\")   Wt 64 kg (141 lb)   SpO2 97%   BMI 30.42 kg/m   Estimated body mass index is 30.42 kg/m  as calculated from the following:    Height as of this encounter: 1.45 m (4' 9.09\").    Weight as of this encounter: 64 kg (141 lb).  Physical Exam  GENERAL APPEARANCE: healthy, alert and no distress  EYES: PERRL, cataracts  HENT: ear canals normal, nose and mouth without ulcers or lesions, oropharynx clear and oral mucous membranes moist  NECK: no adenopathy, no asymmetry, masses, or scars  RESP: lungs with minimal wheezing throughout  CV: regular rate and rhythm, no murmur, click or rub, no peripheral edema and peripheral pulses strong  ABDOMEN: soft, nontender, no hepatosplenomegaly, no masses and bowel sounds normal  MS: no musculoskeletal defects are noted and gait is age appropriate without ataxia  SKIN: Scattered SKs, no suspicious lesions or rashes  NEURO: Normal strength and tone, sensory exam grossly normal, mentation intact and speech normal  PSYCH: mentation appears normal and affect normal/bright    Diagnostic Test Results:  Labs reviewed in Epic    ASSESSMENT / PLAN:   (J44.9) Chronic obstructive pulmonary disease, unspecified COPD type (H)  (primary encounter diagnosis)  Comment: CAT 8 today. Now correctly taking spiriva respimat 2 puffs nightly. Using albuterol prn, using ~2x a day. Uses duoneb 3x a day and states her pulmonologist told her to schedule them at this point. Used to see pulmonology but hasn't regularly gone lately per patient. Last PFTs " 3/2022.  Plan:   -Continue spiriva respimat 2 puffs nightly  -prn albuterol/duonebs  -Again counseled on inhalers and how to correctly take     (E04.1) Thyroid nodule  Comment: Asymptomatic thyroid nodule w/ ultrasound findings of mid thyroid nodule that meets criteria for tissue sampling; also with left and isthmus nodules meet criteria for twelve-month follow-up. Patient aware of endo appt.  Plan:   -Endo appt for FNA in 12/2022; earliest appt she can get. Discussed appt   -Follow-up thyroid ultrasound in 1 year     (I10) Benign Essential Hypertension  Comment: Has been on amlodipine 5mg daily. Not checking BP at home. /76 today which is what it's been close to at home.  Plan:   -Refilled amlodipine 5mg daily  -Counseled to check BP at home daily    (Z00.00) Encounter for Medicare annual wellness exam  (primary encounter diagnosis)  Comment: Get up and go delayed but patient states this is 2/2 her bad vision. Using cane at home. Lives with son and DIL and feels safe in home. Feels very happy with life lately and says her diseases have been better managed. Due for pneumococcal and shingles shot.  Plan:   -PNEUMOCOCCAL 20 VALENT CONJUGATE (PREVNAR 20)  -Counseled to get shingles shot at outside pharmacy    (E78.5) Hyperlipidemia, unspecified hyperlipidemia type  Comment: Started on lipitor 40mg daily this spring. Due for lipid recheck since starting.  Plan:   -Lipid Profile  -Continue lipitor 40mg daily    (Z13.820) Screening for osteoporosis  (Z78.0) Asymptomatic menopausal state   Comment: Needs DEXA screening and willing to get.   Plan:   -Dexa hip/pelvis/spine        Patient has been advised of split billing requirements and indicates understanding: Yes    COUNSELING:  Reviewed preventive health counseling, as reflected in patient instructions       Regular exercise       Healthy diet/nutrition       Vision screening       Dental care       Fall risk prevention       Osteoporosis prevention/bone  "health    Estimated body mass index is 30.42 kg/m  as calculated from the following:    Height as of this encounter: 1.45 m (4' 9.09\").    Weight as of this encounter: 64 kg (141 lb).    She reports that she is a non-smoker but has been exposed to tobacco smoke. She has never used smokeless tobacco.      Appropriate preventive services were discussed with this patient, including applicable screening as appropriate for cardiovascular disease, diabetes, osteopenia/osteoporosis, and glaucoma.  As appropriate for age/gender, discussed screening for colorectal cancer, prostate cancer, breast cancer, and cervical cancer. Checklist reviewing preventive services available has been given to the patient.    Reviewed patients plan of care and provided an AVS. The Basic Care Plan (routine screening as documented in Health Maintenance) for Rhett meets the Care Plan requirement. This Care Plan has been established and reviewed with the Patient.      Manoj Burch MD  [facility]  "

## 2022-08-08 NOTE — NURSING NOTE
Due to patient being non-English speaking/uses sign language, an  was used for this visit. Only for face-to-face interpretation by an external agency, date and length of interpretation can be found on the scanned worksheet.     name: Juan  Agency: [employer]  Language: Hmong   Telephone number: [phone]  Type of interpretation: Face-to-face, spoken

## 2022-08-08 NOTE — PROGRESS NOTES
Preceptor Attestation:   Patient seen, evaluated and discussed with the resident. I have verified the content of the note, which accurately reflects my assessment of the patient and the plan of care.   Supervising Physician:  Arie Neville MD

## 2022-08-08 NOTE — LETTER
August 11, 2022      Rhett Alvarado  [address] [id number]        Dear ,    We are writing to inform you of your test results.    Your cholesterol levels are improving since starting your cholesterol medication (Lipitor) a few months ago! This is excellent and is good for your health so I'm very happy to see the improvement.   Keep taking your lipitor every day.        Resulted Orders   Lipid Profile   Result Value Ref Range    Cholesterol 178 <200 mg/dL    Triglycerides 128 <150 mg/dL    Direct Measure HDL 61 >=50 mg/dL    LDL Cholesterol Calculated 91 <=100 mg/dL    Non HDL Cholesterol 117 <130 mg/dL    Narrative    Cholesterol  Desirable:  <200 mg/dL    Triglycerides  Normal:  Less than 150 mg/dL  Borderline High:  150-199 mg/dL  High:  200-499 mg/dL  Very High:  Greater than or equal to 500 mg/dL    Direct Measure HDL  Female:  Greater than or equal to 50 mg/dL   Male:  Greater than or equal to 40 mg/dL    LDL Cholesterol  Desirable:  <100mg/dL  Above Desirable:  100-129 mg/dL   Borderline High:  130-159 mg/dL   High:  160-189 mg/dL   Very High:  >= 190 mg/dL    Non HDL Cholesterol  Desirable:  130 mg/dL  Above Desirable:  130-159 mg/dL  Borderline High:  160-189 mg/dL  High:  190-219 mg/dL  Very High:  Greater than or equal to 220 mg/dL       If you have any questions or concerns, please call the clinic at the number listed above.       Sincerely,      Manoj Burch MD

## 2022-08-08 NOTE — PATIENT INSTRUCTIONS
PERSONAL PREVENTIVE SERVICES PLAN - SERVICES     Review these tests with your medical staff then decide which ones you want and take this page home for your reference      SCREENING TESTS     Description   Year of Last Screening   Recommended Today?   Heart disease screening blood tests    Cholesterol level Reducing cholesterol can reduce your risk of heart attacks by 25%.  Screening is recommended yearly if you are at risk of heart disease otherwise every 4-5 years 5/4/22   No; is up to date.   Diabetes screening tests    Hemoglobin A1c blood test   Finding and treating diabetes early can reduce complications.  Screening recommended/covered yearly if you have high blood pressure, high cholesterol, obesity (BMI >30), or a history of high blood glucose tests; or 2 of the following: family history of diabetes, overweight (BMI >25 but <30), age 65 years or older, and a history of diabetes of pregnancy or gave birth to baby weighing more than 9 lbs. 2/9/22  hgbA1c  6.2 Yes; Recommended -DUE for recheck   Hepatitis B screening Finding hepatitis B early can reduce complications.  Screening is recommended for persons with selected risk factors. 1/29/12  negative No: is not indicated today.   Hepatitis C screening Finding hepatitis C early can reduce complications.  Screening is recommended for all persons born from 1945 through 1965 and for those with selected other risk factors.  1/29/12  negative No: is not indicated today.   HIV screening Finding HIV early can reduce complications.  Screening is recommended for persons with risk factors for HIV infection. 1/28/12  negative No: is not indicated today.   Glaucoma screening Early detection of glaucoma can prevent blindness.   Please talk to your eye doctor about this.       SCREENING TESTS     Description   Year of Last Screening   Recommended Today?   Colorectal cancer screening    Fecal occult blood test     Screening colonoscopy Screening for colon cancer has been  shown to reduce death from colon cancer by 25-30%. Screening recommended to start at 50 years and continuing until age 75 years.    No: is not indicated today.   Breast Cancer Screening (women)    Mammogram Mammogram screening for breast cancer has been shown to reduce the risk of dying from breast cancer and prolong life. Screening is recommended every 1-2 years for women aged 50 to 74 years.  12/12/14  negative No: is not indicated today.   Cervical Cancer screening (women)    Pap Cervical pap smears can reduce cervical cancer. Screening is recommended annually if high risk (history of abnormal pap smears) otherwise every 2-3 years, stop screening at 65 years of age if history of normal paps.  No: is not indicated today.   Screening for Osteoporosis:  Bone mass measurements (women)    Dexa Scan Screening and treating Osteoporosis can reduce the risk of hip and spine fractures. Screening is recommended in women 65 years or older and in women and men at risk of osteoporosis.  Yes; Recommended and ordered.   Screening for Lung Cancer     Low-dose CT scanning Screening can reduce mortality in persons aged 55-80 who have smoked at least 30 pack-years and who are either still smoking or have quit in the past 15 years.  No;not indicated for today   Abdominal Aortic Aneurysm (AAA) screening    Ultrasound (US)   An aneurysm treated before rupture is very safe -a ruptured aneurysm can be fatal.  Screening  by US for AAA is limited to patients who meet one of the following criteria:    Men who are 65-75 years old and have smoked more than 100 cigarettes in their lifetime    Anyone with a family history of abdominal aortic aneurysm  No: is not indicated today.     Here are your recommended immunizations.  Take this home for your reference.                                                    IMMUNIZATIONS Description Recommend today?     Influenza (Flu shot) Prevents flu; should get every year No: is not indicated today.   PCV  20 Pneumonia vaccination; you get it once Yes; Recommended   PPSV 23 Second pneumonia vaccination; usually get it 1 year after PCV 13 No; is up to date.   Zoster (Shingles) Prevents shingles; you get it once  (Check with Part D insurance for coverage, must receive at a pharmacy, not clinic) Yes; Recommended    Tetanus Prevents tetanus; once every 10 years No; is up to date.     Hepatitis B  If you have any of the following risk factors you should be immunized for hepatitis B: severe kidney disease, people who live in the same house as a carrier of Hepatitis B virus, people who live in  institutions (e.g. nursing homes or group homes), homosexual men, patients with hemophilia who received Factor VIII or IX concentrates, abusers of illicit injectable drugs No: is not indicated today.      PATIENT INSTRUCTIONS    Yearly exam:     See your health care provider every year in order to review changes in your health, review medicines that you take, and discuss preventive care needs such as immunizations and cancer screening.    Get a flu shot each year.     Advance Directives:    If you have not done so, you are encouraged to complete advance directives and/or a living will.   More information about advance directives can be found at: http://www.[place]med.org/advocacy/Key-Issues/Advance-Directives    Nutrition:     Eat at least 5 servings of fruits and vegetables each day.     Eat whole-grain bread, whole-wheat pasta and brown rice instead of white grains and rice.     Talk to your doctor about Calcium and Vitamin D.     Lifestyle:    Exercise for at least 150 minutes a week (30 minutes a day, 5 days a week). This will help you control your weight and prevent disease.     Limit alcohol to one drink per day.     If you smoke, try to quit - your doctor will be happy to help.     Wear sunscreen to prevent skin cancer.     See your dentist every six months for an exam and cleaning.     See your eye doctor every 1 to 2 years to  screen for conditions such as glaucoma, macular degeneration and cataracts.                            Preventive Health Recommendations    See your health care provider every year to    Review health changes.     Discuss preventive care.      Review your medicines if your doctor has prescribed any.    You no longer need a yearly Pap test unless you've had an abnormal Pap test in the past 10 years. If you have vaginal symptoms, such as bleeding or discharge, be sure to talk with your provider about a Pap test.    Every 1 to 2 years, have a mammogram.  If you are over 69, talk with your health care provider about whether or not you want to continue having screening mammograms.    Every 10 years, have a colonoscopy. Or, have a yearly FIT test (stool test). These exams will check for colon cancer.     Have a cholesterol test every 5 years, or more often if your doctor advises it.     Have a diabetes test (fasting glucose) every three years. If you are at risk for diabetes, you should have this test more often.     At age 65, have a bone density scan (DEXA) to check for osteoporosis (brittle bone disease).    Shots:    Get a flu shot each year.    Get a tetanus shot every 10 years.    Talk to your doctor about your pneumonia vaccines. There are now two you should receive - Pneumovax (PPSV 23) and Prevnar (PCV 13).    Talk to your pharmacist about the shingles vaccine.    Talk to your doctor about the hepatitis B vaccine.    Nutrition:     Eat at least 5 servings of fruits and vegetables each day.    Eat whole-grain bread, whole-wheat pasta and brown rice instead of white grains and rice.    Get adequate Calcium and Vitamin D.     Lifestyle    Exercise at least 150 minutes a week (30 minutes a day, 5 days a week). This will help you control your weight and prevent disease.    Limit alcohol to one drink per day.    No smoking.     Wear sunscreen to prevent skin cancer.     See your dentist twice a year for an exam and  cleaning.    See your eye doctor every 1 to 2 years to screen for conditions such as glaucoma, macular degeneration and cataracts.    Personalized Prevention Plan  You are due for the preventive services outlined below.  Your care team is available to assist you in scheduling these services.  If you have already completed any of these items, please share that information with your care team to update in your medical record.  Health Maintenance   Topic Date Due     DEXA  Never done     ADVANCE CARE PLANNING  Never done     COPD ACTION PLAN  Never done     ZOSTER IMMUNIZATION (1 of 2) Never done     MEDICARE ANNUAL WELLNESS VISIT  Never done     PHQ-9  08/14/2022     INFLUENZA VACCINE (1) 09/01/2022     FALL RISK ASSESSMENT  08/08/2023     LIPID  05/04/2027     DTAP/TDAP/TD IMMUNIZATION (3 - Td or Tdap) 05/04/2032     SPIROMETRY  Completed     HEPATITIS C SCREENING  Completed     Pneumococcal Vaccine: 65+ Years  Completed     COVID-19 Vaccine  Completed     IPV IMMUNIZATION  Aged Out     MENINGITIS IMMUNIZATION  Aged Out     HEPATITIS B IMMUNIZATION  Aged Out

## 2022-08-22 ENCOUNTER — MEDICAL CORRESPONDENCE (OUTPATIENT)
Dept: HEALTH INFORMATION MANAGEMENT | Facility: CLINIC | Age: 77
End: 2022-08-22

## 2022-08-25 ENCOUNTER — OFFICE VISIT (OUTPATIENT)
Dept: DERMATOLOGY | Facility: CLINIC | Age: 77
End: 2022-08-25
Payer: COMMERCIAL

## 2022-08-25 DIAGNOSIS — L91.0 KELOID SCAR: Primary | ICD-10-CM

## 2022-08-25 PROCEDURE — 11901 INJECT SKIN LESIONS >7: CPT | Performed by: NURSE PRACTITIONER

## 2022-08-25 RX ORDER — TRIAMCINOLONE ACETONIDE 40 MG/ML
40 INJECTION, SUSPENSION INTRA-ARTICULAR; INTRAMUSCULAR ONCE
Status: COMPLETED | OUTPATIENT
Start: 2022-08-25 | End: 2022-08-25

## 2022-08-25 RX ADMIN — TRIAMCINOLONE ACETONIDE 120 MG: 40 INJECTION, SUSPENSION INTRA-ARTICULAR; INTRAMUSCULAR at 16:34

## 2022-08-25 ASSESSMENT — PAIN SCALES - GENERAL: PAINLEVEL: MILD PAIN (2)

## 2022-08-25 NOTE — PROGRESS NOTES
[facility] Dermatology Note  Encounter Date: Aug 25, 2022  Office Visit     Reviewed patients past medical history and pertinent chart review prior to patients visit today.     Dermatology Problem List:  Keloid scarring, betamethasone augmented formula  0.05% ointment PRN, ILK 5/27/2022 and 7/29/2022 and 08/25/22     ____________________________________________    Assessment & Plan:     # Keloid scars significantly improved    -Kenalog intralesional injection procedure note: After verbal consent and discussion of risks including but not limited to atrophy, pain, and bruising, time out was performed, 2.8 total cc of Kenalog 40 mg/cc was injected into all site(s) on all bilateral flanks and bilateral posterior shoulders and central chest only in the thickest areas of scars (>20 injection sites).  The patient tolerated the procedure well and left the Dermatology clinic in good condition.   Ice packs given to take home.    -Continue betamethasone augmented formula  0.05% ointment to be applied only to thickened hypertrophic areas of skin twice daily as needed for symptom relief    Follow-up in 1 month for further injections    Arlyn Joiner, APRN CNP on 7/29/2022 at 4:16 PM   _______________________________________    CC: keloid scars  HPI:  Ms. Rhett Alvarado is a(n) 77 year old female who presents today accompanied by her daughter  as a follow-up for hypertrophic keloid scarring on the trunk.  She had a lot of improvement after her first injection at the end of May but then when she came back in June she had a cough and was not feeling all that well and did not want injections.  She thought the improvement would stay but at her last visit she has gotten a lot wors and it had been very painful and uncomfortable.  We did many injections at her last visit and she is very happy with the improvement she has gotten since then.  She feels like each of the scars of gotten much thinner and less symptomatic.   The ones on the chest are completely asymptomatic now and she does not want injections there.  She does still want injections on the back of the shoulders and sides of the breasts.    History of keloids: I received outside records from 2016 and 2018 when she has had Kenalog injections to these lesions.  It sounds like she had this injected about 1 month ago as well at an outside clinic but I do not have those records.  Patient says that she is having a lot of pain, discomfort, and itching.  It keeps her up at night sometimes.  She would like to have treatment of these.  She says that they started because of her bra rubbing on her skin so these are under both arms on the flanks where her underwire would lay and on both posterior shoulders where the bra strap lays.      In the past she comes to appointments with her daughter or another young female who she says helps take her to appointments and translate for in [employer].  Today she had a iPad  and her daughter was with her.    Patient is otherwise feeling well, without additional skin concerns.      Physical Exam:  Vitals: There were no vitals taken for this visit.  SKIN: Waist-up skin, which includes the head/face, neck, both arms, chest, back, abdomen, digits and/or nails was examined.  -4 very large irregular shaped thick hyperkeratotic firm pink scars on the bilateral shoulders and bilateral flanks extending under the breasts, these have gotten less thickened and less firm since her last visit.  She also has 1 on the central chest where she had a port previously but this is mostly flattened and asymptomatic.    - No other lesions of concern on areas examined.     Medications:  Current Outpatient Medications   Medication     augmented betamethasone dipropionate (DIPROLENE AF) 0.05 % external cream     betamethasone valerate (VALISONE) 0.1 % external cream     acetaminophen (TYLENOL) 325 MG tablet     albuterol (PROAIR HFA/PROVENTIL HFA/VENTOLIN HFA) 108  (90 Base) MCG/ACT inhaler     amLODIPine (NORVASC) 5 MG tablet     aspirin 81 MG EC tablet     atorvastatin (LIPITOR) 40 MG tablet     carboxymethylcellulose PF (REFRESH LIQUIGEL) 1 % ophthalmic gel     ipratropium - albuterol 0.5 mg/2.5 mg/3 mL (DUONEB) 0.5-2.5 (3) MG/3ML neb solution     polyethylene glycol (MIRALAX) 17 GM/Dose powder     tiotropium (SPIRIVA RESPIMAT) 2.5 MCG/ACT inhaler     No current facility-administered medications for this visit.      Past Medical History:   Patient Active Problem List   Diagnosis     Fatigue     Hyponatremia     Acute Lymphoma     Keloid scar     Abdominal Pain     Benign Essential Hypertension     Palpitations     Chronic Obstructive Pulmonary Disease     Wheezing (Symptom)     Constipation     Dermatitis     Lung mass     Muscle weakness (generalized)     Pneumonia     Prolapse of vaginal wall with midline cystocele     COPD exacerbation (H)     E. coli UTI     Hypertensive disorder     Hyponatremia     Malignant lymphoma, large cell, diffuse (H)     Prediabetes     Tuberculosis     Cystocele with incomplete uterovaginal prolapse     No past medical history on file.

## 2022-08-25 NOTE — LETTER
8/25/2022         RE: Rhett Alvarado  [address] [facility] [facility] [id number]        Dear Colleague,    Thank you for referring your patient, Rhett Alvarado, to the [facility]. Please see a copy of my visit note below.    [facility] Dermatology Note  Encounter Date: Aug 25, 2022  Office Visit     Reviewed patients past medical history and pertinent chart review prior to patients visit today.     Dermatology Problem List:  Keloid scarring, betamethasone augmented formula  0.05% ointment PRN, ILK 5/27/2022 and 7/29/2022 and 08/25/22     ____________________________________________    Assessment & Plan:     # Keloid scars significantly improved    -Kenalog intralesional injection procedure note: After verbal consent and discussion of risks including but not limited to atrophy, pain, and bruising, time out was performed, 2.8 total cc of Kenalog 40 mg/cc was injected into all site(s) on all bilateral flanks and bilateral posterior shoulders and central chest only in the thickest areas of scars (>20 injection sites).  The patient tolerated the procedure well and left the Dermatology clinic in good condition.   Ice packs given to take home.    -Continue betamethasone augmented formula  0.05% ointment to be applied only to thickened hypertrophic areas of skin twice daily as needed for symptom relief    Follow-up in 1 month for further injections    Arlyn Joiner, APRN CNP on 7/29/2022 at 4:16 PM   _______________________________________    CC: keloid scars  HPI:  Ms. Rhett Alvarado is a(n) 77 year old female who presents today accompanied by her daughter  as a follow-up for hypertrophic keloid scarring on the trunk.  She had a lot of improvement after her first injection at the end of May but then when she came back in June she had a cough and was not feeling all that well and did not want injections.  She thought the improvement would stay but at her last visit she has gotten a lot  wors and it had been very painful and uncomfortable.  We did many injections at her last visit and she is very happy with the improvement she has gotten since then.  She feels like each of the scars of gotten much thinner and less symptomatic.  The ones on the chest are completely asymptomatic now and she does not want injections there.  She does still want injections on the back of the shoulders and sides of the breasts.    History of keloids: I received outside records from 2016 and 2018 when she has had Kenalog injections to these lesions.  It sounds like she had this injected about 1 month ago as well at an outside clinic but I do not have those records.  Patient says that she is having a lot of pain, discomfort, and itching.  It keeps her up at night sometimes.  She would like to have treatment of these.  She says that they started because of her bra rubbing on her skin so these are under both arms on the flanks where her underwire would lay and on both posterior shoulders where the bra strap lays.      In the past she comes to appointments with her daughter or another young female who she says helps take her to appointments and translate for in [employer].  Today she had a iPad  and her daughter was with her.    Patient is otherwise feeling well, without additional skin concerns.      Physical Exam:  Vitals: There were no vitals taken for this visit.  SKIN: Waist-up skin, which includes the head/face, neck, both arms, chest, back, abdomen, digits and/or nails was examined.  -4 very large irregular shaped thick hyperkeratotic firm pink scars on the bilateral shoulders and bilateral flanks extending under the breasts, these have gotten less thickened and less firm since her last visit.  She also has 1 on the central chest where she had a port previously but this is mostly flattened and asymptomatic.    - No other lesions of concern on areas examined.     Medications:  Current Outpatient Medications    Medication     augmented betamethasone dipropionate (DIPROLENE AF) 0.05 % external cream     betamethasone valerate (VALISONE) 0.1 % external cream     acetaminophen (TYLENOL) 325 MG tablet     albuterol (PROAIR HFA/PROVENTIL HFA/VENTOLIN HFA) 108 (90 Base) MCG/ACT inhaler     amLODIPine (NORVASC) 5 MG tablet     aspirin 81 MG EC tablet     atorvastatin (LIPITOR) 40 MG tablet     carboxymethylcellulose PF (REFRESH LIQUIGEL) 1 % ophthalmic gel     ipratropium - albuterol 0.5 mg/2.5 mg/3 mL (DUONEB) 0.5-2.5 (3) MG/3ML neb solution     polyethylene glycol (MIRALAX) 17 GM/Dose powder     tiotropium (SPIRIVA RESPIMAT) 2.5 MCG/ACT inhaler     No current facility-administered medications for this visit.      Past Medical History:   Patient Active Problem List   Diagnosis     Fatigue     Hyponatremia     Acute Lymphoma     Keloid scar     Abdominal Pain     Benign Essential Hypertension     Palpitations     Chronic Obstructive Pulmonary Disease     Wheezing (Symptom)     Constipation     Dermatitis     Lung mass     Muscle weakness (generalized)     Pneumonia     Prolapse of vaginal wall with midline cystocele     COPD exacerbation (H)     E. coli UTI     Hypertensive disorder     Hyponatremia     Malignant lymphoma, large cell, diffuse (H)     Prediabetes     Tuberculosis     Cystocele with incomplete uterovaginal prolapse     No past medical history on file.        Again, thank you for allowing me to participate in the care of your patient.        Sincerely,        Arlyn Joiner, JUDE CNP

## 2022-09-16 ENCOUNTER — OFFICE VISIT (OUTPATIENT)
Dept: DERMATOLOGY | Facility: CLINIC | Age: 77
End: 2022-09-16
Payer: COMMERCIAL

## 2022-09-16 DIAGNOSIS — L91.0 KELOID SCAR: Primary | ICD-10-CM

## 2022-09-16 PROCEDURE — 11901 INJECT SKIN LESIONS >7: CPT | Performed by: NURSE PRACTITIONER

## 2022-09-16 RX ORDER — TRIAMCINOLONE ACETONIDE 40 MG/ML
3 INJECTION, SUSPENSION INTRA-ARTICULAR; INTRAMUSCULAR ONCE
Status: COMPLETED | OUTPATIENT
Start: 2022-09-16 | End: 2022-09-16

## 2022-09-16 RX ADMIN — TRIAMCINOLONE ACETONIDE 120 MG: 40 INJECTION, SUSPENSION INTRA-ARTICULAR; INTRAMUSCULAR at 16:59

## 2022-09-16 ASSESSMENT — PAIN SCALES - GENERAL: PAINLEVEL: MILD PAIN (2)

## 2022-09-16 NOTE — PROGRESS NOTES
[facility] Dermatology Note  Encounter Date: Sep 16, 2022  Office Visit     Reviewed patients past medical history and pertinent chart review prior to patients visit today.     Dermatology Problem List:  Keloid scarring, betamethasone augmented formula  0.05% ointment PRN, ILK 5/27/2022 and 7/29/2022 and 08/25/22     ____________________________________________    Assessment & Plan:     # Keloid scars significantly improved    -Kenalog intralesional injection procedure note: After verbal consent and discussion of risks including but not limited to atrophy, pain, and bruising, time out was performed, 3.0 total cc of Kenalog 40 mg/cc was injected into all site(s) on all bilateral flanks and bilateral posterior shoulders and central chest only in the thickest areas of scars (>20 injection sites).  The patient tolerated the procedure well and left the Dermatology clinic in good condition.   Ice packs given to take home.    -Continue betamethasone augmented formula  0.05% ointment to be applied only to thickened hypertrophic areas of skin twice daily as needed for symptom relief    Follow-up in 1 month for further injections    Arlyn Joiner, JUDE CNP on 7/29/2022 at 4:16 PM   _______________________________________    CC: keloid scars  HPI:  Ms. Rhett Alvarado is a(n) 77 year old female who presents today accompanied by her female friend who is interpreting for her, as a follow-up for hypertrophic keloid scarring on the trunk.  She has had a lot of improvement and symptoms are very much improved.  She still has mild itching under both breasts and on the left shoulder.      History of keloids: I received outside records from 2016 and 2018 when she has had Kenalog injections to these lesions.  It sounds like she had this injected about 1 month ago as well at an outside clinic but I do not have those records.  Patient says that she is having a lot of pain, discomfort, and itching.  It keeps her up at  night sometimes.  She would like to have treatment of these.  She says that they started because of her bra rubbing on her skin so these are under both arms on the flanks where her underwire would lay and on both posterior shoulders where the bra strap lays.      She had a lot of improvement after her first injection at the end of May but then when she came back in June she had a cough and was not feeling all that well and did not want injections.  She thought the improvement would stay but at her last visit she has gotten a lot wors and it had been very painful and uncomfortable.  We did many injections at her last visit and she is very happy with the improvement she has gotten since then.  She feels like each of the scars of gotten much thinner and less symptomatic.  The ones on the chest are completely asymptomatic now and she does not want injections there.  She does still want injections on the back of the shoulders and sides of the breasts.    Physical Exam:  Vitals: There were no vitals taken for this visit.  SKIN: Waist-up skin, which includes the head/face, neck, both arms, chest, back, abdomen, digits and/or nails was examined.  -4 very large irregular shaped thick hyperkeratotic firm pink scars on the bilateral shoulders and bilateral flanks extending under the breasts, these have gotten less thickened and less firm since her last visit.     - No other lesions of concern on areas examined.     Medications:  Current Outpatient Medications   Medication     acetaminophen (TYLENOL) 325 MG tablet     albuterol (PROAIR HFA/PROVENTIL HFA/VENTOLIN HFA) 108 (90 Base) MCG/ACT inhaler     amLODIPine (NORVASC) 5 MG tablet     aspirin 81 MG EC tablet     atorvastatin (LIPITOR) 40 MG tablet     augmented betamethasone dipropionate (DIPROLENE AF) 0.05 % external cream     betamethasone valerate (VALISONE) 0.1 % external cream     carboxymethylcellulose PF (REFRESH LIQUIGEL) 1 % ophthalmic gel     ipratropium - albuterol  0.5 mg/2.5 mg/3 mL (DUONEB) 0.5-2.5 (3) MG/3ML neb solution     polyethylene glycol (MIRALAX) 17 GM/Dose powder     tiotropium (SPIRIVA RESPIMAT) 2.5 MCG/ACT inhaler     Current Facility-Administered Medications   Medication     triamcinolone (KENALOG-40) injection 120 mg      Past Medical History:   Patient Active Problem List   Diagnosis     Fatigue     Hyponatremia     Acute Lymphoma     Keloid scar     Abdominal Pain     Benign Essential Hypertension     Palpitations     Chronic Obstructive Pulmonary Disease     Wheezing (Symptom)     Constipation     Dermatitis     Lung mass     Muscle weakness (generalized)     Pneumonia     Prolapse of vaginal wall with midline cystocele     COPD exacerbation (H)     E. coli UTI     Hypertensive disorder     Hyponatremia     Malignant lymphoma, large cell, diffuse (H)     Prediabetes     Tuberculosis     Cystocele with incomplete uterovaginal prolapse     No past medical history on file.

## 2022-09-16 NOTE — LETTER
9/16/2022         RE: Rhett Alvarado  [address] [place] [address]        Dear Colleague,    Thank you for referring your patient, Rhett Alvarado, to the [facility]. Please see a copy of my visit note below.    [facility] Dermatology Note  Encounter Date: Sep 16, 2022  Office Visit     Reviewed patients past medical history and pertinent chart review prior to patients visit today.     Dermatology Problem List:  Keloid scarring, betamethasone augmented formula  0.05% ointment PRN, ILK 5/27/2022 and 7/29/2022 and 08/25/22     ____________________________________________    Assessment & Plan:     # Keloid scars significantly improved    -Kenalog intralesional injection procedure note: After verbal consent and discussion of risks including but not limited to atrophy, pain, and bruising, time out was performed, 3.0 total cc of Kenalog 40 mg/cc was injected into all site(s) on all bilateral flanks and bilateral posterior shoulders and central chest only in the thickest areas of scars (>20 injection sites).  The patient tolerated the procedure well and left the Dermatology clinic in good condition.   Ice packs given to take home.    -Continue betamethasone augmented formula  0.05% ointment to be applied only to thickened hypertrophic areas of skin twice daily as needed for symptom relief    Follow-up in 1 month for further injections    Arlyn Joiner, APRN CNP on 7/29/2022 at 4:16 PM   _______________________________________    CC: keloid scars  HPI:  Ms. Rhett Alvarado is a(n) 77 year old female who presents today accompanied by her female friend who is interpreting for her, as a follow-up for hypertrophic keloid scarring on the trunk.  She has had a lot of improvement and symptoms are very much improved.  She still has mild itching under both breasts and on the left shoulder.      History of keloids: I received outside records from 2016 and 2018 when she has had Kenalog  injections to these lesions.  It sounds like she had this injected about 1 month ago as well at an outside clinic but I do not have those records.  Patient says that she is having a lot of pain, discomfort, and itching.  It keeps her up at night sometimes.  She would like to have treatment of these.  She says that they started because of her bra rubbing on her skin so these are under both arms on the flanks where her underwire would lay and on both posterior shoulders where the bra strap lays.      She had a lot of improvement after her first injection at the end of May but then when she came back in June she had a cough and was not feeling all that well and did not want injections.  She thought the improvement would stay but at her last visit she has gotten a lot wors and it had been very painful and uncomfortable.  We did many injections at her last visit and she is very happy with the improvement she has gotten since then.  She feels like each of the scars of gotten much thinner and less symptomatic.  The ones on the chest are completely asymptomatic now and she does not want injections there.  She does still want injections on the back of the shoulders and sides of the breasts.    Physical Exam:  Vitals: There were no vitals taken for this visit.  SKIN: Waist-up skin, which includes the head/face, neck, both arms, chest, back, abdomen, digits and/or nails was examined.  -4 very large irregular shaped thick hyperkeratotic firm pink scars on the bilateral shoulders and bilateral flanks extending under the breasts, these have gotten less thickened and less firm since her last visit.     - No other lesions of concern on areas examined.     Medications:  Current Outpatient Medications   Medication     acetaminophen (TYLENOL) 325 MG tablet     albuterol (PROAIR HFA/PROVENTIL HFA/VENTOLIN HFA) 108 (90 Base) MCG/ACT inhaler     amLODIPine (NORVASC) 5 MG tablet     aspirin 81 MG EC tablet     atorvastatin (LIPITOR) 40 MG  tablet     augmented betamethasone dipropionate (DIPROLENE AF) 0.05 % external cream     betamethasone valerate (VALISONE) 0.1 % external cream     carboxymethylcellulose PF (REFRESH LIQUIGEL) 1 % ophthalmic gel     ipratropium - albuterol 0.5 mg/2.5 mg/3 mL (DUONEB) 0.5-2.5 (3) MG/3ML neb solution     polyethylene glycol (MIRALAX) 17 GM/Dose powder     tiotropium (SPIRIVA RESPIMAT) 2.5 MCG/ACT inhaler     Current Facility-Administered Medications   Medication     triamcinolone (KENALOG-40) injection 120 mg      Past Medical History:   Patient Active Problem List   Diagnosis     Fatigue     Hyponatremia     Acute Lymphoma     Keloid scar     Abdominal Pain     Benign Essential Hypertension     Palpitations     Chronic Obstructive Pulmonary Disease     Wheezing (Symptom)     Constipation     Dermatitis     Lung mass     Muscle weakness (generalized)     Pneumonia     Prolapse of vaginal wall with midline cystocele     COPD exacerbation (H)     E. coli UTI     Hypertensive disorder     Hyponatremia     Malignant lymphoma, large cell, diffuse (H)     Prediabetes     Tuberculosis     Cystocele with incomplete uterovaginal prolapse     No past medical history on file.        Again, thank you for allowing me to participate in the care of your patient.        Sincerely,        Arlyn Joiner, JUDE CNP

## 2022-09-19 ENCOUNTER — HOSPITAL ENCOUNTER (OUTPATIENT)
Dept: BONE DENSITY | Facility: HOSPITAL | Age: 77
Discharge: HOME | End: 2022-09-19
Attending: STUDENT IN AN ORGANIZED HEALTH CARE EDUCATION/TRAINING PROGRAM | Admitting: STUDENT IN AN ORGANIZED HEALTH CARE EDUCATION/TRAINING PROGRAM
Payer: COMMERCIAL

## 2022-09-19 DIAGNOSIS — Z78.0 POSTMENOPAUSAL STATUS: ICD-10-CM

## 2022-09-19 DIAGNOSIS — Z00.00 PREVENTATIVE HEALTH CARE: ICD-10-CM

## 2022-09-19 PROCEDURE — 77080 DXA BONE DENSITY AXIAL: CPT

## 2022-09-20 PROBLEM — M85.80 OSTEOPENIA: Status: ACTIVE | Noted: 2022-09-20

## 2022-09-20 PROBLEM — B96.20 E. COLI UTI: Status: RESOLVED | Noted: 2022-02-10 | Resolved: 2022-09-20

## 2022-09-20 PROBLEM — E87.1 HYPONATREMIA: Status: RESOLVED | Noted: 2022-03-18 | Resolved: 2022-09-20

## 2022-09-20 PROBLEM — J44.1 COPD EXACERBATION (H): Status: RESOLVED | Noted: 2022-02-09 | Resolved: 2022-09-20

## 2022-09-20 PROBLEM — N39.0 E. COLI UTI: Status: RESOLVED | Noted: 2022-02-10 | Resolved: 2022-09-20

## 2022-10-01 ENCOUNTER — HEALTH MAINTENANCE LETTER (OUTPATIENT)
Age: 77
End: 2022-10-01

## 2022-10-10 ENCOUNTER — TELEPHONE (OUTPATIENT)
Dept: FAMILY MEDICINE | Facility: CLINIC | Age: 77
End: 2022-10-10

## 2022-10-10 DIAGNOSIS — L60.8 TOENAIL DEFORMITY: Primary | ICD-10-CM

## 2022-10-10 NOTE — TELEPHONE ENCOUNTER
[facility] phone call message - order or referral request for patient:     Order or referral being requested: Referral for pedicure      Additional Comments: Patient called stating her toe nails are very deep into the skin and would like to know if there's a way pcp can send a referral to a pedicure or specialist that can help clip/ look at her to nails, patient states toenails has been hurting from it. Please call and advise.     OK to leave a message on voice mail? Yes    Primary language: Hmong      needed? Yes    Call taken on October 10, 2022 at 1:30 PM by Lisa Melo

## 2022-10-11 NOTE — PROGRESS NOTES
Patient requesting provider that can regularly help with foot cares, particularly help with thickened and painful toenails.   Will try to get Tong set up with podiatrist that she can regularly follow with.    Manoj Burch MD

## 2022-11-03 DIAGNOSIS — E78.5 HYPERLIPIDEMIA, UNSPECIFIED HYPERLIPIDEMIA TYPE: ICD-10-CM

## 2022-11-03 NOTE — TELEPHONE ENCOUNTER
[facility] phone call message- medication clarification/question:    Full Medication Name:     atorvastatin (LIPITOR          Dose:     Sig - Route: Take 1 tablet (40 mg) by mouth daily - Oral   Sent to pharmacy as: Atorvastatin Calcium 40 MG Oral Tablet (LIPITOR)           Question:     Caller place in medication for patient. Please fill medication. Thank you.     Pharmacy confirmed as      [facility] - [place], [place] - [address]: Yes    OK to leave a message on voice mail? Yes    Primary language: ong      needed? Yes    Call taken on November 3, 2022 at 9:13 AM by Maryjo Umana

## 2022-11-07 RX ORDER — ATORVASTATIN CALCIUM 40 MG/1
40 TABLET, FILM COATED ORAL DAILY
Qty: 90 TABLET | Refills: 3 | Status: SHIPPED | OUTPATIENT
Start: 2022-11-07 | End: 2023-08-01

## 2022-12-02 NOTE — PROGRESS NOTES
Pulmonary Clinic Outpatient Follow-Up  12/20/2022    Assessment and Plan:   #. COPD with moderately severe obstruction, well-controlled on Spiriva Respimat.      All medications renewed for the next year    Action Plan -- prednisone 40 mg daily x 5 days with a Z-pack. May repeat x1, but if fails 2 action plan activations back-to-back, will need to go the the ED.    RTC: 12 months or earlier if needed    Vida Marquez MD  Pulmonary and Critical Care   ______________________________________________________________________________    CC: follow up    HPI:   Rhett Alvarado is a 77 year old non-smoker with history of COPD with moderately severe obstruction, chronic mediastinitis, lymphoma in remission, presenting today for follow up of her COPD.  Last seen 6/22/2022 in setting of acute COPD exacerbation; switched her from Augmentin to Z-Terrell due to palpitations. Pertinent medications are Spiriva Respimat, DuoNeb, & albuterol.     Reports doing well, breathing is good. She is using Spiriva daily.   DuoNeb she uses as needed at most 2x/day & albuterol as needed as well.     She got new tubing for her nebulizer machine today.     REVIEW OF SYSTEMS: 10-point ROS was negative with exceptions as detailed in the HPI.    Physical Exam:  /72 (BP Location: Right arm, Patient Position: Chair, Cuff Size: Adult Large)   Pulse 76   Wt 63.7 kg (140 lb 8 oz)   SpO2 95%   BMI 30.31 kg/m    Gen: shaky, nervous appearing elderly woman, alert, oriented, mild distress  HEENT: masked, PERLL; no stridor but audible lower airway wheeze in the office  CV: RRR, no M/G/R  Resp: equal bilateral air entry, breath sounds clear throughout. Talking in full sentences w/ no respiratory distress; no cough during our visit  Abd: deferred  Skin: no apparent rashes  Ext: no cyanosis, clubbing or edema  Neuro: alert, nonfocal    Labs: personally reviewed & interpreted in EMR.   Imaging & Procedures: personally reviewed & interpreted, including formal  Radiology reports in the EMR.  PFT's (3/16/2022): Moderately severe obstruction without clinically significant bronchodilator response, presence of air trapping without hyperinflation, normal diffusion capacity.    MEDICAL HISTORY: Hypertension, diffuse large cell lymphoma in remission, COPD, tuberculosis, dermatitis,  SURGICAL HISTORY:  has a past surgical history that includes IR Miscellaneous Procedure (2/20/2012) and IR Port Removal (7/20/2012).  SOCIAL HISTORY:  reports that she is a non-smoker but has been exposed to tobacco smoke. She has never used smokeless tobacco. She reports that she does not drink alcohol.  FAMILY HISTORY: family history includes No Known Problems in her daughter, father, maternal aunt, maternal grandfather, maternal grandmother, mother, paternal aunt, paternal grandfather, paternal grandmother, and sister.    MEDICATIONS: personally reviewed, including EMR/Care Everywhere. Pertinent information noted & updated.   ALLERGIES:   Allergies   Allergen Reactions     No Known Allergies

## 2022-12-09 ENCOUNTER — OFFICE VISIT (OUTPATIENT)
Dept: DERMATOLOGY | Facility: CLINIC | Age: 77
End: 2022-12-09
Payer: COMMERCIAL

## 2022-12-09 DIAGNOSIS — D48.9 NEOPLASM OF UNCERTAIN BEHAVIOR: ICD-10-CM

## 2022-12-09 DIAGNOSIS — L91.0 KELOID SCAR: Primary | ICD-10-CM

## 2022-12-09 PROCEDURE — 11900 INJECT SKIN LESIONS </W 7: CPT | Performed by: NURSE PRACTITIONER

## 2022-12-09 RX ORDER — TRIAMCINOLONE ACETONIDE 40 MG/ML
0.4 INJECTION, SUSPENSION INTRA-ARTICULAR; INTRAMUSCULAR ONCE
Status: COMPLETED | OUTPATIENT
Start: 2022-12-09 | End: 2022-12-09

## 2022-12-09 RX ADMIN — TRIAMCINOLONE ACETONIDE 16 MG: 40 INJECTION, SUSPENSION INTRA-ARTICULAR; INTRAMUSCULAR at 16:06

## 2022-12-09 NOTE — PROGRESS NOTES
[facility] Dermatology Note  Encounter Date: Dec 9, 2022  Office Visit     Reviewed patients past medical history and pertinent chart review prior to patients visit today.     Dermatology Problem List:  Keloid scarring, betamethasone augmented formula  0.05% ointment PRN, ILK 5/27/2022 and 7/29/2022 and 08/25/22. One area on the left posterior shoulder injected 12/09/22   NUB right forearm    ____________________________________________    Assessment & Plan:     # Keloid scars significantly improved    -Kenalog intralesional injection procedure note: After verbal consent and discussion of risks including but not limited to atrophy, pain, and bruising, time out was performed, 0.4 total cc of Kenalog 40 mg/cc was injected into left posterior shoulder only  The patient tolerated the procedure well and left the Dermatology clinic in good condition.      # Neoplasm of right forearm. Favor lipoma vs other benign neoplasm. Patient would like this removed and will consult with our dermatology surgeons to see if they would like any imaging prior to proceeding.  Photo taken for chart. Dermatology surgery consult placed.      Follow-up in 1 month for further injections    Arlyn Joiner, JUDE CNP on 7/29/2022 at 4:16 PM   _______________________________________    CC: keloid scars  HPI:  Ms. Rhett Alvarado is a(n) 77 year old female who presents today with in person .  She says that her scars are significantly improved and all of the symptoms have resolved except on the left posterior shoulder she still has some itching and discomfort.  She is very happy with her improvement.    She also has a lump on her right forearm that is started to become more bothersome to her lately cosmetically and that it rubs and bumps on things.  She recently bumped it on the counter and created a sore next to the bump.  She has had this for about 5 years, it started smaller but has been the size for quite some time.   She does not think it is actively growing.    History of keloids: I received outside records from 2016 and 2018 when she has had Kenalog injections to these lesions.  It sounds like she had this injected about 1 month ago as well at an outside clinic but I do not have those records.  Patient says that she is having a lot of pain, discomfort, and itching.  It keeps her up at night sometimes.  She would like to have treatment of these.  She says that they started because of her bra rubbing on her skin so these are under both arms on the flanks where her underwire would lay and on both posterior shoulders where the bra strap lays.        Physical Exam:  Vitals: There were no vitals taken for this visit.  SKIN: Waist-up skin, which includes the head/face, neck, both arms, chest, back, abdomen, digits and/or nails was examined.  -4 very large irregular shaped thick but much softer scars on the bilateral shoulders and bilateral flanks extending under the breasts, these have gotten less thickened and less firm since her last visit.   -2 cm dome shaped soft moveable papule with 1 comedolike opening overlying and some surface but not arborizing vessels.     - No other lesions of concern on areas examined.     Medications:  Current Outpatient Medications   Medication     acetaminophen (TYLENOL) 325 MG tablet     albuterol (PROAIR HFA/PROVENTIL HFA/VENTOLIN HFA) 108 (90 Base) MCG/ACT inhaler     amLODIPine (NORVASC) 5 MG tablet     aspirin 81 MG EC tablet     atorvastatin (LIPITOR) 40 MG tablet     augmented betamethasone dipropionate (DIPROLENE AF) 0.05 % external cream     betamethasone valerate (VALISONE) 0.1 % external cream     carboxymethylcellulose PF (REFRESH LIQUIGEL) 1 % ophthalmic gel     ipratropium - albuterol 0.5 mg/2.5 mg/3 mL (DUONEB) 0.5-2.5 (3) MG/3ML neb solution     polyethylene glycol (MIRALAX) 17 GM/Dose powder     tiotropium (SPIRIVA RESPIMAT) 2.5 MCG/ACT inhaler     Current Facility-Administered  Medications   Medication     triamcinolone (KENALOG-40) injection 16 mg      Past Medical History:   Patient Active Problem List   Diagnosis     Fatigue     Acute Lymphoma     Keloid scar     Benign Essential Hypertension     Chronic Obstructive Pulmonary Disease     Dermatitis     Lung mass     Muscle weakness (generalized)     Prolapse of vaginal wall with midline cystocele     Malignant lymphoma, large cell, diffuse (H)     Prediabetes     Tuberculosis     Cystocele with incomplete uterovaginal prolapse     Osteopenia     Past Medical History:   Diagnosis Date     Constipation     Created by Conversion Replacement Utility updated for latest IMO load      E. coli UTI 2/10/2022     Palpitations     Created by Conversion

## 2022-12-09 NOTE — LETTER
12/9/2022         RE: Rhett Alvarado  [address] [facility] [address]        Dear Colleague,    Thank you for referring your patient, Rhett Alvarado[facility] Please see a copy of my visit note below.    [facility] Dermatology Note  Encounter Date: Dec 9, 2022  Office Visit     Reviewed patients past medical history and pertinent chart review prior to patients visit today.     Dermatology Problem List:  Keloid scarring, betamethasone augmented formula  0.05% ointment PRN, ILK 5/27/2022 and 7/29/2022 and 08/25/22. One area on the left posterior shoulder injected 12/09/22   NUB right forearm    ____________________________________________    Assessment & Plan:     # Keloid scars significantly improved    -Kenalog intralesional injection procedure note: After verbal consent and discussion of risks including but not limited to atrophy, pain, and bruising, time out was performed, 0.4 total cc of Kenalog 40 mg/cc was injected into left posterior shoulder only  The patient tolerated the procedure well and left the Dermatology clinic in good condition.      # Neoplasm of right forearm. Favor lipoma vs other benign neoplasm. Patient would like this removed and will consult with our dermatology surgeons to see if they would like any imaging prior to proceeding.  Photo taken for chart. Dermatology surgery consult placed.      Follow-up in 1 month for further injections    Arlyn Joiner, JUDE CNP on 7/29/2022 at 4:16 PM   _______________________________________    CC: keloid scars  HPI:  Ms. Rhett Alvarado is a(n) 77 year old female who presents today with in person .  She says that her scars are significantly improved and all of the symptoms have resolved except on the left posterior shoulder she still has some itching and discomfort.  She is very happy with her improvement.    She also has a lump on her right forearm that is started to become more bothersome  to her lately cosmetically and that it rubs and bumps on things.  She recently bumped it on the counter and created a sore next to the bump.  She has had this for about 5 years, it started smaller but has been the size for quite some time.  She does not think it is actively growing.    History of keloids: I received outside records from 2016 and 2018 when she has had Kenalog injections to these lesions.  It sounds like she had this injected about 1 month ago as well at an outside clinic but I do not have those records.  Patient says that she is having a lot of pain, discomfort, and itching.  It keeps her up at night sometimes.  She would like to have treatment of these.  She says that they started because of her bra rubbing on her skin so these are under both arms on the flanks where her underwire would lay and on both posterior shoulders where the bra strap lays.        Physical Exam:  Vitals: There were no vitals taken for this visit.  SKIN: Waist-up skin, which includes the head/face, neck, both arms, chest, back, abdomen, digits and/or nails was examined.  -4 very large irregular shaped thick but much softer scars on the bilateral shoulders and bilateral flanks extending under the breasts, these have gotten less thickened and less firm since her last visit.   -2 cm dome shaped soft moveable papule with 1 comedolike opening overlying and some surface but not arborizing vessels.     - No other lesions of concern on areas examined.     Medications:  Current Outpatient Medications   Medication     acetaminophen (TYLENOL) 325 MG tablet     albuterol (PROAIR HFA/PROVENTIL HFA/VENTOLIN HFA) 108 (90 Base) MCG/ACT inhaler     amLODIPine (NORVASC) 5 MG tablet     aspirin 81 MG EC tablet     atorvastatin (LIPITOR) 40 MG tablet     augmented betamethasone dipropionate (DIPROLENE AF) 0.05 % external cream     betamethasone valerate (VALISONE) 0.1 % external cream     carboxymethylcellulose PF (REFRESH LIQUIGEL) 1 %  ophthalmic gel     ipratropium - albuterol 0.5 mg/2.5 mg/3 mL (DUONEB) 0.5-2.5 (3) MG/3ML neb solution     polyethylene glycol (MIRALAX) 17 GM/Dose powder     tiotropium (SPIRIVA RESPIMAT) 2.5 MCG/ACT inhaler     Current Facility-Administered Medications   Medication     triamcinolone (KENALOG-40) injection 16 mg      Past Medical History:   Patient Active Problem List   Diagnosis     Fatigue     Acute Lymphoma     Keloid scar     Benign Essential Hypertension     Chronic Obstructive Pulmonary Disease     Dermatitis     Lung mass     Muscle weakness (generalized)     Prolapse of vaginal wall with midline cystocele     Malignant lymphoma, large cell, diffuse (H)     Prediabetes     Tuberculosis     Cystocele with incomplete uterovaginal prolapse     Osteopenia     Past Medical History:   Diagnosis Date     Constipation     Created by Conversion Replacement Utility updated for latest IMO load      E. coli UTI 2/10/2022     Palpitations     Created by Conversion                    Again, thank you for allowing me to participate in the care of your patient.        Sincerely,        Arlyn Joiner, JUDE CNP

## 2022-12-12 ENCOUNTER — TELEPHONE (OUTPATIENT)
Dept: DERMATOLOGY | Facility: CLINIC | Age: 77
End: 2022-12-12

## 2022-12-12 NOTE — TELEPHONE ENCOUNTER
Called patient to schedule surgery with Dr. Cyr    Date of Surgery: 02/07    Surgery type: mohs    Consult scheduled: Yes    Has patient had mohs with us before? No    Additional comments: saige Shahid on 12/12/2022 at 2:54 PM

## 2022-12-13 NOTE — TELEPHONE ENCOUNTER
FUTURE VISIT INFORMATION      FUTURE VISIT INFORMATION:    Date: 1.24.23    Time: 2:30    Location: [facility]  REFERRAL INFORMATION:    Referring provider:  [facility]    Referring providers clinic:  Derm    Reason for visit/diagnosis  2 cm very soft nodule on right forearm. mohs versus excision.    RECORDS REQUESTED FROM:       Clinic name Comments Records Status Imaging Status   Derm 12.9.22  [facility] Epic Epic

## 2022-12-20 ENCOUNTER — OFFICE VISIT (OUTPATIENT)
Dept: PULMONOLOGY | Facility: CLINIC | Age: 77
End: 2022-12-20
Payer: COMMERCIAL

## 2022-12-20 VITALS
OXYGEN SATURATION: 95 % | DIASTOLIC BLOOD PRESSURE: 72 MMHG | BODY MASS INDEX: 30.31 KG/M2 | HEART RATE: 76 BPM | WEIGHT: 140.5 LBS | SYSTOLIC BLOOD PRESSURE: 126 MMHG

## 2022-12-20 DIAGNOSIS — J44.9 CHRONIC OBSTRUCTIVE PULMONARY DISEASE, UNSPECIFIED COPD TYPE (H): ICD-10-CM

## 2022-12-20 PROCEDURE — 99213 OFFICE O/P EST LOW 20 MIN: CPT | Performed by: INTERNAL MEDICINE

## 2022-12-20 RX ORDER — ALBUTEROL SULFATE 90 UG/1
2 AEROSOL, METERED RESPIRATORY (INHALATION) EVERY 6 HOURS PRN
Qty: 18 G | Refills: 3 | Status: SHIPPED | OUTPATIENT
Start: 2022-12-20 | End: 2023-02-28

## 2022-12-20 RX ORDER — IPRATROPIUM BROMIDE AND ALBUTEROL SULFATE 2.5; .5 MG/3ML; MG/3ML
1 SOLUTION RESPIRATORY (INHALATION) EVERY 6 HOURS PRN
Qty: 360 ML | Refills: 4 | Status: SHIPPED | OUTPATIENT
Start: 2022-12-20 | End: 2023-04-04

## 2022-12-20 NOTE — PATIENT INSTRUCTIONS
Plan:  Continue taking the Spiriva Respimat daily. Use your other medications as needed when you are short of breath or have a bad cough.    You should follow up in 12 months or earlier if you develop breathing problems.     If you have any questions or concerns, please, call our clinic at [phone].

## 2023-01-02 DIAGNOSIS — E04.1 THYROID NODULE: Primary | ICD-10-CM

## 2023-01-02 NOTE — PROGRESS NOTES
Patient unfortunately missed endocrine appt (showed up an hour late). Endocrinology rec'd having radiology perform FNA of thyroid nodule to expedite.    -Placed order for US Biopsy Thyroid Fine Needle Aspiration    Manoj Burch MD

## 2023-01-23 NOTE — PROGRESS NOTES
Mohs Micrographic Surgery Consult Note    Jan 24, 2023    Dermatology Problem List:  1. Keloid scarring  - betamethasone augmented formula  0.05% ointment PRN  - ILK 5/27/2022 and 7/29/2022 and 08/25/22, 12/9/22   2. Neurofibroma, right forearm, pending excision   2. Neurofibroma, right lower abdomen. Patient will decide if she would like it excised.   _________________________________________    Subjective: The patient is a 77 year old woman who presents today for Mohs micrographic surgery consultation for a recent diagnosis of skin cancer.    The patient reports that the lump on her forearm has been present for about ten years. It has slowly grown over time. It has never popped or bled. There is no pain associated with the lesion. She also reports that she has one on her abdomen but she is not interested in treating it.    Skin lesion(s): Neurofibroma   Location(s): right forearm   Associated symptoms: pruritus, tenderness to touch  Onset: within last 1 year    No other associated symptoms, modifying factors, or prior treatments, except when noted above. The patient denies any constitutional symptoms, lymphadenopathy, unintentional weight loss or decreased appetite. No other skin concerns today.    Objective:   Gen: This is a well appearing individual in no acute distress. The patient is alert and oriented x 3.  An exam of the right forearm and abdomen was performed today and visualized the following:  - 2 cm pink soft tumor on the right forearm.  - 2.3 cm pink soft tumor on the right abdomen.    Assessment and Plan:     1. Plan for wide local excision surgery for dermatofibroma of the right forearm. She will decide if she would like the neurofibroma on her abdomen excised as well.   - We discussed the nature of the diagnosis/condition above. We discussed the treatment options, including the risks benefits and expectations of these options. We recommend excision as the most effective and most tissue sparing option  for treatment, and the patient agrees to proceed with this.  The patient is aware of the risks, benefits and expectations of this procedure. The patient will be scheduled for this procedure, if not already done so.  - We anticipate the following closure type: Linear closure, such as intermediate linear closure (CLC) or purse string     The patient was discussed with and evaluated by attending physician, You Cyr DO.    Scribe Disclosure:  I, JOE DUPREE, am serving as a scribe to document services personally performed by You Cyr MD based on data collection and the provider's statements to me.     Provider Disclosure:   The documentation recorded by the scribe accurately reflects the services I personally performed and the decisions made by me.    You Cyr DO    Department of Dermatology  [facility] [place][note 2]: Phone: [phone], Fax:[phone]  [facility]: Phone: [phone], Fax: [phone]

## 2023-01-24 ENCOUNTER — PRE VISIT (OUTPATIENT)
Dept: DERMATOLOGY | Facility: CLINIC | Age: 78
End: 2023-01-24

## 2023-01-24 ENCOUNTER — OFFICE VISIT (OUTPATIENT)
Dept: DERMATOLOGY | Facility: CLINIC | Age: 78
End: 2023-01-24
Payer: COMMERCIAL

## 2023-01-24 DIAGNOSIS — M79.89 NODULE OF SOFT TISSUE: Primary | ICD-10-CM

## 2023-01-24 DIAGNOSIS — D36.10 NEUROFIBROMA: ICD-10-CM

## 2023-01-24 PROCEDURE — 99213 OFFICE O/P EST LOW 20 MIN: CPT | Performed by: DERMATOLOGY

## 2023-01-24 ASSESSMENT — PAIN SCALES - GENERAL: PAINLEVEL: NO PAIN (0)

## 2023-01-24 NOTE — NURSING NOTE
Chief Complaint   Patient presents with     Derm Problem     Patient is here today for a consult regarding right forearm nodule.     Danuta IRIZARRY RN

## 2023-01-24 NOTE — LETTER
1/24/2023       RE: Rhett Alvarado  [address] [facility] [address]     Dear Colleague,    Thank you for referring your patient, Rhett Alvarado, to the [facility] DERMATOLOGIC SURGERY CLINIC [place] [facility]. Please see a copy of my visit note below.    Mohs Micrographic Surgery Consult Note    Jan 24, 2023    Dermatology Problem List:  1. Keloid scarring  - betamethasone augmented formula  0.05% ointment PRN  - ILK 5/27/2022 and 7/29/2022 and 08/25/22, 12/9/22   2. Neurofibroma, right forearm, pending excision   2. Neurofibroma, right lower abdomen. Patient will decide if she would like it excised.   _________________________________________    Subjective: The patient is a 77 year old woman who presents today for Mohs micrographic surgery consultation for a recent diagnosis of skin cancer.    The patient reports that the lump on her forearm has been present for about ten years. It has slowly grown over time. It has never popped or bled. There is no pain associated with the lesion. She also reports that she has one on her abdomen but she is not interested in treating it.    Skin lesion(s): Neurofibroma   Location(s): right forearm   Associated symptoms: pruritus, tenderness to touch  Onset: within last 1 year    No other associated symptoms, modifying factors, or prior treatments, except when noted above. The patient denies any constitutional symptoms, lymphadenopathy, unintentional weight loss or decreased appetite. No other skin concerns today.    Objective:   Gen: This is a well appearing individual in no acute distress. The patient is alert and oriented x 3.  An exam of the right forearm and abdomen was performed today and visualized the following:  - 2 cm pink soft tumor on the right forearm.  - 2.3 cm pink soft tumor on the right abdomen.    Assessment and Plan:     1. Plan for wide local excision surgery for dermatofibroma of the right forearm. She  will decide if she would like the neurofibroma on her abdomen excised as well.   - We discussed the nature of the diagnosis/condition above. We discussed the treatment options, including the risks benefits and expectations of these options. We recommend excision as the most effective and most tissue sparing option for treatment, and the patient agrees to proceed with this.  The patient is aware of the risks, benefits and expectations of this procedure. The patient will be scheduled for this procedure, if not already done so.  - We anticipate the following closure type: Linear closure, such as intermediate linear closure (CLC) or purse string     The patient was discussed with and evaluated by attending physician, You Cyr DO.    Scribe Disclosure:  I, JOE DUPREE, am serving as a scribe to document services personally performed by You Cyr MD based on data collection and the provider's statements to me.     Provider Disclosure:   The documentation recorded by the scribe accurately reflects the services I personally performed and the decisions made by me.    You Cyr DO    Department of Dermatology  [facility]: Phone: [phone], Fax:[phone]  [facility]: Phone: [phone], Fax: [phone]

## 2023-01-25 LAB — RETINOPATHY: NORMAL

## 2023-02-02 ENCOUNTER — OFFICE VISIT (OUTPATIENT)
Dept: DERMATOLOGY | Facility: CLINIC | Age: 78
End: 2023-02-02
Payer: COMMERCIAL

## 2023-02-02 DIAGNOSIS — L91.0 KELOID: Primary | ICD-10-CM

## 2023-02-02 PROCEDURE — 99212 OFFICE O/P EST SF 10 MIN: CPT | Performed by: DERMATOLOGY

## 2023-02-02 NOTE — PROGRESS NOTES
Rhett Alvarado is an extremely pleasant 77 year old year old female patient here today for keloid on flank and back.  Seen by Dr. Villanueva and JOSE R Joiner and injected with steroid. Much improved, no symptoms no issues.  Patient has no other skin complaints today.  Remainder of the HPI, Meds, PMH, Allergies, FH, and SH was reviewed in chart.      Past Medical History:   Diagnosis Date     Constipation     Created by Conversion Replacement Utility updated for latest IMO load      E. coli UTI 2/10/2022     Palpitations     Created by Conversion        Past Surgical History:   Procedure Laterality Date     IR MISCELLANEOUS PROCEDURE  2/20/2012     IR PORT REMOVAL  7/20/2012        Family History   Problem Relation Age of Onset     No Known Problems Mother      No Known Problems Father      No Known Problems Sister      No Known Problems Daughter      No Known Problems Maternal Grandmother      No Known Problems Maternal Grandfather      No Known Problems Paternal Grandmother      No Known Problems Paternal Grandfather      No Known Problems Maternal Aunt      No Known Problems Paternal Aunt      Hereditary Breast and Ovarian Cancer Syndrome No family hx of      Breast Cancer No family hx of      Cancer No family hx of      Colon Cancer No family hx of      Endometrial Cancer No family hx of      Ovarian Cancer No family hx of        Social History     Socioeconomic History     Marital status: Single     Spouse name: Not on file     Number of children: Not on file     Years of education: Not on file     Highest education level: Not on file   Occupational History     Not on file   Tobacco Use     Smoking status: Never     Passive exposure: Yes     Smokeless tobacco: Never   Vaping Use     Vaping Use: Never used   Substance and Sexual Activity     Alcohol use: No     Drug use: Not on file     Sexual activity: Not on file   Other Topics Concern     Not on file   Social History Narrative     Not on file     Social Determinants of  Health     Financial Resource Strain: Not on file   Food Insecurity: Not on file   Transportation Needs: Not on file   Physical Activity: Not on file   Stress: Not on file   Social Connections: Not on file   Intimate Partner Violence: Not on file   Housing Stability: Not on file       Outpatient Encounter Medications as of 2/2/2023   Medication Sig Dispense Refill     acetaminophen (TYLENOL) 325 MG tablet Take 650 mg by mouth every 6 hours as needed       albuterol (PROAIR HFA/PROVENTIL HFA/VENTOLIN HFA) 108 (90 Base) MCG/ACT inhaler Inhale 2 puffs into the lungs every 6 hours as needed for shortness of breath or wheezing 18 g 3     amLODIPine (NORVASC) 5 MG tablet Take 1 tablet (5 mg) by mouth daily 90 tablet 3     aspirin 81 MG EC tablet Take 81 mg by mouth daily       atorvastatin (LIPITOR) 40 MG tablet Take 1 tablet (40 mg) by mouth daily 90 tablet 3     augmented betamethasone dipropionate (DIPROLENE AF) 0.05 % external cream Apply topically 2 times daily To thickened areas of scars 50 g 0     betamethasone valerate (VALISONE) 0.1 % external cream Apply topically 2 times daily 45 g 1     carboxymethylcellulose PF (REFRESH LIQUIGEL) 1 % ophthalmic gel Place 1 drop into both eyes 2 times daily as needed for dry eyes 30 each 1     ipratropium - albuterol 0.5 mg/2.5 mg/3 mL (DUONEB) 0.5-2.5 (3) MG/3ML neb solution Take 1 vial (3 mLs) by nebulization every 6 hours as needed for shortness of breath or wheezing 360 mL 4     polyethylene glycol (MIRALAX) 17 GM/Dose powder Take 17 g by mouth daily 510 g 0     tiotropium (SPIRIVA RESPIMAT) 2.5 MCG/ACT inhaler Inhale 2 puffs into the lungs daily 4 g 11     No facility-administered encounter medications on file as of 2/2/2023.             O:   NAD, WDWN, Alert & Oriented, Mood & Affect wnl, Vitals stable   Here today with nephew    General appearance normal   Vitals stable   Alert, oriented and in no acute distress   Hyperpigmented plaques and patche son right upper back  and r flank with some firm areas on posterior r flank      Eyes: Conjunctivae/lids:Normal     ENT: Lips, buccal mucosa, tongue: normal    MSK:Normal    Cardiovascular: peripheral edema none    Pulm: Breathing Normal    Neuro/Psych: Orientation:Alert and Orientedx3 ; Mood/Affect:normal       A/P:  1. Keloid  Il tac, topicals, radiation, laser discussed with patient   She is asymptomatic now so we will follow clinically  Return to clinic prn   It was a pleasure speaking to Rhett Alvarado today.

## 2023-02-02 NOTE — LETTER
2/2/2023         RE: Rhett Alvarado  [address] [facility] [address]        Dear Colleague,    Thank you for referring your patient, Rhett Alvarado, to the [facility]. Please see a copy of my visit note below.    Rhett Alvarado is an extremely pleasant 77 year old year old female patient here today for keloid on flank and back.  Seen by Dr. Villanueva and JOSE R Joiner and injected with steroid. Much improved, no symptoms no issues.  Patient has no other skin complaints today.  Remainder of the HPI, Meds, PMH, Allergies, FH, and SH was reviewed in chart.      Past Medical History:   Diagnosis Date     Constipation     Created by Conversion Replacement Utility updated for latest IMO load      E. coli UTI 2/10/2022     Palpitations     Created by Conversion        Past Surgical History:   Procedure Laterality Date     IR MISCELLANEOUS PROCEDURE  2/20/2012     IR PORT REMOVAL  7/20/2012        Family History   Problem Relation Age of Onset     No Known Problems Mother      No Known Problems Father      No Known Problems Sister      No Known Problems Daughter      No Known Problems Maternal Grandmother      No Known Problems Maternal Grandfather      No Known Problems Paternal Grandmother      No Known Problems Paternal Grandfather      No Known Problems Maternal Aunt      No Known Problems Paternal Aunt      Hereditary Breast and Ovarian Cancer Syndrome No family hx of      Breast Cancer No family hx of      Cancer No family hx of      Colon Cancer No family hx of      Endometrial Cancer No family hx of      Ovarian Cancer No family hx of        Social History     Socioeconomic History     Marital status: Single     Spouse name: Not on file     Number of children: Not on file     Years of education: Not on file     Highest education level: Not on file   Occupational History     Not on file   Tobacco Use     Smoking status: Never     Passive exposure: Yes     Smokeless tobacco: Never   Vaping Use      Vaping Use: Never used   Substance and Sexual Activity     Alcohol use: No     Drug use: Not on file     Sexual activity: Not on file   Other Topics Concern     Not on file   Social History Narrative     Not on file     Social Determinants of Health     Financial Resource Strain: Not on file   Food Insecurity: Not on file   Transportation Needs: Not on file   Physical Activity: Not on file   Stress: Not on file   Social Connections: Not on file   Intimate Partner Violence: Not on file   Housing Stability: Not on file       Outpatient Encounter Medications as of 2/2/2023   Medication Sig Dispense Refill     acetaminophen (TYLENOL) 325 MG tablet Take 650 mg by mouth every 6 hours as needed       albuterol (PROAIR HFA/PROVENTIL HFA/VENTOLIN HFA) 108 (90 Base) MCG/ACT inhaler Inhale 2 puffs into the lungs every 6 hours as needed for shortness of breath or wheezing 18 g 3     amLODIPine (NORVASC) 5 MG tablet Take 1 tablet (5 mg) by mouth daily 90 tablet 3     aspirin 81 MG EC tablet Take 81 mg by mouth daily       atorvastatin (LIPITOR) 40 MG tablet Take 1 tablet (40 mg) by mouth daily 90 tablet 3     augmented betamethasone dipropionate (DIPROLENE AF) 0.05 % external cream Apply topically 2 times daily To thickened areas of scars 50 g 0     betamethasone valerate (VALISONE) 0.1 % external cream Apply topically 2 times daily 45 g 1     carboxymethylcellulose PF (REFRESH LIQUIGEL) 1 % ophthalmic gel Place 1 drop into both eyes 2 times daily as needed for dry eyes 30 each 1     ipratropium - albuterol 0.5 mg/2.5 mg/3 mL (DUONEB) 0.5-2.5 (3) MG/3ML neb solution Take 1 vial (3 mLs) by nebulization every 6 hours as needed for shortness of breath or wheezing 360 mL 4     polyethylene glycol (MIRALAX) 17 GM/Dose powder Take 17 g by mouth daily 510 g 0     tiotropium (SPIRIVA RESPIMAT) 2.5 MCG/ACT inhaler Inhale 2 puffs into the lungs daily 4 g 11     No facility-administered encounter medications on file as of 2/2/2023.              O:   NAD, WDWN, Alert & Oriented, Mood & Affect wnl, Vitals stable   Here today with nephew    General appearance normal   Vitals stable   Alert, oriented and in no acute distress   Hyperpigmented plaques and patche son right upper back and r flank with some firm areas on posterior r flank      Eyes: Conjunctivae/lids:Normal     ENT: Lips, buccal mucosa, tongue: normal    MSK:Normal    Cardiovascular: peripheral edema none    Pulm: Breathing Normal    Neuro/Psych: Orientation:Alert and Orientedx3 ; Mood/Affect:normal       A/P:  1. Keloid  Il tac, topicals, radiation, laser discussed with patient   She is asymptomatic now so we will follow clinically  Return to clinic prn   It was a pleasure speaking to Rhett Alvarado today.        Again, thank you for allowing me to participate in the care of your patient.        Sincerely,        Servando Dalton MD

## 2023-02-07 ENCOUNTER — OFFICE VISIT (OUTPATIENT)
Dept: DERMATOLOGY | Facility: CLINIC | Age: 78
End: 2023-02-07
Attending: NURSE PRACTITIONER
Payer: COMMERCIAL

## 2023-02-07 VITALS — HEART RATE: 74 BPM | SYSTOLIC BLOOD PRESSURE: 147 MMHG | DIASTOLIC BLOOD PRESSURE: 92 MMHG

## 2023-02-07 DIAGNOSIS — D48.9 NEOPLASM OF UNCERTAIN BEHAVIOR: ICD-10-CM

## 2023-02-07 DIAGNOSIS — D36.15: Primary | ICD-10-CM

## 2023-02-07 PROCEDURE — 88305 TISSUE EXAM BY PATHOLOGIST: CPT | Mod: 26 | Performed by: PATHOLOGY

## 2023-02-07 PROCEDURE — 12035 INTMD RPR S/A/T/EXT 12.6-20: CPT | Mod: GC | Performed by: DERMATOLOGY

## 2023-02-07 PROCEDURE — 11402 EXC TR-EXT B9+MARG 1.1-2 CM: CPT | Mod: GC | Performed by: DERMATOLOGY

## 2023-02-07 PROCEDURE — 88305 TISSUE EXAM BY PATHOLOGIST: CPT | Mod: TC | Performed by: DERMATOLOGY

## 2023-02-07 PROCEDURE — 11403 EXC TR-EXT B9+MARG 2.1-3CM: CPT | Mod: GC | Performed by: DERMATOLOGY

## 2023-02-07 RX ORDER — TRAMADOL HYDROCHLORIDE 50 MG/1
50 TABLET ORAL EVERY 6 HOURS PRN
Qty: 5 TABLET | Refills: 0 | Status: SHIPPED | OUTPATIENT
Start: 2023-02-07 | End: 2023-02-10

## 2023-02-07 ASSESSMENT — PAIN SCALES - GENERAL: PAINLEVEL: NO PAIN (0)

## 2023-02-07 NOTE — NURSING NOTE
Chief Complaint   Patient presents with     Derm Problem     Patient is here today for removal of soft nodule on the right forearm.     Danuta IRIZARRY RN

## 2023-02-07 NOTE — PATIENT INSTRUCTIONS

## 2023-02-07 NOTE — LETTER
2/7/2023       RE: Rhett Alvarado  [address] [facility] [address]     Dear Colleague,    Thank you for referring your patient, Rhett Alvarado, to the [facility] DERMATOLOGIC SURGERY CLINIC [place] [facility]. Please see a copy of my visit note below.    DERMATOLOGY EXCISION PROCEDURE NOTE    Dermatology Problem List:  1. Keloid scarring  - betamethasone augmented formula  0.05% ointment PRN  - ILK 5/27/2022 and 7/29/2022 and 08/25/22, 12/9/22   2. Neurofibroma, R forearm, s/p excision 2/7/23   3. Neurofibroma, R lower abdomen, s/p excision 2/7/23  _________________________________________    Case 1  NAME OF PROCEDURE: Excision intermediate layered linear closure  Staff surgeon: You Cyr DO  Resident: David Brooks MD  Scrub Nurse: Danuta Tompkins RN    PRE-OPERATIVE DIAGNOSIS:  Neoplasm of uncertain behavior of skin  POST-OPERATIVE DIAGNOSIS: same   FINAL EXCISION SIZE(DEFECT SIZE): 3.0x2.0 cm, with 0 mm margin   FINAL REPAIR LENGTH: 8.3 cm     INDICATIONS: This patient presented with a 3.0x2.0cm neoplasm of uncertain behavior of skin of the right forearm. Excision was indicated. We discussed the principles of treatment and most likely complications including scarring, bleeding, infection, incomplete excision, wound dehiscence, pain, nerve damage, and recurrence. Informed consent was obtained and the patient underwent the procedure as follows:    PROCEDURE: The patient was taken to the operative suite. Time-out was performed.  The treatment area was anesthetized with 1% lidocaine and epinephrine (1:100,000). The area was prepped with Chlorhexidine and rinsed with sterile saline and draped with sterile towels. The lesion was delineated and excised down to subcutaneous fat in a fusiform manner. Hemostasis was obtained by electrocoagulation.     REPAIR: An intermediate layered linear closure was selected as the procedure which would maximally preserve  both function and cosmesis.    After the excision of the tumor, the area was carefully undermined. Hemostasis was obtained with electrocoagulation.  Closure was oriented so that the wound was in the patient's natural skin tension lines. The deep subcutaneous and dermal layers were then closed with 4-0 monocryl sutures. The epidermis was then carefully approximated along the length of the wound using 5-0 fast absorbing gut simple running sutures.     The final wound length was 8.3 cm. A total of 20 ml of anesthesia was administered for all surgical sites. Estimated blood loss was less than 10 ml for all surgical sites. A sterile pressure dressing was applied and wound care instructions, with a written handout, were given. The patient was discharged from the [facility] Center alert and ambulatory.    Follow-up as needed for wound evaluation.     Anatomic Pathology Results: pending        Case 2  NAME OF PROCEDURE: Excision intermediate layered linear closure  Staff surgeon: You Cyr DO  Resident: David Brooks MD  Scrub Nurse: Danuta Tompkins RN    PRE-OPERATIVE DIAGNOSIS:  neurofibroma  POST-OPERATIVE DIAGNOSIS: same   FINAL EXCISION SIZE(DEFECT SIZE): 2.0x1.5 cm, with 0 mm margin   FINAL REPAIR LENGTH: 6.5 cm     INDICATIONS: This patient presented with a 2.0x1.5cm neurofibroma of the right abdomen. Excision was indicated. We discussed the principles of treatment and most likely complications including scarring, bleeding, infection, incomplete excision, wound dehiscence, pain, nerve damage, and recurrence. Informed consent was obtained and the patient underwent the procedure as follows:    PROCEDURE: The patient was taken to the operative suite. Time-out was performed.  The treatment area was anesthetized with 1% lidocaine and epinephrine (1:100,000). The area was prepped with Chlorhexidine and rinsed with sterile saline and draped with sterile towels. The lesion was delineated and excised down to  subcutaneous fat in a elliptical manner. Hemostasis was obtained by electrocoagulation.     REPAIR: An intermediate layered linear closure was selected as the procedure which would maximally preserve both function and cosmesis.    After the excision of the tumor, the area was carefully undermined. Hemostasis was obtained with electrocoagulation.  Closure was oriented so that the wound was in the patient's natural skin tension lines. The deep subcutaneous and dermal layers were then closed with 4-0 monocryl sutures. The epidermis was then carefully approximated along the length of the wound using 4-0 monocryl running subcuticular sutures.     The final wound length was 6.5cm. A total of 20 ml of anesthesia was administered for all surgical sites. Estimated blood loss was less than 10 ml for all surgical sites. A sterile pressure dressing was applied and wound care instructions, with a written handout, were given. The patient was discharged from the D[facility]atologic Surgery Center alert and ambulatory.    Follow-up as needed for wound evaluation.     Anatomic Pathology Results: pending    Staff Involved:    Staff Physician Comments:   I saw and evaluated the patient with the resident and I agree with the assessment and plan and description of the procedure. I was present for the key portions of the above major procedure and examination..    You Cyr DO    Department of Dermatology  [facility]: Phone: [phone], Fax:[phone]  [facility]: Phone: [phone], Fax: [phone]

## 2023-02-07 NOTE — PROGRESS NOTES
DERMATOLOGY EXCISION PROCEDURE NOTE    Dermatology Problem List:  1. Keloid scarring  - betamethasone augmented formula  0.05% ointment PRN  - ILK 5/27/2022 and 7/29/2022 and 08/25/22, 12/9/22   2. Neurofibroma, R forearm, s/p excision 2/7/23   3. Neurofibroma, R lower abdomen, s/p excision 2/7/23  _________________________________________    Case 1  NAME OF PROCEDURE: Excision intermediate layered linear closure  Staff surgeon: You Cyr DO  Resident: David Brooks MD  Scrub Nurse: Danuta Tompkins RN    PRE-OPERATIVE DIAGNOSIS:  Neoplasm of uncertain behavior of skin  POST-OPERATIVE DIAGNOSIS: same   FINAL EXCISION SIZE(DEFECT SIZE): 3.0x2.0 cm, with 0 mm margin   FINAL REPAIR LENGTH: 8.3 cm     INDICATIONS: This patient presented with a 3.0x2.0cm neoplasm of uncertain behavior of skin of the right forearm. Excision was indicated. We discussed the principles of treatment and most likely complications including scarring, bleeding, infection, incomplete excision, wound dehiscence, pain, nerve damage, and recurrence. Informed consent was obtained and the patient underwent the procedure as follows:    PROCEDURE: The patient was taken to the operative suite. Time-out was performed.  The treatment area was anesthetized with 1% lidocaine and epinephrine (1:100,000). The area was prepped with Chlorhexidine and rinsed with sterile saline and draped with sterile towels. The lesion was delineated and excised down to subcutaneous fat in a fusiform manner. Hemostasis was obtained by electrocoagulation.     REPAIR: An intermediate layered linear closure was selected as the procedure which would maximally preserve both function and cosmesis.    After the excision of the tumor, the area was carefully undermined. Hemostasis was obtained with electrocoagulation.  Closure was oriented so that the wound was in the patient's natural skin tension lines. The deep subcutaneous and dermal layers were then closed with 4-0 monocryl  sutures. The epidermis was then carefully approximated along the length of the wound using 5-0 fast absorbing gut simple running sutures.     The final wound length was 8.3 cm. A total of 20 ml of anesthesia was administered for all surgical sites. Estimated blood loss was less than 10 ml for all surgical sites. A sterile pressure dressing was applied and wound care instructions, with a written handout, were given. The patient was discharged from the Dermatologic Surgery Center alert and ambulatory.    Follow-up as needed for wound evaluation.     Anatomic Pathology Results: pending        Case 2  NAME OF PROCEDURE: Excision intermediate layered linear closure  Staff surgeon: You Cyr DO  Resident: David Brooks MD  Scrub Nurse: Danuta Tompkins RN    PRE-OPERATIVE DIAGNOSIS:  neurofibroma  POST-OPERATIVE DIAGNOSIS: same   FINAL EXCISION SIZE(DEFECT SIZE): 2.0x1.5 cm, with 0 mm margin   FINAL REPAIR LENGTH: 6.5 cm     INDICATIONS: This patient presented with a 2.0x1.5cm neurofibroma of the right abdomen. Excision was indicated. We discussed the principles of treatment and most likely complications including scarring, bleeding, infection, incomplete excision, wound dehiscence, pain, nerve damage, and recurrence. Informed consent was obtained and the patient underwent the procedure as follows:    PROCEDURE: The patient was taken to the operative suite. Time-out was performed.  The treatment area was anesthetized with 1% lidocaine and epinephrine (1:100,000). The area was prepped with Chlorhexidine and rinsed with sterile saline and draped with sterile towels. The lesion was delineated and excised down to subcutaneous fat in a elliptical manner. Hemostasis was obtained by electrocoagulation.     REPAIR: An intermediate layered linear closure was selected as the procedure which would maximally preserve both function and cosmesis.    After the excision of the tumor, the area was carefully undermined. Hemostasis was  obtained with electrocoagulation.  Closure was oriented so that the wound was in the patient's natural skin tension lines. The deep subcutaneous and dermal layers were then closed with 4-0 monocryl sutures. The epidermis was then carefully approximated along the length of the wound using 4-0 monocryl running subcuticular sutures.     The final wound length was 6.5cm. A total of 20 ml of anesthesia was administered for all surgical sites. Estimated blood loss was less than 10 ml for all surgical sites. A sterile pressure dressing was applied and wound care instructions, with a written handout, were given. The patient was discharged from the Dermatologic Surgery Center alert and ambulatory.    Follow-up as needed for wound evaluation.     Anatomic Pathology Results: pending    Staff Involved:    Staff Physician Comments:   I saw and evaluated the patient with the resident and I agree with the assessment and plan and description of the procedure. I was present for the key portions of the above major procedure and examination..    You Cyr DO    Department of Dermatology  Formerly [facility]: Phone: [phone], Fax:[phone]  [facility]: Phone: [phone], Fax: [phone]

## 2023-02-10 LAB
PATH REPORT.COMMENTS IMP SPEC: NORMAL
PATH REPORT.COMMENTS IMP SPEC: NORMAL
PATH REPORT.FINAL DX SPEC: NORMAL
PATH REPORT.GROSS SPEC: NORMAL
PATH REPORT.MICROSCOPIC SPEC OTHER STN: NORMAL
PATH REPORT.RELEVANT HX SPEC: NORMAL

## 2023-02-21 ENCOUNTER — OFFICE VISIT (OUTPATIENT)
Dept: DERMATOLOGY | Facility: CLINIC | Age: 78
End: 2023-02-21
Payer: COMMERCIAL

## 2023-02-21 DIAGNOSIS — D36.10 NEUROFIBROMA: Primary | ICD-10-CM

## 2023-02-21 DIAGNOSIS — Z48.89 ENCOUNTER FOR POST SURGICAL WOUND CHECK: ICD-10-CM

## 2023-02-21 PROCEDURE — 99024 POSTOP FOLLOW-UP VISIT: CPT | Performed by: DERMATOLOGY

## 2023-02-21 ASSESSMENT — PAIN SCALES - GENERAL: PAINLEVEL: MODERATE PAIN (5)

## 2023-02-21 NOTE — LETTER
2/21/2023       RE: Rhett Alvarado  [address] [facility] [address]     Dear Colleague,    Thank you for referring your patient, Rhett Alvarado, to the [facility] DERMATOLOGIC SURGERY CLINIC [place] [facility]. Please see a copy of my visit note below.    [facility] Dermatology Note  Encounter Date: Feb 21, 2023  Office Visit     Dermatology Problem List:  1. Keloid scarring  - betamethasone augmented formula  0.05% ointment PRN  - ILK 5/27/2022 and 7/29/2022 and 08/25/22, 12/9/22   2. Neurofibroma, R forearm, s/p excision 2/7/23   3. Neurofibroma, R lower abdomen, s/p excision 2/7/23    ____________________________________________    Assessment & Plan:   1. Neurofibroma, R forearm, s/p excision 2/7/23   2. Neurofibroma, R lower abdomen, s/p excision 2/7/23  Surgical sites are healing well at this time. Anticipatory guidance provided on healing process. No motor deficits appreciated.  - Advised to stop neosporin  - Continue daily vaseline and dressing to promote wound healing  - Continue OTC pain regimen as needed    Procedures Performed:   None    Follow-up: prn for new or changing lesions    Staff and Resident:     Manoj Magana MD  PGY-3 Dermatology  Pager: [phone]      Staff Physician Comments:   I saw and evaluated the patient with the resident and I agree with the assessment and plan. I was present for the examination.    You Cyr DO    Department of Dermatology  [facility]: Phone: [phone], Fax:[phone]  [facility]: Phone: [phone], Fax: [phone]    ____________________________________________    CC: Derm Problem (Patient is here today for a two week wound check regarding arm and abdomen.)    HPI:  Ms. Rhett Alvarado is a(n) 77 year old female who presents today as a return patient for post op  follow up.    - Abdomen surgical site is doing well  - Having pain and discomfort at right arm surgical site, notes swelling and some bumps where the sutures are  - Sometimes discomfort keeps her up at night, sometimes pinching sensation  - Takes tylenol when she has discomfort, both are doing better  - Wonders if any nerves were injured on the right forearm with surgery  - Denies drainage from surgical site   - Has been applying neosporin to the sites  - Wonders whether her stitches need to be taken out   - Patient is otherwise feeling well, without additional skin concerns.    Labs Reviewed:  N/A    Physical Exam:  Vitals: There were no vitals taken for this visit.  SKIN: Focused examination of abdomen and right arm was performed.  - Normal strength of bilateral arms/wrists with flexion, extension, finger abduction/adduction   - Well healed and approximated linear surgical sites on right forearm and right abdomen without signs of infection  - No other lesions of concern on areas examined.     Medications:  Current Outpatient Medications   Medication     acetaminophen (TYLENOL) 325 MG tablet     albuterol (PROAIR HFA/PROVENTIL HFA/VENTOLIN HFA) 108 (90 Base) MCG/ACT inhaler     amLODIPine (NORVASC) 5 MG tablet     aspirin 81 MG EC tablet     atorvastatin (LIPITOR) 40 MG tablet     augmented betamethasone dipropionate (DIPROLENE AF) 0.05 % external cream     betamethasone valerate (VALISONE) 0.1 % external cream     carboxymethylcellulose PF (REFRESH LIQUIGEL) 1 % ophthalmic gel     ipratropium - albuterol 0.5 mg/2.5 mg/3 mL (DUONEB) 0.5-2.5 (3) MG/3ML neb solution     polyethylene glycol (MIRALAX) 17 GM/Dose powder     tiotropium (SPIRIVA RESPIMAT) 2.5 MCG/ACT inhaler     No current facility-administered medications for this visit.      Past Medical History:   Patient Active Problem List   Diagnosis     Fatigue     Acute Lymphoma     Keloid scar     Benign Essential Hypertension     Chronic Obstructive Pulmonary  Disease     Dermatitis     Lung mass     Muscle weakness (generalized)     Prolapse of vaginal wall with midline cystocele     Malignant lymphoma, large cell, diffuse (H)     Prediabetes     Tuberculosis     Cystocele with incomplete uterovaginal prolapse     Osteopenia     Past Medical History:   Diagnosis Date     Constipation     Created by Conversion Replacement Utility updated for latest IMO load      E. coli UTI 2/10/2022     Palpitations     Created by Conversion        CC Arlyn Joiner, APRN CNP  [address] [place] [address] on close of this encounter.

## 2023-02-21 NOTE — PROGRESS NOTES
[facility] Dermatology Note  Encounter Date: Feb 21, 2023  Office Visit     Dermatology Problem List:  1. Keloid scarring  - betamethasone augmented formula  0.05% ointment PRN  - ILK 5/27/2022 and 7/29/2022 and 08/25/22, 12/9/22   2. Neurofibroma, R forearm, s/p excision 2/7/23   3. Neurofibroma, R lower abdomen, s/p excision 2/7/23    ____________________________________________    Assessment & Plan:   1. Neurofibroma, R forearm, s/p excision 2/7/23   2. Neurofibroma, R lower abdomen, s/p excision 2/7/23  Surgical sites are healing well at this time. Anticipatory guidance provided on healing process. No motor deficits appreciated.  - Advised to stop neosporin  - Continue daily vaseline and dressing to promote wound healing  - Continue OTC pain regimen as needed    Procedures Performed:   None    Follow-up: prn for new or changing lesions    Staff and Resident:     Manoj Magana MD  PGY-3 Dermatology  Pager: [phone]      Staff Physician Comments:   I saw and evaluated the patient with the resident and I agree with the assessment and plan. I was present for the examination.    You Cyr DO    Department of Dermatology  [facility]: Phone: [phone], Fax:[phone]  [facility]: Phone: [phone], Fax: [phone]    ____________________________________________    CC: Derm Problem (Patient is here today for a two week wound check regarding arm and abdomen.)    HPI:  Ms. Rhett Alvarado is a(n) 77 year old female who presents today as a return patient for post op follow up.    - Abdomen surgical site is doing well  - Having pain and discomfort at right arm surgical site, notes swelling and some bumps where the sutures are  - Sometimes discomfort keeps her up at night, sometimes pinching sensation  - Takes tylenol when she has discomfort, both are doing better  - Wonders if  any nerves were injured on the right forearm with surgery  - Denies drainage from surgical site   - Has been applying neosporin to the sites  - Wonders whether her stitches need to be taken out   - Patient is otherwise feeling well, without additional skin concerns.    Labs Reviewed:  N/A    Physical Exam:  Vitals: There were no vitals taken for this visit.  SKIN: Focused examination of abdomen and right arm was performed.  - Normal strength of bilateral arms/wrists with flexion, extension, finger abduction/adduction   - Well healed and approximated linear surgical sites on right forearm and right abdomen without signs of infection  - No other lesions of concern on areas examined.     Medications:  Current Outpatient Medications   Medication     acetaminophen (TYLENOL) 325 MG tablet     albuterol (PROAIR HFA/PROVENTIL HFA/VENTOLIN HFA) 108 (90 Base) MCG/ACT inhaler     amLODIPine (NORVASC) 5 MG tablet     aspirin 81 MG EC tablet     atorvastatin (LIPITOR) 40 MG tablet     augmented betamethasone dipropionate (DIPROLENE AF) 0.05 % external cream     betamethasone valerate (VALISONE) 0.1 % external cream     carboxymethylcellulose PF (REFRESH LIQUIGEL) 1 % ophthalmic gel     ipratropium - albuterol 0.5 mg/2.5 mg/3 mL (DUONEB) 0.5-2.5 (3) MG/3ML neb solution     polyethylene glycol (MIRALAX) 17 GM/Dose powder     tiotropium (SPIRIVA RESPIMAT) 2.5 MCG/ACT inhaler     No current facility-administered medications for this visit.      Past Medical History:   Patient Active Problem List   Diagnosis     Fatigue     Acute Lymphoma     Keloid scar     Benign Essential Hypertension     Chronic Obstructive Pulmonary Disease     Dermatitis     Lung mass     Muscle weakness (generalized)     Prolapse of vaginal wall with midline cystocele     Malignant lymphoma, large cell, diffuse (H)     Prediabetes     Tuberculosis     Cystocele with incomplete uterovaginal prolapse     Osteopenia     Past Medical History:   Diagnosis Date      Constipation     Created by Conversion Replacement Utility updated for latest IMO load      E. coli UTI 2/10/2022     Palpitations     Created by Conversion        CC Arlyn Joiner, APRN CNP  [address] [place] [address] on close of this encounter.

## 2023-02-21 NOTE — NURSING NOTE
Chief Complaint   Patient presents with     Derm Problem     Patient is here today for a two week wound check regarding arm and abdomen.     Danuta IRIZARRY RN

## 2023-02-28 ENCOUNTER — OFFICE VISIT (OUTPATIENT)
Dept: FAMILY MEDICINE | Facility: CLINIC | Age: 78
End: 2023-02-28
Payer: COMMERCIAL

## 2023-02-28 VITALS
BODY MASS INDEX: 30.42 KG/M2 | HEIGHT: 57 IN | HEART RATE: 79 BPM | RESPIRATION RATE: 24 BRPM | TEMPERATURE: 98 F | WEIGHT: 141 LBS | OXYGEN SATURATION: 94 % | SYSTOLIC BLOOD PRESSURE: 145 MMHG | DIASTOLIC BLOOD PRESSURE: 85 MMHG

## 2023-02-28 DIAGNOSIS — J44.9 CHRONIC OBSTRUCTIVE PULMONARY DISEASE, UNSPECIFIED COPD TYPE (H): ICD-10-CM

## 2023-02-28 DIAGNOSIS — E04.1 THYROID NODULE: Primary | ICD-10-CM

## 2023-02-28 DIAGNOSIS — I10 ESSENTIAL HYPERTENSION, BENIGN: ICD-10-CM

## 2023-02-28 PROCEDURE — 99214 OFFICE O/P EST MOD 30 MIN: CPT | Mod: GC | Performed by: STUDENT IN AN ORGANIZED HEALTH CARE EDUCATION/TRAINING PROGRAM

## 2023-02-28 RX ORDER — ALBUTEROL SULFATE 90 UG/1
2 AEROSOL, METERED RESPIRATORY (INHALATION) EVERY 6 HOURS PRN
Qty: 18 G | Refills: 3 | Status: SHIPPED | OUTPATIENT
Start: 2023-02-28 | End: 2023-04-04

## 2023-02-28 NOTE — PROGRESS NOTES
Assessment & Plan     Thyroid nodule  Diagnosed approximately 1 year ago, patient missed appointment for endocrinology last year  is requesting new referral.  At this time only needs FMLA, so we will instead place order for biopsy and follow-up.  TSH normal approximately 1 year ago.  Unfortunately did not check labs today, could recheck at future visit.  - US Biopsy Thyroid Fine Needle Aspiration; Future    Essential hypertension, benign  Blood pressure slightly elevated today at 145/85.  Patient notes that pressures have been okay at home.  Requested patient checks blood pressure at home every few days and record numbers, follow-up in 1 to 2 months for blood pressure visit.  If still elevated, would increase amlodipine to 10 mg.  Symptoms stable.     Chronic obstructive pulmonary disease, unspecified COPD type (H)   Patient follows with pulmonology, last visit was approximately 12/2022.  Symptoms stable today.  Requesting refills of inhalers.  - albuterol (PROAIR HFA/PROVENTIL HFA/VENTOLIN HFA) 108 (90 Base) MCG/ACT inhaler; Inhale 2 puffs into the lungs every 6 hours as needed for shortness of breath or wheezing  - tiotropium (SPIRIVA RESPIMAT) 2.5 MCG/ACT inhaler; Inhale 2 puffs into the lungs daily          FUTURE APPOINTMENTS:       - Follow-up visit in 1-2 months for BP visit    No follow-ups on file.    Pierre Camp DO [facility]    Precepted with Dr. Catalina Rivera MD    Sotero Delaney is a 77 year old female with COPD, thyroid nodule, lymphoma, prediabetes, HTN, osteopenia,   accompanied by her daughter, presenting for the following health issues:  Referral (Missed appt last week to see a thyroid dr- needs a new referral ) and Forms (Updated handicap parking form, requesting honoring choices/)      HPI     Pt requesting forms for disability parking certificate. Pt has COPD and occasionally uses walker, or cane, and daughter will assist with walking.     As far as COPD  "goes, symptoms stable as long as she takes things slow. Would like refill of inhalers. Follows with Dr. Marquez, last visit 12/22, plan to see again in 1 year.     Had thyroid nodule x3 seen on CT scan 2/9/22, confirmed with ultrasound 6/2/2022. One nodule meeting criteria for FNA. TSH normal last year at time of diagnosis.  Missed endocrine appointment last year, requesting new referral. Denies any symptoms today.     Review of Systems       Objective    BP (!) 145/85   Pulse 79   Temp 98  F (36.7  C)   Resp 24   Ht 1.448 m (4' 9\")   Wt 64 kg (141 lb)   SpO2 94%   BMI 30.51 kg/m    Body mass index is 30.51 kg/m .  Physical Exam   GENERAL: healthy, alert and no distress  EYES: Eyes grossly normal to inspection, conjunctivae and sclerae normal  RESP: Mildly increased work of breathing, CTAB, diminished bilat.   CV: No cyanosis, RRR, no murmur  MS: no gross musculoskeletal defects noted, no edema  SKIN: no suspicious lesions or rashes  PSYCH: mentation appears normal, affect normal/bright                "

## 2023-02-28 NOTE — PATIENT INSTRUCTIONS
Thank you for coming in today!     Here is what we discussed in our visit:  -Thyroid biopsy ordered. We will call with results.   -Return in 1-2 months for BP check  -Inhalers refilled      We will call you with results of any abnormal labs, otherwise they will be available via [place], and/or the clinic will notify you via letter.     Please call the clinic if you have any questions.     Pierre Camp,   [facility] Resident PGY-3

## 2023-02-28 NOTE — PROGRESS NOTES
Preceptor Attestation:   Patient seen, evaluated and discussed with the resident. I have verified the content of the note, which accurately reflects my assessment of the patient and the plan of care.  Supervising Physician:Catalina Rivera MD  [facility]

## 2023-03-10 ENCOUNTER — TELEPHONE (OUTPATIENT)
Dept: FAMILY MEDICINE | Facility: CLINIC | Age: 78
End: 2023-03-10
Payer: COMMERCIAL

## 2023-03-10 DIAGNOSIS — Z12.31 ENCOUNTER FOR SCREENING MAMMOGRAM FOR BREAST CANCER: Primary | ICD-10-CM

## 2023-03-10 NOTE — TELEPHONE ENCOUNTER
[facility] phone call message - order or referral request for patient:     Order or referral being requested: Mammogram      Additional Comments: Patient states she is due for a mammo and forgot to mention it at visit. Please place an order for her. Thank you!    OK to leave a message on voice mail? Yes    Primary language: Hmong      needed? Yes    Call taken on March 10, 2023 at 10:54 AM by Megan Perez

## 2023-03-28 ENCOUNTER — HOSPITAL ENCOUNTER (OUTPATIENT)
Dept: ULTRASOUND IMAGING | Facility: HOSPITAL | Age: 78
Discharge: HOME | End: 2023-03-28
Attending: FAMILY MEDICINE
Payer: COMMERCIAL

## 2023-03-28 ENCOUNTER — ANCILLARY PROCEDURE (OUTPATIENT)
Dept: MAMMOGRAPHY | Facility: HOSPITAL | Age: 78
End: 2023-03-28
Attending: STUDENT IN AN ORGANIZED HEALTH CARE EDUCATION/TRAINING PROGRAM
Payer: COMMERCIAL

## 2023-03-28 DIAGNOSIS — Z12.31 ENCOUNTER FOR SCREENING MAMMOGRAM FOR BREAST CANCER: ICD-10-CM

## 2023-03-28 DIAGNOSIS — E04.1 THYROID NODULE: ICD-10-CM

## 2023-03-28 PROCEDURE — 88173 CYTOPATH EVAL FNA REPORT: CPT | Mod: TC | Performed by: FAMILY MEDICINE

## 2023-03-28 PROCEDURE — 272N000710 US BIOPSY THYROID FINE NEEDLE ASPIRATION

## 2023-03-28 PROCEDURE — 77067 SCR MAMMO BI INCL CAD: CPT

## 2023-03-30 PROCEDURE — 88172 CYTP DX EVAL FNA 1ST EA SITE: CPT | Mod: 26 | Performed by: PATHOLOGY

## 2023-03-30 PROCEDURE — 88173 CYTOPATH EVAL FNA REPORT: CPT | Mod: 26 | Performed by: PATHOLOGY

## 2023-03-31 NOTE — RESULT ENCOUNTER NOTE
Called patient and left VM using  with result: Your thyroid biopsy returned as benign thyroid nodule, which is good news! It is not cancerous. We can discuss this further next time you are in clinic if you have any questions.     Team, please send letter with result and the above message. Thanks!

## 2023-04-04 ENCOUNTER — OFFICE VISIT (OUTPATIENT)
Dept: FAMILY MEDICINE | Facility: CLINIC | Age: 78
End: 2023-04-04
Payer: COMMERCIAL

## 2023-04-04 VITALS
DIASTOLIC BLOOD PRESSURE: 81 MMHG | SYSTOLIC BLOOD PRESSURE: 136 MMHG | BODY MASS INDEX: 30.08 KG/M2 | HEART RATE: 65 BPM | WEIGHT: 139 LBS | OXYGEN SATURATION: 95 % | RESPIRATION RATE: 18 BRPM

## 2023-04-04 DIAGNOSIS — H40.003 GLAUCOMA SUSPECT, BILATERAL: ICD-10-CM

## 2023-04-04 DIAGNOSIS — I10 ESSENTIAL HYPERTENSION, BENIGN: Primary | ICD-10-CM

## 2023-04-04 DIAGNOSIS — H04.123 DRY EYES: ICD-10-CM

## 2023-04-04 DIAGNOSIS — J44.9 CHRONIC OBSTRUCTIVE PULMONARY DISEASE, UNSPECIFIED COPD TYPE (H): ICD-10-CM

## 2023-04-04 DIAGNOSIS — N18.2 CKD (CHRONIC KIDNEY DISEASE) STAGE 2, GFR 60-89 ML/MIN: ICD-10-CM

## 2023-04-04 PROBLEM — C83.30 MALIGNANT LYMPHOMA, LARGE CELL, DIFFUSE (H): Status: RESOLVED | Noted: 2022-02-25 | Resolved: 2023-04-04

## 2023-04-04 PROCEDURE — 99214 OFFICE O/P EST MOD 30 MIN: CPT | Mod: GC | Performed by: STUDENT IN AN ORGANIZED HEALTH CARE EDUCATION/TRAINING PROGRAM

## 2023-04-04 RX ORDER — IPRATROPIUM BROMIDE AND ALBUTEROL SULFATE 2.5; .5 MG/3ML; MG/3ML
1 SOLUTION RESPIRATORY (INHALATION) EVERY 6 HOURS PRN
Qty: 360 ML | Refills: 4 | Status: SHIPPED | OUTPATIENT
Start: 2023-04-04 | End: 2023-06-29

## 2023-04-04 RX ORDER — ALBUTEROL SULFATE 90 UG/1
2 AEROSOL, METERED RESPIRATORY (INHALATION) EVERY 6 HOURS PRN
Qty: 18 G | Refills: 3 | Status: SHIPPED | OUTPATIENT
Start: 2023-04-04 | End: 2023-09-14

## 2023-04-04 RX ORDER — CARBOXYMETHYLCELLULOSE SODIUM 10 MG/ML
1 GEL OPHTHALMIC 2 TIMES DAILY PRN
Qty: 30 EACH | Refills: 3 | Status: SHIPPED | OUTPATIENT
Start: 2023-04-04

## 2023-04-04 RX ORDER — LATANOPROST 50 UG/ML
1 SOLUTION/ DROPS OPHTHALMIC DAILY
Qty: 7.5 ML | Refills: 3 | Status: SHIPPED | OUTPATIENT
Start: 2023-04-04 | End: 2023-08-01

## 2023-04-04 NOTE — PROGRESS NOTES
Assessment and Plan     (I10) Benign Essential Hypertension  (primary encounter diagnosis)  Comment: BP here 136/81, home pressures 110-130s systolic. Taking amlodipine 5mg daily without issue.  Plan:   -Continue amlodipine 5mg daily  -Counseled to continue home Bps and keep log    (H04.123) Dry eyes  Comment: Primarily in L eye. Uses refresh eye drops which helps. Sees her eye doctor next month.  Plan:   -carboxymethylcellulose PF (REFRESH LIQUIGEL) 1 % ophthalmic gel    (H40.003) Glaucoma suspect, bilateral  Comment: Prior injections. Clinically blind in R eye but L has some reasonable vision still. Follows with eye doctor and has appt coming up. Been told that it's glaucoma.   Plan:  -Latanoprost (XALATAN) 0.005 % ophthalmic solution  -Continue regular eye doctor f/u    (N18.2) CKD (chronic kidney disease) stage 2, GFR 60-89 ml/min  Comment: GFR 70s on last check ~1 year ago.   Plan:   -Counseled on risk modification including BP control    (J44.9) Chronic obstructive pulmonary disease, unspecified COPD type (H)  Comment: Breathing at baseline lately. Taking all meds including spiriva 2 puffs daily.   Plan:   -albuterol (PROAIR HFA/PROVENTIL HFA/VENTOLIN HFA) 108 (90 Base) MCG/ACT inhaler  -ipratropium-albuterol 0.5 mg/2.5 mg/3 mL (DUONEB) 0.5-2.5 (3) MG/3ML neb solution  -Continue spirivia respimat 2 puffs daily  -Counseled to bring inhalers for us to go through together at next visit    Will schedule medicare wellness visit      Options for treatment and follow-up care were reviewed with the patient and/or guardian. Rhett DOLAN Lo and/or guardian engaged in the decision making process and verbalized understanding of the options discussed and agreed with the final plan.    Manoj Burch MD      Precepted today with: Dr Rosenstein           HPI:       Rhett is a 77 year old female with COPD, thyroid nodule, lymphoma, prediabetes, HTN, osteopenia,   accompanied by her daughter, presenting for BP f/u:    BP at home  110-130s systolic.  Taking amodipine as prescribed daily.  No issue with lows.  Otherwise has been doing well; taking her inhalers as prescribed and no breathing issues beyond her baseline.    A [employer]  was used for this visit         PMHX:     Patient Active Problem List   Diagnosis     Fatigue     Acute Lymphoma     Keloid scar     Benign Essential Hypertension     Chronic Obstructive Pulmonary Disease     Dermatitis     Lung mass     Muscle weakness (generalized)     Prolapse of vaginal wall with midline cystocele     Malignant lymphoma, large cell, diffuse (H)     Prediabetes     Tuberculosis     Cystocele with incomplete uterovaginal prolapse     Osteopenia       Current Outpatient Medications   Medication Sig Dispense Refill     albuterol (PROAIR HFA/PROVENTIL HFA/VENTOLIN HFA) 108 (90 Base) MCG/ACT inhaler Inhale 2 puffs into the lungs every 6 hours as needed for shortness of breath or wheezing 18 g 3     carboxymethylcellulose PF (REFRESH LIQUIGEL) 1 % ophthalmic gel Place 1 drop into both eyes 2 times daily as needed for dry eyes 30 each 3     ipratropium - albuterol 0.5 mg/2.5 mg/3 mL (DUONEB) 0.5-2.5 (3) MG/3ML neb solution Take 1 vial (3 mLs) by nebulization every 6 hours as needed for shortness of breath or wheezing 360 mL 4     latanoprost (XALATAN) 0.005 % ophthalmic solution Place 1 drop into both eyes daily 7.5 mL 3     acetaminophen (TYLENOL) 325 MG tablet Take 650 mg by mouth every 6 hours as needed       amLODIPine (NORVASC) 5 MG tablet Take 1 tablet (5 mg) by mouth daily 90 tablet 3     aspirin 81 MG EC tablet Take 81 mg by mouth daily       atorvastatin (LIPITOR) 40 MG tablet Take 1 tablet (40 mg) by mouth daily 90 tablet 3     augmented betamethasone dipropionate (DIPROLENE AF) 0.05 % external cream Apply topically 2 times daily To thickened areas of scars 50 g 0     betamethasone valerate (VALISONE) 0.1 % external cream Apply topically 2 times daily 45 g 1     polyethylene glycol  (MIRALAX) 17 GM/Dose powder Take 17 g by mouth daily 510 g 0     tiotropium (SPIRIVA RESPIMAT) 2.5 MCG/ACT inhaler Inhale 2 puffs into the lungs daily 4 g 11       Social History     Tobacco Use     Smoking status: Never     Passive exposure: Yes     Smokeless tobacco: Never   Vaping Use     Vaping status: Never Used   Substance Use Topics     Alcohol use: No          Allergies   Allergen Reactions     No Known Allergies        No results found for this or any previous visit (from the past 24 hour(s)).         Review of Systems:     10 point ROS negative except for what is noted in HPI          Physical Exam:     Vitals:    04/04/23 1021   BP: 136/81   Pulse: 65   Resp: 18   SpO2: 95%   Weight: 63 kg (139 lb)     Body mass index is 30.08 kg/m .    General: Sitting comfortably. No acute distress. Pleasantly interactive.  HEENT: Conjunctivae are nonicteric. EOMI. Normal eyelids.  Respiratory: No respiratory distress. Diminished lung sounds throughout but clear without wheezes.   Cardiac: RRR. Brisk cap refill.  Abdominal: Abdomen is overweight, soft and non-tender without distention.  Extremities: Upper and lower extremities grossly normal.  Skin: Exposed skin in warm without rashes.  Neurological: Motor function is grossly normal. Blind in R eye.  Psychiatric: Good insight. Normal affect. Well kempt appearance.

## 2023-04-04 NOTE — PROGRESS NOTES
Preceptor Attestation:   Patient seen, evaluated and discussed with the resident Dr. Burch. I have verified the content of the note, which accurately reflects my assessment of the patient and the plan of care.  Supervising Physician:Benjamin Rosenstein, MD, MA  [facility]

## 2023-04-20 ENCOUNTER — MEDICAL CORRESPONDENCE (OUTPATIENT)
Dept: HEALTH INFORMATION MANAGEMENT | Facility: CLINIC | Age: 78
End: 2023-04-20
Payer: COMMERCIAL

## 2023-05-09 ENCOUNTER — OFFICE VISIT (OUTPATIENT)
Dept: DERMATOLOGY | Facility: CLINIC | Age: 78
End: 2023-05-09
Payer: COMMERCIAL

## 2023-05-09 DIAGNOSIS — L91.0 HYPERTROPHIC SCAR: ICD-10-CM

## 2023-05-09 DIAGNOSIS — Z48.89 POSTOPERATIVE VISIT: Primary | ICD-10-CM

## 2023-05-09 PROCEDURE — 99207 PR NO CHARGE LOS: CPT | Mod: GC | Performed by: DERMATOLOGY

## 2023-05-09 RX ORDER — TRIAMCINOLONE ACETONIDE 40 MG/ML
38 INJECTION, SUSPENSION INTRA-ARTICULAR; INTRAMUSCULAR ONCE
Status: COMPLETED | OUTPATIENT
Start: 2023-05-09 | End: 2023-05-09

## 2023-05-09 RX ADMIN — TRIAMCINOLONE ACETONIDE 38 MG: 40 INJECTION, SUSPENSION INTRA-ARTICULAR; INTRAMUSCULAR at 14:36

## 2023-05-09 ASSESSMENT — PAIN SCALES - GENERAL: PAINLEVEL: MILD PAIN (3)

## 2023-05-09 NOTE — LETTER
5/9/2023       RE: Rhett Alvarado  [address] [facility] [address]     Dear Colleague,    Thank you for referring your patient, Rhett Alvarado, to the [facility] [place] at [facility]. Please see a copy of my visit note below.    Dermatologic Surgery Clinic Note    May 9, 2023    Dermatology Problem List:  1. Keloid scarring  - betamethasone augmented formula  0.05% ointment PRN  - ILK 5/27/2022 and 7/29/2022 and 08/25/22, 12/9/22   2. Neurofibroma, R forearm, s/p excision 2/7/23   - c/b hypertrophic scar s/p ILK 5/9/23  3. Neurofibroma, R lower abdomen, s/p excision 2/7/23  - c/b hypertrophic scar s/p ILK 5/9/23    Subjective: The patient is a 78 year old woman who presents today for a wound check after recent dermatologic surgery.  - on the arm excision site, has some pain along the scar. Feels like the pain is all the way across the wound.   - on the abdomen excision site, has some pain with rubbing on the abdomen from the pants but otherwise doing fine     Hmong in-person interpretor services were used during this encounter.      Objective:   Gen: This is a well appearing individual in no acute distress. The patient is alert and oriented x 3.  An exam of the right forearm and right lower abdomen was performed today and visualized the following:  - healed linear scar on right forearm with firm and moderately hypertrophic scar on the ulnar aspect  - pink hypertrophic scar on the R lower abdomen   - No clinical evidence of infection noted today.    Assessment and Plan:     # Neurofibroma, R forearm, s/p excision 2/7/23   # Neurofibroma, R lower abdomen, s/p excision 2/7/23  # Hypertrophic scars   Surgery sites with hypertrophic scars. Discussed options including ILK vs monitor vs scar massage. Patient opted to try ILK today.   - ILK injection today. After discussing with patient the benefits and risks including, but not limited to,  pain, temporary swelling, skin atrophy, skin discoloration, which may be permanent, verbal consent was obtained. 1.9 of kenalog 20 mg/mL was used to inject into 2 sites in the right forearm and right lower abdomen. Patient tolerated procedure well.       RTC in 4-6 weeks for possible repeat injections.     The patient was discussed with and evaluated by attending physician, You Cyr DO.    Scribe Disclosure:  I, Fermín Kumar, am serving as a scribe to document services personally performed by You Cyr DO based on data collection and the provider's statements to me.     Xuan Walls MD  Dermatology Resident, PGY4    Staff Physician Comments:   I saw and evaluated the patient with the resident and I agree with the assessment and plan. I was present for the examination, I personally performed the procedure.     No charge for injection as it was performed to revise a scar created here.     You Cyr DO    Department of Dermatology  [facility]: Phone: [phone], Fax:[phone]  [facility]: Phone: [phone], Fax: [phone]

## 2023-05-09 NOTE — NURSING NOTE
Chief Complaint   Patient presents with     Scar Management     Patient is here today for scar eval on right abdomen and right forearm.      Manasa DOLAN CMA

## 2023-05-09 NOTE — PROGRESS NOTES
Dermatologic Surgery Clinic Note    May 9, 2023    Dermatology Problem List:  1. Keloid scarring  - betamethasone augmented formula  0.05% ointment PRN  - ILK 5/27/2022 and 7/29/2022 and 08/25/22, 12/9/22   2. Neurofibroma, R forearm, s/p excision 2/7/23   - c/b hypertrophic scar s/p ILK 5/9/23  3. Neurofibroma, R lower abdomen, s/p excision 2/7/23  - c/b hypertrophic scar s/p ILK 5/9/23    Subjective: The patient is a 78 year old woman who presents today for a wound check after recent dermatologic surgery.  - on the arm excision site, has some pain along the scar. Feels like the pain is all the way across the wound.   - on the abdomen excision site, has some pain with rubbing on the abdomen from the pants but otherwise doing fine     Hmong in-person interpretor services were used during this encounter.      Objective:   Gen: This is a well appearing individual in no acute distress. The patient is alert and oriented x 3.  An exam of the right forearm and right lower abdomen was performed today and visualized the following:  - healed linear scar on right forearm with firm and moderately hypertrophic scar on the ulnar aspect  - pink hypertrophic scar on the R lower abdomen   - No clinical evidence of infection noted today.    Assessment and Plan:     # Neurofibroma, R forearm, s/p excision 2/7/23   # Neurofibroma, R lower abdomen, s/p excision 2/7/23  # Hypertrophic scars   Surgery sites with hypertrophic scars. Discussed options including ILK vs monitor vs scar massage. Patient opted to try ILK today.   - ILK injection today. After discussing with patient the benefits and risks including, but not limited to, pain, temporary swelling, skin atrophy, skin discoloration, which may be permanent, verbal consent was obtained. 1.9 of kenalog 20 mg/mL was used to inject into 2 sites in the right forearm and right lower abdomen. Patient tolerated procedure well.       RTC in 4-6 weeks for possible repeat injections.     The  patient was discussed with and evaluated by attending physician, You Cyr DO.    Scribe Disclosure:  I, Fermín Kumar, am serving as a scribe to document services personally performed by You Cyr DO based on data collection and the provider's statements to me.     Xuan Walls MD  Dermatology Resident, PGY4    Staff Physician Comments:   I saw and evaluated the patient with the resident and I agree with the assessment and plan. I was present for the examination, I personally performed the procedure.     No charge for injection as it was performed to revise a scar created here.     You Cyr DO    Department of Dermatology  [facility]: Phone: [phone], Fax:[phone]  [facility]: Phone: [phone], Fax: [phone]

## 2023-06-29 ENCOUNTER — OFFICE VISIT (OUTPATIENT)
Dept: FAMILY MEDICINE | Facility: CLINIC | Age: 78
End: 2023-06-29
Payer: COMMERCIAL

## 2023-06-29 VITALS
DIASTOLIC BLOOD PRESSURE: 77 MMHG | SYSTOLIC BLOOD PRESSURE: 127 MMHG | TEMPERATURE: 98.1 F | OXYGEN SATURATION: 93 % | HEART RATE: 122 BPM

## 2023-06-29 DIAGNOSIS — J44.1 COPD EXACERBATION (H): Primary | ICD-10-CM

## 2023-06-29 DIAGNOSIS — J44.9 CHRONIC OBSTRUCTIVE PULMONARY DISEASE, UNSPECIFIED COPD TYPE (H): ICD-10-CM

## 2023-06-29 PROCEDURE — 99214 OFFICE O/P EST MOD 30 MIN: CPT | Mod: GC

## 2023-06-29 RX ORDER — AZITHROMYCIN 250 MG/1
TABLET, FILM COATED ORAL
Qty: 6 TABLET | Refills: 0 | Status: SHIPPED | OUTPATIENT
Start: 2023-06-29 | End: 2023-07-04

## 2023-06-29 RX ORDER — IPRATROPIUM BROMIDE AND ALBUTEROL SULFATE 2.5; .5 MG/3ML; MG/3ML
1 SOLUTION RESPIRATORY (INHALATION) EVERY 6 HOURS PRN
Qty: 360 ML | Refills: 4 | Status: SHIPPED | OUTPATIENT
Start: 2023-06-29 | End: 2023-07-19

## 2023-06-29 RX ORDER — PREDNISONE 20 MG/1
40 TABLET ORAL DAILY
Qty: 10 TABLET | Refills: 0 | Status: SHIPPED | OUTPATIENT
Start: 2023-06-29 | End: 2023-07-04

## 2023-06-29 NOTE — PROGRESS NOTES
"  Assessment & Plan     COPD exacerbation (H)  Patient presents with couple day history of dyspnea and wheezing. Affirms productive cough and being exposed to sick contact at home (COVID negative). Satting 93% on RA and having tachycardia but otherwise afebrile. Physical exam notable for mild increased work of breathing and coarse lung sounds with expiratory wheezing. Consistent with COPD exacerbation. Reasonable to treat with outpatient action plan (as outlined by her pulmonologist in 12/20/2022 visit note). Will have patient follow up thereafter for reassessment. If still symptomatic at that point, can repeat action plan once more but will have to go to ED thereafter if sxs still persistent. Counseled patient and family about red-flag sxs to watch for that would warrant ED visit sooner.   - azithromycin (ZITHROMAX) 250 MG tablet; Take 2 tablets (500 mg) by mouth daily for 1 day, THEN 1 tablet (250 mg) daily for 4 days.  - predniSONE (DELTASONE) 20 MG tablet; Take 2 tablets (40 mg) by mouth daily for 5 days  - ipratropium - albuterol 0.5 mg/2.5 mg/3 mL (DUONEB) 0.5-2.5 (3) MG/3ML neb solution; Take 1 vial (3 mLs) by nebulization every 6 hours as needed for shortness of breath or wheezing      Return in about 5 days (around 7/4/2023) for Follow up.    Chaitanya Brewer MD  [facility]    Sotero Delaney is a 78 year old, presenting for the following health issues:  Asthma (w/sob) and needs colonoscopy neverwas called for an appt    HPI   CC: has had wheezing and dyspnea x2 days.   Has had these sxs before with COPD exacerbation. No confirmed fevers, but affirms chills and diaphoresis if she takes tylenol.   Has been \"a long time\" since she has seen pulm.   Coughing a lot with white mucus.  has been sick too - he reportedly came in a few days ago and was COVID negative.   No congestion, CP, N/V/D. Has had reduced PO intake. Denies increased weakness or falls.     Review of Systems "   Constitutional, HEENT, cardiovascular, pulmonary, gi and gu systems are negative, except as otherwise noted.      Objective    /77   Pulse (!) 122   Temp 98.1  F (36.7  C) (Tympanic)   SpO2 93%   There is no height or weight on file to calculate BMI.  Physical Exam   GENERAL: fatigued but non-toxic, alert and no distress.   EYES: Eyes grossly normal to inspection, PERRL and conjunctivae and sclerae normal.  RESP: mild increased work of breathing on RA, bilateral coarse lung sounds with expiratory wheezing.   CV: tachycardia, regular rhythm, normal S1 S2, no S3 or S4, no murmur, click or rub, no peripheral edema and peripheral pulses strong.  ABDOMEN: soft, nontender, no hepatosplenomegaly, no masses and bowel sounds normal.  MS: no gross musculoskeletal defects noted, no edema.  SKIN: no suspicious lesions or rashes.  NEURO: Normal strength and tone, mentation intact and speech normal.  PSYCH: mentation appears normal, affect normal/bright.    ----- Service Performed and Documented by Resident or Fellow ------

## 2023-06-29 NOTE — PATIENT INSTRUCTIONS
Nice meeting you today!    1)  prednisone and azithromycin at pharmacy. Refilled your duonebs as well.     2) Follow up in 5 days.

## 2023-06-29 NOTE — PROGRESS NOTES
Faculty Supervision of Residents   I have examined this patient and the medical care has been evaluated and discussed with the resident. See resident note outlining our discussion.    COPD exacerbation after viral illness.  with the same symptoms and negative for COVID. Will teat with action plan from pulmonology with close follow up.     Radha Diana MD

## 2023-07-10 ENCOUNTER — APPOINTMENT (OUTPATIENT)
Dept: CT IMAGING | Facility: HOSPITAL | Age: 78
DRG: 190 | End: 2023-07-10
Attending: EMERGENCY MEDICINE
Payer: COMMERCIAL

## 2023-07-10 ENCOUNTER — OFFICE VISIT (OUTPATIENT)
Dept: FAMILY MEDICINE | Facility: CLINIC | Age: 78
End: 2023-07-10
Payer: COMMERCIAL

## 2023-07-10 ENCOUNTER — HOSPITAL ENCOUNTER (INPATIENT)
Facility: HOSPITAL | Age: 78
LOS: 1 days | Discharge: HOME | DRG: 190 | End: 2023-07-12
Attending: EMERGENCY MEDICINE | Admitting: FAMILY MEDICINE
Payer: COMMERCIAL

## 2023-07-10 VITALS
SYSTOLIC BLOOD PRESSURE: 111 MMHG | HEIGHT: 58 IN | WEIGHT: 135 LBS | DIASTOLIC BLOOD PRESSURE: 73 MMHG | BODY MASS INDEX: 28.34 KG/M2 | HEART RATE: 68 BPM | RESPIRATION RATE: 24 BRPM | TEMPERATURE: 96.6 F | OXYGEN SATURATION: 96 %
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
## 2023-07-10 DIAGNOSIS — J18.9 PNEUMONIA OF LEFT LUNG DUE TO INFECTIOUS ORGANISM, UNSPECIFIED PART OF LUNG: ICD-10-CM

## 2023-07-10 DIAGNOSIS — J44.1 COPD EXACERBATION (H): Primary | ICD-10-CM

## 2023-07-10 DIAGNOSIS — R06.2 WHEEZING: ICD-10-CM

## 2023-07-10 DIAGNOSIS — F43.9 SITUATIONAL STRESS: Primary | ICD-10-CM

## 2023-07-10 DIAGNOSIS — E04.1 NODULE OF RIGHT LOBE OF THYROID GLAND: ICD-10-CM

## 2023-07-10 DIAGNOSIS — J44.9 CHRONIC OBSTRUCTIVE PULMONARY DISEASE, UNSPECIFIED COPD TYPE (H): ICD-10-CM

## 2023-07-10 LAB
ALBUMIN SERPL BCG-MCNC: 3.1 G/DL (ref 3.5–5.2)
ALP SERPL-CCNC: 80 U/L (ref 35–104)
ALT SERPL W P-5'-P-CCNC: 15 U/L (ref 0–50)
ANION GAP SERPL CALCULATED.3IONS-SCNC: 9 MMOL/L (ref 7–15)
AST SERPL W P-5'-P-CCNC: 26 U/L (ref 0–45)
BASOPHILS # BLD AUTO: 0.1 10E3/UL (ref 0–0.2)
BASOPHILS NFR BLD AUTO: 1 %
BILIRUB SERPL-MCNC: 0.3 MG/DL
BUN SERPL-MCNC: 14.8 MG/DL (ref 8–23)
CALCIUM SERPL-MCNC: 8.5 MG/DL (ref 8.8–10.2)
CHLORIDE SERPL-SCNC: 103 MMOL/L (ref 98–107)
CREAT SERPL-MCNC: 0.73 MG/DL (ref 0.51–0.95)
DEPRECATED HCO3 PLAS-SCNC: 24 MMOL/L (ref 22–29)
EOSINOPHIL # BLD AUTO: 0.2 10E3/UL (ref 0–0.7)
EOSINOPHIL NFR BLD AUTO: 2 %
ERYTHROCYTE [DISTWIDTH] IN BLOOD BY AUTOMATED COUNT: 14.3 % (ref 10–15)
GFR SERPL CREATININE-BSD FRML MDRD: 84 ML/MIN/1.73M2
GLUCOSE SERPL-MCNC: 84 MG/DL (ref 70–99)
HCT VFR BLD AUTO: 36.7 % (ref 35–47)
HGB BLD-MCNC: 11.8 G/DL (ref 11.7–15.7)
IMM GRANULOCYTES # BLD: 0.1 10E3/UL
IMM GRANULOCYTES NFR BLD: 1 %
LYMPHOCYTES # BLD AUTO: 2.1 10E3/UL (ref 0.8–5.3)
LYMPHOCYTES NFR BLD AUTO: 20 %
MCH RBC QN AUTO: 27 PG (ref 26.5–33)
MCHC RBC AUTO-ENTMCNC: 32.2 G/DL (ref 31.5–36.5)
MCV RBC AUTO: 84 FL (ref 78–100)
MONOCYTES # BLD AUTO: 1 10E3/UL (ref 0–1.3)
MONOCYTES NFR BLD AUTO: 10 %
NEUTROPHILS # BLD AUTO: 7.3 10E3/UL (ref 1.6–8.3)
NEUTROPHILS NFR BLD AUTO: 66 %
NRBC # BLD AUTO: 0 10E3/UL
NRBC BLD AUTO-RTO: 0 /100
NT-PROBNP SERPL-MCNC: 181 PG/ML (ref 0–1800)
PLATELET # BLD AUTO: 349 10E3/UL (ref 150–450)
POTASSIUM SERPL-SCNC: 4 MMOL/L (ref 3.4–5.3)
PROT SERPL-MCNC: 7.3 G/DL (ref 6.4–8.3)
RBC # BLD AUTO: 4.37 10E6/UL (ref 3.8–5.2)
SODIUM SERPL-SCNC: 136 MMOL/L (ref 136–145)
TROPONIN T SERPL HS-MCNC: 13 NG/L
WBC # BLD AUTO: 10.7 10E3/UL (ref 4–11)

## 2023-07-10 PROCEDURE — 250N000009 HC RX 250

## 2023-07-10 PROCEDURE — G0378 HOSPITAL OBSERVATION PER HR: HCPCS

## 2023-07-10 PROCEDURE — 250N000011 HC RX IP 250 OP 636: Mod: JZ

## 2023-07-10 PROCEDURE — 96372 THER/PROPH/DIAG INJ SC/IM: CPT

## 2023-07-10 PROCEDURE — 999N000248 HC STATISTIC IV INSERT WITH US BY RN

## 2023-07-10 PROCEDURE — 93005 ELECTROCARDIOGRAM TRACING: CPT | Performed by: EMERGENCY MEDICINE

## 2023-07-10 PROCEDURE — 84484 ASSAY OF TROPONIN QUANT: CPT | Performed by: EMERGENCY MEDICINE

## 2023-07-10 PROCEDURE — 80053 COMPREHEN METABOLIC PANEL: CPT | Performed by: EMERGENCY MEDICINE

## 2023-07-10 PROCEDURE — 99215 OFFICE O/P EST HI 40 MIN: CPT | Mod: GC

## 2023-07-10 PROCEDURE — 96374 THER/PROPH/DIAG INJ IV PUSH: CPT | Mod: 59

## 2023-07-10 PROCEDURE — 999N000104 CT THORACIC SPINE RECONSTRUCTED

## 2023-07-10 PROCEDURE — 96375 TX/PRO/DX INJ NEW DRUG ADDON: CPT

## 2023-07-10 PROCEDURE — 99285 EMERGENCY DEPT VISIT HI MDM: CPT | Mod: 25

## 2023-07-10 PROCEDURE — 258N000003 HC RX IP 258 OP 636: Performed by: EMERGENCY MEDICINE

## 2023-07-10 PROCEDURE — 72131 CT LUMBAR SPINE W/O DYE: CPT

## 2023-07-10 PROCEDURE — 36415 COLL VENOUS BLD VENIPUNCTURE: CPT | Performed by: EMERGENCY MEDICINE

## 2023-07-10 PROCEDURE — 85014 HEMATOCRIT: CPT | Performed by: EMERGENCY MEDICINE

## 2023-07-10 PROCEDURE — 96367 TX/PROPH/DG ADDL SEQ IV INF: CPT

## 2023-07-10 PROCEDURE — 96365 THER/PROPH/DIAG IV INF INIT: CPT

## 2023-07-10 PROCEDURE — 99223 1ST HOSP IP/OBS HIGH 75: CPT | Mod: AI

## 2023-07-10 PROCEDURE — 94640 AIRWAY INHALATION TREATMENT: CPT | Mod: 76

## 2023-07-10 PROCEDURE — 83880 ASSAY OF NATRIURETIC PEPTIDE: CPT | Performed by: EMERGENCY MEDICINE

## 2023-07-10 PROCEDURE — 250N000013 HC RX MED GY IP 250 OP 250 PS 637

## 2023-07-10 PROCEDURE — 87040 BLOOD CULTURE FOR BACTERIA: CPT | Performed by: EMERGENCY MEDICINE

## 2023-07-10 PROCEDURE — 250N000011 HC RX IP 250 OP 636: Mod: JZ | Performed by: EMERGENCY MEDICINE

## 2023-07-10 PROCEDURE — 250N000009 HC RX 250: Performed by: EMERGENCY MEDICINE

## 2023-07-10 PROCEDURE — 70491 CT SOFT TISSUE NECK W/DYE: CPT

## 2023-07-10 PROCEDURE — 71275 CT ANGIOGRAPHY CHEST: CPT

## 2023-07-10 PROCEDURE — 250N000011 HC RX IP 250 OP 636: Performed by: EMERGENCY MEDICINE

## 2023-07-10 RX ORDER — ASPIRIN 81 MG/1
81 TABLET ORAL DAILY
Status: DISCONTINUED | OUTPATIENT
Start: 2023-07-10 | End: 2023-07-12 | Stop reason: HOSPADM

## 2023-07-10 RX ORDER — ATORVASTATIN CALCIUM 40 MG/1
40 TABLET, FILM COATED ORAL EVERY EVENING
Status: DISCONTINUED | OUTPATIENT
Start: 2023-07-10 | End: 2023-07-12 | Stop reason: HOSPADM

## 2023-07-10 RX ORDER — IPRATROPIUM BROMIDE AND ALBUTEROL SULFATE 2.5; .5 MG/3ML; MG/3ML
3 SOLUTION RESPIRATORY (INHALATION)
Status: DISCONTINUED | OUTPATIENT
Start: 2023-07-11 | End: 2023-07-12 | Stop reason: HOSPADM

## 2023-07-10 RX ORDER — METHYLPREDNISOLONE SODIUM SUCCINATE 125 MG/2ML
125 INJECTION, POWDER, LYOPHILIZED, FOR SOLUTION INTRAMUSCULAR; INTRAVENOUS ONCE
Status: COMPLETED | OUTPATIENT
Start: 2023-07-10 | End: 2023-07-10

## 2023-07-10 RX ORDER — ALBUTEROL SULFATE 90 UG/1
2 AEROSOL, METERED RESPIRATORY (INHALATION) EVERY 6 HOURS PRN
Qty: 18 G | Refills: 3 | Status: CANCELLED | OUTPATIENT
Start: 2023-07-10

## 2023-07-10 RX ORDER — ACETAMINOPHEN 325 MG/1
975 TABLET ORAL EVERY 8 HOURS
Status: DISCONTINUED | OUTPATIENT
Start: 2023-07-10 | End: 2023-07-12 | Stop reason: HOSPADM

## 2023-07-10 RX ORDER — ONDANSETRON 4 MG/1
4 TABLET, ORALLY DISINTEGRATING ORAL EVERY 6 HOURS PRN
Status: DISCONTINUED | OUTPATIENT
Start: 2023-07-10 | End: 2023-07-12 | Stop reason: HOSPADM

## 2023-07-10 RX ORDER — PROCHLORPERAZINE MALEATE 5 MG
5 TABLET ORAL EVERY 6 HOURS PRN
Status: DISCONTINUED | OUTPATIENT
Start: 2023-07-10 | End: 2023-07-12 | Stop reason: HOSPADM

## 2023-07-10 RX ORDER — LIDOCAINE 40 MG/G
CREAM TOPICAL
Status: DISCONTINUED | OUTPATIENT
Start: 2023-07-10 | End: 2023-07-12 | Stop reason: HOSPADM

## 2023-07-10 RX ORDER — CEFTRIAXONE 1 G/1
1 INJECTION, POWDER, FOR SOLUTION INTRAMUSCULAR; INTRAVENOUS ONCE
Status: COMPLETED | OUTPATIENT
Start: 2023-07-10 | End: 2023-07-10

## 2023-07-10 RX ORDER — POLYETHYLENE GLYCOL 3350 17 G/17G
17 POWDER, FOR SOLUTION ORAL DAILY PRN
Status: DISCONTINUED | OUTPATIENT
Start: 2023-07-10 | End: 2023-07-12 | Stop reason: HOSPADM

## 2023-07-10 RX ORDER — AMOXICILLIN 250 MG
2 CAPSULE ORAL 2 TIMES DAILY PRN
Status: DISCONTINUED | OUTPATIENT
Start: 2023-07-10 | End: 2023-07-12 | Stop reason: HOSPADM

## 2023-07-10 RX ORDER — PROCHLORPERAZINE 25 MG
12.5 SUPPOSITORY, RECTAL RECTAL EVERY 12 HOURS PRN
Status: DISCONTINUED | OUTPATIENT
Start: 2023-07-10 | End: 2023-07-12 | Stop reason: HOSPADM

## 2023-07-10 RX ORDER — CEFTRIAXONE 1 G/1
1 INJECTION, POWDER, FOR SOLUTION INTRAMUSCULAR; INTRAVENOUS EVERY 24 HOURS
Status: DISCONTINUED | OUTPATIENT
Start: 2023-07-11 | End: 2023-07-12 | Stop reason: HOSPADM

## 2023-07-10 RX ORDER — PREDNISONE 20 MG/1
40 TABLET ORAL DAILY
Status: DISCONTINUED | OUTPATIENT
Start: 2023-07-11 | End: 2023-07-12 | Stop reason: HOSPADM

## 2023-07-10 RX ORDER — ONDANSETRON 2 MG/ML
4 INJECTION INTRAMUSCULAR; INTRAVENOUS EVERY 6 HOURS PRN
Status: DISCONTINUED | OUTPATIENT
Start: 2023-07-10 | End: 2023-07-12 | Stop reason: HOSPADM

## 2023-07-10 RX ORDER — AZITHROMYCIN 250 MG/1
250 TABLET, FILM COATED ORAL DAILY
Status: DISCONTINUED | OUTPATIENT
Start: 2023-07-11 | End: 2023-07-12 | Stop reason: HOSPADM

## 2023-07-10 RX ORDER — AMLODIPINE BESYLATE 5 MG/1
5 TABLET ORAL DAILY
Status: DISCONTINUED | OUTPATIENT
Start: 2023-07-10 | End: 2023-07-12 | Stop reason: HOSPADM

## 2023-07-10 RX ORDER — IOPAMIDOL 755 MG/ML
90 INJECTION, SOLUTION INTRAVASCULAR ONCE
Status: COMPLETED | OUTPATIENT
Start: 2023-07-10 | End: 2023-07-10

## 2023-07-10 RX ORDER — AMOXICILLIN 250 MG
1 CAPSULE ORAL 2 TIMES DAILY PRN
Status: DISCONTINUED | OUTPATIENT
Start: 2023-07-10 | End: 2023-07-12 | Stop reason: HOSPADM

## 2023-07-10 RX ORDER — IPRATROPIUM BROMIDE AND ALBUTEROL SULFATE 2.5; .5 MG/3ML; MG/3ML
3 SOLUTION RESPIRATORY (INHALATION)
Status: DISCONTINUED | OUTPATIENT
Start: 2023-07-10 | End: 2023-07-10

## 2023-07-10 RX ORDER — ENOXAPARIN SODIUM 100 MG/ML
40 INJECTION SUBCUTANEOUS EVERY 24 HOURS
Status: DISCONTINUED | OUTPATIENT
Start: 2023-07-10 | End: 2023-07-12 | Stop reason: HOSPADM

## 2023-07-10 RX ORDER — ALBUTEROL SULFATE 0.83 MG/ML
2.5 SOLUTION RESPIRATORY (INHALATION)
Status: DISCONTINUED | OUTPATIENT
Start: 2023-07-10 | End: 2023-07-11

## 2023-07-10 RX ORDER — IPRATROPIUM BROMIDE AND ALBUTEROL SULFATE 2.5; .5 MG/3ML; MG/3ML
3 SOLUTION RESPIRATORY (INHALATION) ONCE
Status: COMPLETED | OUTPATIENT
Start: 2023-07-10 | End: 2023-07-10

## 2023-07-10 RX ORDER — IPRATROPIUM BROMIDE AND ALBUTEROL SULFATE 2.5; .5 MG/3ML; MG/3ML
1 SOLUTION RESPIRATORY (INHALATION) EVERY 6 HOURS PRN
Qty: 360 ML | Refills: 4 | Status: CANCELLED | OUTPATIENT
Start: 2023-07-10

## 2023-07-10 RX ADMIN — ATORVASTATIN CALCIUM 40 MG: 40 TABLET, FILM COATED ORAL at 20:30

## 2023-07-10 RX ADMIN — Medication 81 MG: at 16:54

## 2023-07-10 RX ADMIN — METHYLPREDNISOLONE SODIUM SUCCINATE 125 MG: 125 INJECTION, POWDER, FOR SOLUTION INTRAMUSCULAR; INTRAVENOUS at 10:09

## 2023-07-10 RX ADMIN — ENOXAPARIN SODIUM 40 MG: 40 INJECTION SUBCUTANEOUS at 20:31

## 2023-07-10 RX ADMIN — AMLODIPINE BESYLATE 5 MG: 5 TABLET ORAL at 16:54

## 2023-07-10 RX ADMIN — AZITHROMYCIN MONOHYDRATE 500 MG: 500 INJECTION, POWDER, LYOPHILIZED, FOR SOLUTION INTRAVENOUS at 14:23

## 2023-07-10 RX ADMIN — CEFTRIAXONE SODIUM 1 G: 1 INJECTION, POWDER, FOR SOLUTION INTRAMUSCULAR; INTRAVENOUS at 13:39

## 2023-07-10 RX ADMIN — IOPAMIDOL 90 ML: 755 INJECTION, SOLUTION INTRAVENOUS at 11:27

## 2023-07-10 RX ADMIN — IPRATROPIUM BROMIDE AND ALBUTEROL SULFATE 3 ML: .5; 3 SOLUTION RESPIRATORY (INHALATION) at 10:22

## 2023-07-10 RX ADMIN — IPRATROPIUM BROMIDE AND ALBUTEROL SULFATE 3 ML: 2.5; .5 SOLUTION RESPIRATORY (INHALATION) at 20:32

## 2023-07-10 RX ADMIN — IPRATROPIUM BROMIDE AND ALBUTEROL SULFATE 3 ML: 2.5; .5 SOLUTION RESPIRATORY (INHALATION) at 16:12

## 2023-07-10 RX ADMIN — ACETAMINOPHEN 975 MG: 325 TABLET ORAL at 16:54

## 2023-07-10 ASSESSMENT — ACTIVITIES OF DAILY LIVING (ADL)
ADLS_ACUITY_SCORE: 37
DEPENDENT_IADLS:: CLEANING;COOKING;LAUNDRY;TRANSPORTATION
ADLS_ACUITY_SCORE: 37

## 2023-07-10 ASSESSMENT — ENCOUNTER SYMPTOMS
TROUBLE SWALLOWING: 0
SHORTNESS OF BREATH: 1
WHEEZING: 1
COUGH: 1

## 2023-07-10 NOTE — ED NOTES
RESPIRATORY CARE NOTE     Patient was on room air and had an Sp02 of 96%. They received Duoneb x1.   Breath sounds prior to treatment where diminished bilaterally with scattered expiratory wheezing and post treatment increased aeration.       RT will continue to assess and monitor cardiopulmonary status.     Corin Rivera, RT

## 2023-07-10 NOTE — ED NOTES
Expected Patient Referral to ED  8:38 AM    Referring Clinic/Provider:  [facility] Dr. Brewer    Reason for referral/Clinical facts:  COPD, VSS. Already in route. Recently seen in clinic and was given zpak and steroids. Completed steroids, but symptoms are now worse. Coming to ER for likely admission.     Recommendations provided:  Send to ED for further evaluation    Caller was informed that this institution does possess the capabilities and/or resources to provide for patient and should be transferred to our facility.    Discussed that if direct admit is sought and any hurdles are encountered, this ED would be happy to see the patient and evaluate.    Informed caller that recommendations provided are recommendations based only on the facts provided and that they responsible to accept or reject the advice, or to seek a formal in person consultation as needed and that this ED will see/treat patient should they arrive.      Black Soria MD  [facility] EMERGENCY DEPARTMENT  [address] [place] [id number]  [phone]       Black Soria MD  07/10/23 0840
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
## 2023-07-10 NOTE — ED TRIAGE NOTES
"Patient here with daughter. History of COPD, increasing SOB for last 3 weeks. Went to clinic today, sent to ER. Daughter states patient does have hx of lymphoma in 2011, \"I would like to make sure it is okay\" is using inhalers at home      "

## 2023-07-10 NOTE — PROGRESS NOTES
Faculty Supervision of Residents   I have examined this patient and the medical care has been evaluated and discussed with the resident. See resident note outlining our discussion.    Continue SOB despite treatment for COPD exacerbation, will send to ED for further eval.     Radha Diana MD
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
## 2023-07-10 NOTE — PHARMACY-ADMISSION MEDICATION HISTORY
Pharmacist Admission Medication History    Admission medication history is complete. The information provided in this note is only as accurate as the sources available at the time of the update.    Medication reconciliation/reorder completed by provider prior to medication history? No    Information Source(s): Patient, Family member and CareEverywhere/SureScripts via phone    Pertinent Information:       Pulmonology outpatient regimen, taken 7/6-7/10              - Azithromycin 500 mg x 1 day followed by 250 mg x 4 days               - Prednisone 40 mg x 5 days               - Duoneb q6h PRN     Changes made to PTA medication list:    Added: None    Deleted: None    Changed: None    Medication Affordability:  Not including over the counter (OTC) medications, was there a time in the past 3 months when you did not take your medications as prescribed because of cost?: No    Allergies reviewed with patient and updates made in EHR: yes    Medication History Completed By: Danuta Sutton [facility] 7/10/2023 1:20 PM    Prior to Admission medications    Medication Sig Last Dose Taking? Auth Provider Long Term End Date   acetaminophen (TYLENOL) 325 MG tablet Take 650 mg by mouth every 6 hours as needed for mild pain, fever or headaches Past Week Yes Provider, Historical     albuterol (PROAIR HFA/PROVENTIL HFA/VENTOLIN HFA) 108 (90 Base) MCG/ACT inhaler Inhale 2 puffs into the lungs every 6 hours as needed for shortness of breath or wheezing 7/9/2023 at pm Yes Manoj Burch MD Yes    amLODIPine (NORVASC) 5 MG tablet Take 1 tablet (5 mg) by mouth daily 7/9/2023 at am Yes Manoj Burch MD Yes    aspirin 81 MG EC tablet Take 81 mg by mouth daily 7/9/2023 at am Yes Unknown, Entered By History     atorvastatin (LIPITOR) 40 MG tablet Take 1 tablet (40 mg) by mouth daily 7/9/2023 at pm Yes Manoj Burch MD Yes    carboxymethylcellulose PF (REFRESH LIQUIGEL) 1 % ophthalmic gel Place 1 drop into both eyes 2 times daily as needed for dry  eyes 7/9/2023 at am Yes Manoj Burch MD     ipratropium - albuterol 0.5 mg/2.5 mg/3 mL (DUONEB) 0.5-2.5 (3) MG/3ML neb solution Take 1 vial (3 mLs) by nebulization every 6 hours as needed for shortness of breath or wheezing 7/9/2023 at pm Yes Radha Diana MD Yes    latanoprost (XALATAN) 0.005 % ophthalmic solution Place 1 drop into both eyes daily 7/9/2023 at am Yes Manoj Burch MD Yes    tiotropium (SPIRIVA RESPIMAT) 2.5 MCG/ACT inhaler Inhale 2 puffs into the lungs daily 7/9/2023 at am Yes Catalina Rivera MD Yes
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
## 2023-07-11 ENCOUNTER — APPOINTMENT (OUTPATIENT)
Dept: OCCUPATIONAL THERAPY | Facility: HOSPITAL | Age: 78
DRG: 190 | End: 2023-07-11
Payer: COMMERCIAL

## 2023-07-11 ENCOUNTER — APPOINTMENT (OUTPATIENT)
Dept: PHYSICAL THERAPY | Facility: HOSPITAL | Age: 78
DRG: 190 | End: 2023-07-11
Payer: COMMERCIAL

## 2023-07-11 LAB
ANION GAP SERPL CALCULATED.3IONS-SCNC: 13 MMOL/L (ref 7–15)
ATRIAL RATE - MUSE: 71 BPM
BUN SERPL-MCNC: 14.3 MG/DL (ref 8–23)
CALCIUM SERPL-MCNC: 9 MG/DL (ref 8.8–10.2)
CHLORIDE SERPL-SCNC: 101 MMOL/L (ref 98–107)
CREAT SERPL-MCNC: 0.77 MG/DL (ref 0.51–0.95)
DEPRECATED HCO3 PLAS-SCNC: 22 MMOL/L (ref 22–29)
DIASTOLIC BLOOD PRESSURE - MUSE: NORMAL MMHG
ERYTHROCYTE [DISTWIDTH] IN BLOOD BY AUTOMATED COUNT: 14 % (ref 10–15)
GFR SERPL CREATININE-BSD FRML MDRD: 79 ML/MIN/1.73M2
GLUCOSE SERPL-MCNC: 94 MG/DL (ref 70–99)
HCT VFR BLD AUTO: 37.1 % (ref 35–47)
HGB BLD-MCNC: 11.8 G/DL (ref 11.7–15.7)
HOLD SPECIMEN: NORMAL
INTERPRETATION ECG - MUSE: NORMAL
LACTATE SERPL-SCNC: 2.4 MMOL/L (ref 0.7–2)
LACTATE SERPL-SCNC: 2.6 MMOL/L (ref 0.7–2)
LACTATE SERPL-SCNC: 2.9 MMOL/L (ref 0.7–2)
LACTATE SERPL-SCNC: 3.1 MMOL/L (ref 0.7–2)
MCH RBC QN AUTO: 26.8 PG (ref 26.5–33)
MCHC RBC AUTO-ENTMCNC: 31.8 G/DL (ref 31.5–36.5)
MCV RBC AUTO: 84 FL (ref 78–100)
P AXIS - MUSE: 73 DEGREES
PLATELET # BLD AUTO: 380 10E3/UL (ref 150–450)
POTASSIUM SERPL-SCNC: 3.6 MMOL/L (ref 3.4–5.3)
PR INTERVAL - MUSE: 148 MS
QRS DURATION - MUSE: 84 MS
QT - MUSE: 376 MS
QTC - MUSE: 408 MS
R AXIS - MUSE: 54 DEGREES
RBC # BLD AUTO: 4.4 10E6/UL (ref 3.8–5.2)
SODIUM SERPL-SCNC: 136 MMOL/L (ref 136–145)
SYSTOLIC BLOOD PRESSURE - MUSE: NORMAL MMHG
T AXIS - MUSE: 68 DEGREES
VENTRICULAR RATE- MUSE: 71 BPM
WBC # BLD AUTO: 24.4 10E3/UL (ref 4–11)

## 2023-07-11 PROCEDURE — 99232 SBSQ HOSP IP/OBS MODERATE 35: CPT | Mod: GC

## 2023-07-11 PROCEDURE — 250N000012 HC RX MED GY IP 250 OP 636 PS 637

## 2023-07-11 PROCEDURE — 96361 HYDRATE IV INFUSION ADD-ON: CPT

## 2023-07-11 PROCEDURE — 80048 BASIC METABOLIC PNL TOTAL CA: CPT

## 2023-07-11 PROCEDURE — 97535 SELF CARE MNGMENT TRAINING: CPT | Mod: GO

## 2023-07-11 PROCEDURE — 258N000003 HC RX IP 258 OP 636

## 2023-07-11 PROCEDURE — 83605 ASSAY OF LACTIC ACID: CPT

## 2023-07-11 PROCEDURE — 97530 THERAPEUTIC ACTIVITIES: CPT | Mod: GP

## 2023-07-11 PROCEDURE — 36415 COLL VENOUS BLD VENIPUNCTURE: CPT

## 2023-07-11 PROCEDURE — 96366 THER/PROPH/DIAG IV INF ADDON: CPT

## 2023-07-11 PROCEDURE — 85041 AUTOMATED RBC COUNT: CPT

## 2023-07-11 PROCEDURE — 250N000013 HC RX MED GY IP 250 OP 250 PS 637

## 2023-07-11 PROCEDURE — 250N000009 HC RX 250

## 2023-07-11 PROCEDURE — 97161 PT EVAL LOW COMPLEX 20 MIN: CPT | Mod: GP

## 2023-07-11 PROCEDURE — G0378 HOSPITAL OBSERVATION PER HR: HCPCS

## 2023-07-11 PROCEDURE — 999N000157 HC STATISTIC RCP TIME EA 10 MIN

## 2023-07-11 PROCEDURE — 120N000001 HC R&B MED SURG/OB

## 2023-07-11 PROCEDURE — 94640 AIRWAY INHALATION TREATMENT: CPT

## 2023-07-11 PROCEDURE — 250N000009 HC RX 250: Performed by: FAMILY MEDICINE

## 2023-07-11 PROCEDURE — 97166 OT EVAL MOD COMPLEX 45 MIN: CPT | Mod: GO

## 2023-07-11 PROCEDURE — 250N000011 HC RX IP 250 OP 636: Mod: JZ

## 2023-07-11 PROCEDURE — 94640 AIRWAY INHALATION TREATMENT: CPT | Mod: 76

## 2023-07-11 RX ORDER — SODIUM CHLORIDE, SODIUM LACTATE, POTASSIUM CHLORIDE, CALCIUM CHLORIDE 600; 310; 30; 20 MG/100ML; MG/100ML; MG/100ML; MG/100ML
INJECTION, SOLUTION INTRAVENOUS CONTINUOUS
Status: DISCONTINUED | OUTPATIENT
Start: 2023-07-11 | End: 2023-07-11

## 2023-07-11 RX ORDER — ALBUTEROL SULFATE 0.83 MG/ML
2.5 SOLUTION RESPIRATORY (INHALATION) EVERY 4 HOURS PRN
Status: DISCONTINUED | OUTPATIENT
Start: 2023-07-11 | End: 2023-07-12 | Stop reason: HOSPADM

## 2023-07-11 RX ADMIN — ONDANSETRON 4 MG: 4 TABLET, ORALLY DISINTEGRATING ORAL at 12:05

## 2023-07-11 RX ADMIN — Medication 81 MG: at 08:29

## 2023-07-11 RX ADMIN — POLYETHYLENE GLYCOL 3350 17 G: 17 POWDER, FOR SOLUTION ORAL at 16:41

## 2023-07-11 RX ADMIN — SODIUM CHLORIDE, POTASSIUM CHLORIDE, SODIUM LACTATE AND CALCIUM CHLORIDE 500 ML: 600; 310; 30; 20 INJECTION, SOLUTION INTRAVENOUS at 17:01

## 2023-07-11 RX ADMIN — IPRATROPIUM BROMIDE AND ALBUTEROL SULFATE 3 ML: 2.5; .5 SOLUTION RESPIRATORY (INHALATION) at 15:07

## 2023-07-11 RX ADMIN — ATORVASTATIN CALCIUM 40 MG: 40 TABLET, FILM COATED ORAL at 21:14

## 2023-07-11 RX ADMIN — ALBUTEROL SULFATE 2.5 MG: 2.5 SOLUTION RESPIRATORY (INHALATION) at 16:42

## 2023-07-11 RX ADMIN — IPRATROPIUM BROMIDE AND ALBUTEROL SULFATE 3 ML: 2.5; .5 SOLUTION RESPIRATORY (INHALATION) at 07:02

## 2023-07-11 RX ADMIN — AZITHROMYCIN 250 MG: 250 TABLET, FILM COATED ORAL at 08:29

## 2023-07-11 RX ADMIN — IPRATROPIUM BROMIDE AND ALBUTEROL SULFATE 3 ML: 2.5; .5 SOLUTION RESPIRATORY (INHALATION) at 19:15

## 2023-07-11 RX ADMIN — ACETAMINOPHEN 975 MG: 325 TABLET ORAL at 08:29

## 2023-07-11 RX ADMIN — PREDNISONE 40 MG: 20 TABLET ORAL at 08:29

## 2023-07-11 RX ADMIN — IPRATROPIUM BROMIDE AND ALBUTEROL SULFATE 3 ML: 2.5; .5 SOLUTION RESPIRATORY (INHALATION) at 11:01

## 2023-07-11 RX ADMIN — SODIUM CHLORIDE, POTASSIUM CHLORIDE, SODIUM LACTATE AND CALCIUM CHLORIDE 1000 ML: 600; 310; 30; 20 INJECTION, SOLUTION INTRAVENOUS at 12:48

## 2023-07-11 RX ADMIN — AMLODIPINE BESYLATE 5 MG: 5 TABLET ORAL at 08:29

## 2023-07-11 RX ADMIN — ENOXAPARIN SODIUM 40 MG: 40 INJECTION SUBCUTANEOUS at 21:14

## 2023-07-11 RX ADMIN — ALBUTEROL SULFATE 2.5 MG: 2.5 SOLUTION RESPIRATORY (INHALATION) at 01:45

## 2023-07-11 RX ADMIN — CEFTRIAXONE SODIUM 1 G: 1 INJECTION, POWDER, FOR SOLUTION INTRAMUSCULAR; INTRAVENOUS at 14:21

## 2023-07-11 RX ADMIN — ACETAMINOPHEN 975 MG: 325 TABLET ORAL at 00:20

## 2023-07-11 ASSESSMENT — ACTIVITIES OF DAILY LIVING (ADL)
ADLS_ACUITY_SCORE: 37
ADLS_ACUITY_SCORE: 37
PREVIOUS_RESPONSIBILITIES: MEDICATION MANAGEMENT
ADLS_ACUITY_SCORE: 37

## 2023-07-11 NOTE — PROGRESS NOTES
"PRIMARY DIAGNOSIS: \"GENERIC\" NURSING  OUTPATIENT/OBSERVATION GOALS TO BE MET BEFORE DISCHARGE:  1. ADLs back to baseline: Yes    2. Activity and level of assistance: Up with standby assistance.    3. Pain status: Pain free.    4. Return to near baseline physical activity: Yes     Discharge Planner Nurse   Safe discharge environment identified: No  Barriers to discharge: Yes- to talk with daughter       Entered by: Renita Snow RN 07/11/2023 10:20 AM     Please review provider order for any additional goals.   Nurse to notify provider when observation goals have been met and patient is ready for discharge.  "

## 2023-07-11 NOTE — PROGRESS NOTES
"RESPIRATORY CARE NOTE:    PT is currently on room air with an SpO2 of 100%.  Breathing pattern dyspnea on exertion Breath sounds expiratory wheezes Cough type infrequent, productive   Duoneb nebulizer given x2.  PT tolerated treatments well. PT states she is shaky post neb.  RT will continue to follow.      /69   Pulse 97   Temp 97.7  F (36.5  C) (Oral)   Resp (!) 32   Ht 1.473 m (4' 10\")   Wt 61.7 kg (136 lb)   SpO2 100%   BMI 28.42 kg/m      Adis Man, RT  7/11/2023      "

## 2023-07-11 NOTE — PROGRESS NOTES
Sepsis protocol fired, lactic acid ordered. Vitals done. Lab called to inform me that is is 2.9. Page sent to Dr. Batista to let him know lactic level.

## 2023-07-11 NOTE — SIGNIFICANT EVENT
Paged by nursing regarding new lactic acid of 2.9.    Went to evaluate patient in the ED. The patient is afebrile, mildly tachycardic to 104 which is not new, normotensive and has a respiratory rate of 24 and is saturating on room air. She has a new leukocytosis today that is likely due to steroid treatment the past 2 days.     Patient does not endorse any new symptoms since evaluation this AM. Does say that she thinks she may be dehydrated. No chest pain, headache, new difficulty breathing, change in cognition.     PE: Patient has mucous membranes, capillary refill of 4-5 seconds, and skin tenting.   Cardiac: Tachycardic rate and rhythym without murmur.  Resp: No increased work of breathing. Moving air well. No wheezes or crackles auscultated.  Neuro: Alert and oriented.     Plan: Giving patient 1 L of lactated ringers and will recheck lactic acid at 1530.
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
## 2023-07-11 NOTE — PROGRESS NOTES
"PRIMARY DIAGNOSIS: \"GENERIC\" NURSING  OUTPATIENT/OBSERVATION GOALS TO BE MET BEFORE DISCHARGE:  1. ADLs back to baseline: Yes    2. Activity and level of assistance: Up with standby assistance.    3. Pain status: Pain free.    4. Return to near baseline physical activity: Yes     Discharge Planner Nurse   Safe discharge environment identified: Yes  Barriers to discharge: Yes       Entered by: Melvi Robbins RN 07/11/2023 5:47 AM     Please review provider order for any additional goals.   Nurse to notify provider when observation goals have been met and patient is ready for discharge.    Lung sounds improved with breathing treatment overnight. No complaints of pain.     Melvi Robbins RN   "

## 2023-07-11 NOTE — TREATMENT PLAN
[facility]AT Treatment Plan    Patient Score: 7  Patient Acuity: 4    Clinical Indication for Therapy: wheezing, SOB    Therapy Ordered: QID duoneb    Assessment Summary: Patient is on room air with expiratory wheeze. Nebs will be scheduled QID.    Kenneth H. Kjellberg  7/10/2023

## 2023-07-11 NOTE — PROGRESS NOTES
"PRIMARY DIAGNOSIS: \"GENERIC\" NURSING  OUTPATIENT/OBSERVATION GOALS TO BE MET BEFORE DISCHARGE:  1. ADLs back to baseline: Yes    2. Activity and level of assistance: Up with standby assistance.    3. Pain status: Pain free.    4. Return to near baseline physical activity: Yes     Discharge Planner Nurse   Safe discharge environment identified: Yes  Barriers to discharge: Yes       Entered by: Melvi Robbins RN 07/11/2023 1:31 AM     Please review provider order for any additional goals.   Nurse to notify provider when observation goals have been met and patient is ready for discharge.    Shift 23:00-07:30:   VSS. Patient on room air. SBA to bathroom with cane. Patient has expiratory wheezes. Breathing treatment requested from RT. No complaints of pain.     Melvi Robbins RN   "
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
## 2023-07-11 NOTE — PLAN OF CARE
Goal Outcome Evaluation:      Plan of Care Reviewed With: patient    Problem: COPD (Chronic Obstructive Pulmonary Disease)  Goal: Effective Oxygenation and Ventilation  Outcome: Progressing  Intervention: Promote Airway Secretion Clearance  Recent Flowsheet Documentation  Taken 7/11/2023 0829 by Renita Snow, RN  Activity Management:   activity adjusted per tolerance   activity encouraged   Pts. Oxygen saturations adequate on room air. Pt. Receiving scheduled nebulizers. Pt. Gets short of breath with movement. Pulmonology consulted.   Family here with patient throughout the day.     Renita Snow, RN
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
## 2023-07-12 ENCOUNTER — APPOINTMENT (OUTPATIENT)
Dept: OCCUPATIONAL THERAPY | Facility: HOSPITAL | Age: 78
DRG: 190 | End: 2023-07-12
Payer: COMMERCIAL

## 2023-07-12 ENCOUNTER — APPOINTMENT (OUTPATIENT)
Dept: PHYSICAL THERAPY | Facility: HOSPITAL | Age: 78
DRG: 190 | End: 2023-07-12
Payer: COMMERCIAL

## 2023-07-12 ENCOUNTER — PATIENT OUTREACH (OUTPATIENT)
Dept: CARE COORDINATION | Facility: CLINIC | Age: 78
End: 2023-07-12
Payer: COMMERCIAL

## 2023-07-12 VITALS
SYSTOLIC BLOOD PRESSURE: 152 MMHG | RESPIRATION RATE: 22 BRPM | TEMPERATURE: 98 F | DIASTOLIC BLOOD PRESSURE: 97 MMHG | OXYGEN SATURATION: 96 % | BODY MASS INDEX: 28.55 KG/M2 | WEIGHT: 136 LBS | HEIGHT: 58 IN | HEART RATE: 94 BPM

## 2023-07-12 LAB
ANION GAP SERPL CALCULATED.3IONS-SCNC: 7 MMOL/L (ref 7–15)
BUN SERPL-MCNC: 12.3 MG/DL (ref 8–23)
CALCIUM SERPL-MCNC: 8.9 MG/DL (ref 8.8–10.2)
CHLORIDE SERPL-SCNC: 106 MMOL/L (ref 98–107)
CREAT SERPL-MCNC: 0.76 MG/DL (ref 0.51–0.95)
DEPRECATED HCO3 PLAS-SCNC: 26 MMOL/L (ref 22–29)
ERYTHROCYTE [DISTWIDTH] IN BLOOD BY AUTOMATED COUNT: 14.5 % (ref 10–15)
GFR SERPL CREATININE-BSD FRML MDRD: 80 ML/MIN/1.73M2
GLUCOSE SERPL-MCNC: 98 MG/DL (ref 70–99)
HCT VFR BLD AUTO: 36.6 % (ref 35–47)
HGB BLD-MCNC: 11.4 G/DL (ref 11.7–15.7)
MCH RBC QN AUTO: 26.8 PG (ref 26.5–33)
MCHC RBC AUTO-ENTMCNC: 31.1 G/DL (ref 31.5–36.5)
MCV RBC AUTO: 86 FL (ref 78–100)
PLATELET # BLD AUTO: 306 10E3/UL (ref 150–450)
POTASSIUM SERPL-SCNC: 4.2 MMOL/L (ref 3.4–5.3)
RBC # BLD AUTO: 4.25 10E6/UL (ref 3.8–5.2)
SODIUM SERPL-SCNC: 139 MMOL/L (ref 136–145)
WBC # BLD AUTO: 18.1 10E3/UL (ref 4–11)

## 2023-07-12 PROCEDURE — 99238 HOSP IP/OBS DSCHRG MGMT 30/<: CPT | Mod: GC

## 2023-07-12 PROCEDURE — 250N000012 HC RX MED GY IP 250 OP 636 PS 637

## 2023-07-12 PROCEDURE — 250N000013 HC RX MED GY IP 250 OP 250 PS 637

## 2023-07-12 PROCEDURE — 36415 COLL VENOUS BLD VENIPUNCTURE: CPT

## 2023-07-12 PROCEDURE — 94640 AIRWAY INHALATION TREATMENT: CPT

## 2023-07-12 PROCEDURE — 85014 HEMATOCRIT: CPT

## 2023-07-12 PROCEDURE — 94640 AIRWAY INHALATION TREATMENT: CPT | Mod: 76

## 2023-07-12 PROCEDURE — 250N000009 HC RX 250: Performed by: FAMILY MEDICINE

## 2023-07-12 PROCEDURE — 97535 SELF CARE MNGMENT TRAINING: CPT | Mod: GO

## 2023-07-12 PROCEDURE — 80048 BASIC METABOLIC PNL TOTAL CA: CPT

## 2023-07-12 PROCEDURE — 999N000156 HC STATISTIC RCP CONSULT EA 30 MIN

## 2023-07-12 PROCEDURE — 97116 GAIT TRAINING THERAPY: CPT | Mod: GP

## 2023-07-12 RX ORDER — PREDNISONE 20 MG/1
40 TABLET ORAL DAILY
Qty: 4 TABLET | Refills: 0 | Status: SHIPPED | OUTPATIENT
Start: 2023-07-13 | End: 2023-07-18

## 2023-07-12 RX ORDER — AZITHROMYCIN 250 MG/1
250 TABLET, FILM COATED ORAL DAILY
Qty: 2 TABLET | Refills: 0 | Status: SHIPPED | OUTPATIENT
Start: 2023-07-13 | End: 2023-07-18

## 2023-07-12 RX ADMIN — SENNOSIDES AND DOCUSATE SODIUM 1 TABLET: 50; 8.6 TABLET ORAL at 08:38

## 2023-07-12 RX ADMIN — IPRATROPIUM BROMIDE AND ALBUTEROL SULFATE 3 ML: 2.5; .5 SOLUTION RESPIRATORY (INHALATION) at 06:25

## 2023-07-12 RX ADMIN — AMLODIPINE BESYLATE 5 MG: 5 TABLET ORAL at 08:28

## 2023-07-12 RX ADMIN — UMECLIDINIUM 1 PUFF: 62.5 AEROSOL, POWDER ORAL at 08:31

## 2023-07-12 RX ADMIN — Medication 81 MG: at 08:27

## 2023-07-12 RX ADMIN — PREDNISONE 40 MG: 20 TABLET ORAL at 08:28

## 2023-07-12 RX ADMIN — IPRATROPIUM BROMIDE AND ALBUTEROL SULFATE 3 ML: 2.5; .5 SOLUTION RESPIRATORY (INHALATION) at 11:46

## 2023-07-12 RX ADMIN — AZITHROMYCIN 250 MG: 250 TABLET, FILM COATED ORAL at 08:27

## 2023-07-12 ASSESSMENT — ACTIVITIES OF DAILY LIVING (ADL)
ADLS_ACUITY_SCORE: 37
ADLS_ACUITY_SCORE: 37
DEPENDENT_IADLS:: INDEPENDENT
ADLS_ACUITY_SCORE: 37

## 2023-07-12 NOTE — DISCHARGE INSTRUCTIONS
Community-Acquired Pneumonia in Adults  What is community-acquired pneumonia?  Pneumonia is a type of lung infection. It can cause breathing problems and other symptoms. In community-acquired pneumonia (CAP), you get infected in a community setting. It doesn t happen in a hospital, nursing home, or other healthcare center.   Your lungs are part of your respiratory system. This system supplies fresh oxygen to your blood and removes carbon dioxide, a waste product. When you breathe in air through your nose and mouth, it reaches the tiny air sacs of the lung (alveoli) through a series of tubes. From here, oxygen flows into your blood. Carbon dioxide flows out from the blood into the alveoli. You then breathe it out.   Many germs can grow inside and on your body and cause disease. Certain germs can cause lung infection and pneumonia when they get into the lungs. This can cause your respiratory system to work poorly. For example, oxygen may not be able to get into your blood as easily. That can cause shortness of breath. Pneumonia may lead to death if your body can t get enough oxygen to survive.   Sometimes these germs can spread from person to person. When someone infected with one of these germs sneezes or coughs, you might breathe the germs into your lungs. The germs may grow and cause pneumonia if your immune system doesn t kill the germs first.   CAP can result from infection with many types of germs. These include bacteria, viruses, fungi, and parasites. Symptoms from pneumonia can range from mild to severe. Certain types of germs are more likely to lead to serious infection.   CAP is more common during the winter months. It's also more common in older adults. But it can affect people of any age. It can be very serious, especially in older adults, young children, and people with other health problems.   What causes community-acquired pneumonia?  Many different types of germs can cause pneumonia. But certain  types cause CAP more often. Worldwide, Streptococcus pneumoniae is a type of bacteria that is most often responsible for CAP in adults. Some other common bacteria that cause CAP are:   Haemophilus influenzae  Mycoplasma pneumoniae  Chlamydia pneumoniae  Legionella  Gram-negative bacilli  Staphylococcus aureus  The flu (influenza) virus is also a major viral cause of CAP. Having the flu also makes you more likely to get bacterial pneumonia. This type is often worse than viral pneumonia. Other types of viruses can also cause CAP. These include SARS-CoV-2 (the cause of COVID-19), parainfluenza virus, echovirus, adenovirus, and coxsackievirus. In fact, viruses are likely the cause of most episodes of CAP. Fungi and parasites may also cause CAP.   Who is at risk for community-acquired pneumonia?  Certain things may raise your risk for CAP. Some of these are:  Smoking  Weak immune system, such as from medicines or a health problem such as diabetes, cancer, or HIV  Other lung problems, such as COPD (chronic obstructive pulmonary disease)  Other health problems, such as kidney failure  Use of certain medicines, including proton-pump inhibitors  Heavy alcohol use  You also have a higher risk if you come into contact with other people who have pneumonia.  What are the symptoms of community-acquired pneumonia?  Symptoms of CAP often develop quickly. These symptoms may include:  Shortness of breath  Coughing  Heavy sputum  Fever and chills  Chest pain that is worse when you breathe or cough  Upper belly (abdomen) pain with nausea, vomiting, or diarrhea  Your healthcare provider might notice other signs. These are a fast heartbeat, fast breathing rate, or certain sounds on a lung exam.   How is community-acquired pneumonia diagnosed?  Your healthcare provider will ask about your more recent symptoms and your past health problems. They will also do a physical exam, including a careful exam of your lungs.   Lab tests can be very  helpful in diagnosing CAP. Some tests you might need are:  Chest X-ray, which often confirms the diagnosis  Blood tests to check for infection and oxygen status of your blood  Blood culture tests to see if a germ is growing in your bloodstream  Tests of your sputum to see if a germ is present there  Urine tests for certain bacteria  Swab of your nose or throat to test for viruses or bacteria  How is community-acquired pneumonia treated?  Your treatment may vary based on your symptoms and the type of germ causing the pneumonia. If you have severe pneumonia, you will likely need to stay in the hospital for some time. If you only have mild symptoms, you can probably get treatment at home.   Antibiotics are a key treatment for bacterial CAP. Certain viral forms of CAP may also be treated with antiviral medicine. But for many viral causes there is no specific medicine. If needed, your healthcare provider will likely start you on an antibiotic or antiviral medicine even before finding out the type of bacteria or virus. The type of antibiotic can vary based on the germs known to be in your community, as well as your other health problems. Your healthcare provider will want to treat you with an antibiotic that is likely to kill whatever germ is causing your illness. Antibacterial antibiotics don't help in treating viral pneumonia. They can often cause more harm than good.   For treatment at home, you will probably take an antibiotic by mouth for 5 to 7 days. In most cases, you will start to feel better a few days after you start treatment.   If you need to stay in the hospital, you will also need antibiotics specific to your case. In some cases, you may need to take these by IV (intravenously). Your healthcare provider might first start you on a certain antibiotic. They might then switch you to another as your blood tests show what kind of germ is causing your infection. You may also need extra support. This includes:    Extra oxygen  Fluids, if you are dehydrated  Breathing treatments  Respiratory support, such as with a ventilator, for a severe case  Most people start getting better with treatment within a few days. A small portion of people who are treated in the hospital don t respond to treatment within this time. If your symptoms don t end, you may need a different antibiotic or treatment for complications from CAP.   What are possible complications of community-acquired pneumonia?   Lung abscess and, rarely, empyema are possible complications of CAP. In empyema, a collection of pus builds in the space between the lung and the chest wall. You usually need antibiotics and drainage to treat it. A CT scan can often help diagnose these problems.   Respiratory failure and death are other possible complications. These are more likely to happen in older adults or those with other health problems.   What can I do to prevent community-acquired pneumonia?  You can lower your chances of getting CAP by having a yearly flu shot. The pneumococcal vaccines protect against S. pneumoniae and may help in preventing CAP. Healthcare providers advise this shot for all people older than 65. You may need it before this time if you have:   Chronic heart, lung, liver, or kidney disease  Diabetes  Alcoholism  HIV  Weak immune system  Smokers and people living in long-term care facilities should also get this shot before age 65. There are 2 vaccines against S. pneumoniae. Your healthcare provider may advise that you get both. You may need booster shots of the vaccine if you have your first pneumococcal vaccine before age 65 or if you have a weakened immune system.   Practicing good hygiene can also help you lower your risk for CAP. That includes frequent handwashing.   When should I call my healthcare provider?  Get treatment right away if you have symptoms of pneumonia. If you are being treated for CAP as an outpatient, call your healthcare provider  if your symptoms don t get better in a few days after starting treatment, or they get worse.   Key points about community-acquired pneumonia  Pneumonia is a type of lung infection. It can cause breathing problems and other symptoms. In CAP, infection occurs outside of a healthcare setting.  CAP is a leading cause of death in older adults. Most healthy young adults recover from CAP without a problem.  CAP can cause shortness of breath, fever, and cough.  You might need to stay in the hospital to be treated for CAP.  Most cases of CAP are caused by viruses and don't need treatment with antibiotics.  Antibiotics are the key treatment for most types of CAP caused by bacteria. Antiviral medicine may be prescribed for certain viral causes of pneumonia.  Getting your vaccines as advised can help lower your risk for CAP.    Next steps  Tips to help you get the most from a visit to your healthcare provider:  Know the reason for your visit and what you want to happen.  Before your visit, write down questions you want answered.  Bring someone with you to help you ask questions and remember what your provider tells you.  At the visit, write down the name of a new diagnosis, and any new medicines, treatments, or tests. Also write down any new instructions your provider gives you.  Know why a new medicine or treatment is prescribed, and how it will help you. Also know what the side effects are.  Ask if your condition can be treated in other ways.  Know why a test or procedure is recommended and what the results could mean.  Know what to expect if you do not take the medicine or have the test or procedure.  If you have a follow-up appointment, write down the date, time, and purpose for that visit.  Know how you can contact your provider if you have questions.  [employer] last reviewed this educational content on 11/1/2022 2000-2023 [employer]. All rights reserved. This information is not intended as a substitute  for professional medical care. Always follow your healthcare professional's instructions.
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
## 2023-07-12 NOTE — PLAN OF CARE
Occupational Therapy Discharge Summary    Reason for therapy discharge:    Discharged to home.    Progress towards therapy goal(s). See goals on Care Plan in [facility] electronic health record for goal details.  Goals met    Therapy recommendation(s):    No further therapy is recommended.

## 2023-07-12 NOTE — SIGNIFICANT EVENT
Significant Event Note    Time of event: 9:31 PM July 11, 2023    Description of event:  Paged by RN about lactic acid increasing from 2.5 to 3.1. Briefly, patient is admitted with COPD exacerbation +/- pneumonia. Is on room air. Very wheezy per RN and RT. Patient has gotten 1500 ml isotonic fluids over the afternoon/evening.     Plan:  Lactic acid elevation very difficult to interpret in the setting of albuterol usage (frequent duoneb treatments). Will stop checking lactates as she is vitally stable and still on room air. She is on ceftriaxone and azithromycin for CAP coverage.    Discussed with: bedside nurse    Astrid Wing MD

## 2023-07-12 NOTE — PLAN OF CARE
Goal Outcome Evaluation:      RCAT Treatment Plan    Patient Score: 7  Patient Acuity: 4    Clinical Indication for Therapy:COPD  Therapy Ordered: nebs    Assessment Summary: continue with current therapy    Igor Sahni, RT  7/12/2023

## 2023-07-12 NOTE — PLAN OF CARE
Goal Outcome Evaluation:    Problem: COPD (Chronic Obstructive Pulmonary Disease)  Goal: Effective Oxygenation and Ventilation  Outcome: Progressing     Problem: Plan of Care - These are the overarching goals to be used throughout the patient stay.    Goal: Optimal Comfort and Wellbeing  Outcome: Progressing  Pt A&Ox4, made needs known, able to sleep uninterrupted most of shift w/ even unlabored respirations. Denied the need for professional . Pt remained on RA NOC, VSS, expiratory wheezes somewhat improved and exertional dyspnea unchanged, pt able to catch breath on their own, denied need for neb. Got another order for inhaler, due to pt not liking how neb makes her shake at times. Pt safe and comfortable NOC, up SBA w/ cane to bathroom.

## 2023-07-12 NOTE — PROGRESS NOTES
Care Management Discharge Note    Discharge Date: 07/12/2023     Discharge Disposition:  Home    Discharge Services:  No skilled services    Discharge DME:  Per therapy    Discharge Transportation: family will provide    Private pay costs discussed: Not applicable    Does the patient's insurance plan have a 3 day qualifying hospital stay waiver?  No    PAS Confirmation Code:  NA  Patient/family educated on Medicare website which has current facility and service quality ratings: NA    Education Provided on the Discharge Plan:  Per team  Persons Notified of Discharge Plans: Nursing;   Patient/Family in Agreement with the Plan:  Yes    Handoff Referral Completed: No    Additional Information:  Discharging to home. Discharge orders noted and reviewed. No CM needs identified.     Penny Pratt RN

## 2023-07-15 LAB
BACTERIA BLD CULT: NO GROWTH
BACTERIA BLD CULT: NO GROWTH

## 2023-07-17 ENCOUNTER — MEDICAL CORRESPONDENCE (OUTPATIENT)
Dept: HEALTH INFORMATION MANAGEMENT | Facility: CLINIC | Age: 78
End: 2023-07-17

## 2023-07-18 ENCOUNTER — OFFICE VISIT (OUTPATIENT)
Dept: FAMILY MEDICINE | Facility: CLINIC | Age: 78
End: 2023-07-18
Payer: COMMERCIAL

## 2023-07-18 ENCOUNTER — PATIENT OUTREACH (OUTPATIENT)
Dept: CARE COORDINATION | Facility: CLINIC | Age: 78
End: 2023-07-18

## 2023-07-18 VITALS
SYSTOLIC BLOOD PRESSURE: 108 MMHG | HEIGHT: 58 IN | WEIGHT: 136 LBS | BODY MASS INDEX: 28.55 KG/M2 | RESPIRATION RATE: 18 BRPM | DIASTOLIC BLOOD PRESSURE: 74 MMHG | OXYGEN SATURATION: 98 % | HEART RATE: 74 BPM

## 2023-07-18 DIAGNOSIS — Z09 HOSPITAL DISCHARGE FOLLOW-UP: ICD-10-CM

## 2023-07-18 DIAGNOSIS — J44.1 COPD EXACERBATION (H): Primary | ICD-10-CM

## 2023-07-18 DIAGNOSIS — T74.91XD DOMESTIC VIOLENCE OF ADULT, SUBSEQUENT ENCOUNTER: ICD-10-CM

## 2023-07-18 DIAGNOSIS — Z91.89 AT INCREASED RISK FOR INTIMATE PARTNER VIOLENCE: ICD-10-CM

## 2023-07-18 LAB
ERYTHROCYTE [DISTWIDTH] IN BLOOD BY AUTOMATED COUNT: 14.6 % (ref 10–15)
HCT VFR BLD AUTO: 36.8 % (ref 35–47)
HGB BLD-MCNC: 11.5 G/DL (ref 11.7–15.7)
MCH RBC QN AUTO: 27.2 PG (ref 26.5–33)
MCHC RBC AUTO-ENTMCNC: 31.3 G/DL (ref 31.5–36.5)
MCV RBC AUTO: 87 FL (ref 78–100)
PLATELET # BLD AUTO: 202 10E3/UL (ref 150–450)
RBC # BLD AUTO: 4.23 10E6/UL (ref 3.8–5.2)
WBC # BLD AUTO: 9.8 10E3/UL (ref 4–11)

## 2023-07-18 PROCEDURE — 36415 COLL VENOUS BLD VENIPUNCTURE: CPT | Performed by: FAMILY MEDICINE

## 2023-07-18 PROCEDURE — 99495 TRANSJ CARE MGMT MOD F2F 14D: CPT | Performed by: FAMILY MEDICINE

## 2023-07-18 PROCEDURE — 85027 COMPLETE CBC AUTOMATED: CPT | Performed by: FAMILY MEDICINE

## 2023-07-18 RX ORDER — CARBOXYMETHYLCELLULOSE SODIUM 10 MG/ML
1 GEL OPHTHALMIC 2 TIMES DAILY PRN
COMMUNITY
Start: 2023-04-04 | End: 2023-09-14

## 2023-07-18 RX ORDER — PREDNISONE 10 MG/1
TABLET ORAL
COMMUNITY
End: 2023-07-18

## 2023-07-18 RX ORDER — DORZOLAMIDE HCL 20 MG/ML
SOLUTION/ DROPS OPHTHALMIC
COMMUNITY

## 2023-07-18 RX ORDER — AZITHROMYCIN 250 MG/1
TABLET, FILM COATED ORAL
COMMUNITY
End: 2023-07-18

## 2023-07-18 RX ORDER — PREDNISONE 20 MG/1
TABLET ORAL
COMMUNITY
End: 2023-07-18

## 2023-07-18 RX ORDER — INFLUENZA A VIRUS A/NEBRASKA/14/2019 (H1N1) ANTIGEN (MDCK CELL DERIVED, PROPIOLACTONE INACTIVATED), INFLUENZA A VIRUS A/DELAWARE/39/2019 (H3N2) ANTIGEN (MDCK CELL DERIVED, PROPIOLACTONE INACTIVATED), INFLUENZA B VIRUS B/SINGAPORE/INFTT-16-0610/2016 ANTIGEN (MDCK CELL DERIVED, PROPIOLACTONE INACTIVATED), INFLUENZA B VIRUS B/DARWIN/7/2019 ANTIGEN (MDCK CELL DERIVED, PROPIOLACTONE INACTIVATED) 15; 15; 15; 15 UG/.5ML; UG/.5ML; UG/.5ML; UG/.5ML
INJECTION, SUSPENSION INTRAMUSCULAR
COMMUNITY
Start: 2022-09-20 | End: 2023-07-19

## 2023-07-18 RX ORDER — TRAMADOL HYDROCHLORIDE 50 MG/1
TABLET ORAL
COMMUNITY
End: 2023-08-25

## 2023-07-18 RX ORDER — ERYTHROMYCIN 5 MG/G
OINTMENT OPHTHALMIC
COMMUNITY
End: 2023-07-19

## 2023-07-18 RX ORDER — BRIMONIDINE TARTRATE 2 MG/ML
SOLUTION/ DROPS OPHTHALMIC
COMMUNITY
Start: 2023-04-19 | End: 2023-07-19

## 2023-07-18 RX ORDER — NAPROXEN 250 MG/1
TABLET ORAL
COMMUNITY
End: 2023-10-05

## 2023-07-18 RX ORDER — IBUPROFEN 600 MG/1
TABLET, FILM COATED ORAL
COMMUNITY
End: 2023-09-14

## 2023-07-18 RX ORDER — TERCONAZOLE 8 MG/G
CREAM VAGINAL
COMMUNITY
Start: 2022-03-08 | End: 2023-07-19

## 2023-07-18 RX ORDER — DORZOLAMIDE HCL 20 MG/ML
SOLUTION/ DROPS OPHTHALMIC
COMMUNITY
Start: 2023-04-19 | End: 2023-07-19

## 2023-07-18 RX ORDER — BRIMONIDINE TARTRATE 2 MG/ML
SOLUTION/ DROPS OPHTHALMIC
COMMUNITY
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
## 2023-07-18 NOTE — PROGRESS NOTES
"{F2 to delete after use***    Single Maintenance and Reliever Therapy (SMART)     Doses LOW  (Adult: 200-400 mcg/day)  (Peds: 100-200 mcg/day) MEDIUM  (Adult: >400-800 mcg/day)  (Peds: >200-400 mcg/day) High  (Adult: >800 mcg/day)  (Peds: >400 mcg/day)   Symbicort  Budesonide-formoterol HFA 80-4.5  160-4.5 2 BID + 2 PRN  - -  2 BID + 1 PRN -  3 BID + 1 PRN     Adult (>/= 12 years maximum = 12 puffs/day  Pediatric (6-11 years) = 8 puffs/day     LOW  (Adult: 200-400 mcg/day)  (Peds: 100 mcg/day) MEDIUM  (Adult: 200-400 mcg/day)  (Peds: 100 mcg/day) High  (Adult: 400 mcg/day)  (Peds: 200 mcg/day)   Dulera  Mometasone-formoterol -5  200-5 1-2 BID + 2 PRN  - 1-2 BID + 2 PRN  - -  2 BID + 1 PRN     Adult (>/= 12 years maximum = 11 puffs/day  Pediatric (6-11 years) = 7 puffs/day   Tip for Entering Rx:   1) Use radio buttons for maintenance dose,   2) Add PRN details to : \"Add additional information to the patient sig\"   i.e. , in addition to 1-2 puffs as needed for shortness of breath, up to *** puffs per day  3) Quantity : Recommend 2 inhalers per fill       Key: MDI: Metered dose inhaler, DPI: Dry powder inhaler (breath activated), SMI: Soft mist inhaler              ICS Adults and Children >12             Doses LOW MEDIUM HIGH Notes   Beclomethasone HFA   (Qvar Redihaler) - *See notes 40  80 1-2 BID  1 BID  () -  2 BID  (161-320) -  3 BID  (>320) Breath activated inhalation aerosol   Budesonide  (Pulmicort Flexhaler) - DPI 90  180 1-2 BID  1 BID  (180-360) -  2 BID  (361-720) -  3 BID  (>720) Contains milk protein   Ciclesonide HFA   (Alvesco) - MDI 80  160 1 BID  -  () 2 BID  1 BID  (161-320) -  2 BID  (>320)     Fluticasone propionate HFA (Flovent HFA) - MDI 44  110  220 1-2 BID  1 BID  -  () -  2 BID  1 BID  (221-440) -  -  2 BID  (>440)    Fluticasone propionate  (Flovent Diskus) - DPI 50  100  250 1-2 BID  1 BID  -  (100-250) -  2 BID  1 BID  (251-500) -  -  2 BID  (>500) Contains lactose "   Fluticasone furoate  (Arnuity Ellipta) -   200 -  - 1 QD  -  (100) -  1 QD  (200) Once daily  Contains lactose   Mometasone   (Asmanex Twisthaler) -   220 1 BID  -  (110-220) 2 BID  1 BID  (221-440) -  2 BID  (>440) Contains milk protein   Mometasone HFA   (Asmanex HFA) -   200 1 BID  -  (100-200) 2 BID  1 BID  (201-400) -  2 BID  (>400)      ICS-LABA    LOW MEDIUM HIGH Notes   Budesonide-formeoterol HFA   (Symbicort) - MDI 80-4.5   2 BID   160-4.5   2 BID -    Fluticasone furoate-vilanterol  (Breo Ellipta) - DPI   - 100-25   1 -25  1 QD Not for <19 yo  Contains lactose   Fluticasone propionate-salmeterol HFA (Advair HFA) - MDI   45-21  2 -21  2 -21  2 BID    Fluticasone propionate-salmeterol   (Advair Diskus) - -50  1 -50  1 -50  1 BID   Contains lactose   Fluticasone propionate-salmeterol  (AirDuo Respiclick) - DPI 55-14  1 -14  1 -14  1 BID Contains lactose   Mometasone-formoterol HFA   (Dulera) - MDI   - 100-5  2 -5  2 BID      LABA  Arformoterol  (Brovana) - Nebulized solution   15 mcg, 1 vial BID   Formoterol  (Perforomist) - Nebulized solution   20 mcg, 1 vial BID   Indacaterol  (Arcapta Neohaler) - DPI   75 mcg, 1 capsule QD  (Contains lactose, milk protein)   Olodaterol  (Striverdi Respimat) - SMI   2.5 mcg, 2 inhalations QD   Salmeterol   (Serevent Diskus) - DPI   50 mcg, 1 inhalation BID  (Contains lactose)     LAMA   Dosing   Tiotropium   (Spiriva) Handihaler (DPI): 1 capsule (18 mcg) QD  (Contains milk protein)  Respimat (SMI): 2 inhalations QD (2.5 mcg per actuation)     Umeclidinium   (Incruse Ellipta) - DPI   1 inhalation (62.5 mcg) QD  (Contains lactose)     Aclidinium   (Tudorza Pressair) - DPI   1 inhalation BID (400 mcg per inhalation)  (Contains milk protein)     Glycopyrrolate      Seebri Neohaler (DPI): 1 capsule (15.6 mcg) BID  (Contains lactose, milk protein)  Lonhala Magnair (Nebulized solution): 25 mcg, 1  vial BID       LABA-LAMA   Dosing   Indacaterol/Glycopyrrolate   (Utibron Neohaler) - DPI   1 capsule (27.5 mcg-15.6 mcg) BID  (Contains lactose, milk protein)   Formoterol/Glycopyrrolate  (Bevespi Aerosphere) - MDI   2 inhalations BID (4.8 mcg-9 mcg per inhalation)   Olodaterol/Tiotropium   (Stiolto Respimat) - SMI   2 inhalation QD (2.5 mcg-2.5 mcg per inhalation)   Vilanterol/Umeclidinium   (Anoro Ellipta) - DPI   1 inhalation (25 mcg-62.5 mcg) QD  (Contains milk protein)     SYD-SODX-EVDV   Dosing   Fluticasone furoate / Vilanterol / Umeclidinium  (Trelegy Ellipta) - DPI 1 inhalation QD   (100 mcg-25 mcg-62.5 mcg per inhalation)  (Contains lactose)     Budesonide / Formoterol / Glycopyrrolate  (Breztri) - MDI   2 inhalations BID  (160 mcg-4.8 mcg-9 mcg per inhalation)           Updated 11/2022}

## 2023-07-19 ENCOUNTER — OFFICE VISIT (OUTPATIENT)
Dept: PULMONOLOGY | Facility: CLINIC | Age: 78
End: 2023-07-19
Payer: COMMERCIAL

## 2023-07-19 ENCOUNTER — MEDICAL CORRESPONDENCE (OUTPATIENT)
Dept: HEALTH INFORMATION MANAGEMENT | Facility: CLINIC | Age: 78
End: 2023-07-19

## 2023-07-19 VITALS
BODY MASS INDEX: 28.37 KG/M2 | OXYGEN SATURATION: 97 % | DIASTOLIC BLOOD PRESSURE: 74 MMHG | HEART RATE: 80 BPM | WEIGHT: 137 LBS | SYSTOLIC BLOOD PRESSURE: 126 MMHG

## 2023-07-19 DIAGNOSIS — J44.1 COPD EXACERBATION (H): ICD-10-CM

## 2023-07-19 DIAGNOSIS — J44.9 COPD, SEVERE (H): Primary | ICD-10-CM

## 2023-07-19 DIAGNOSIS — R06.2 WHEEZING: ICD-10-CM

## 2023-07-19 PROCEDURE — 99214 OFFICE O/P EST MOD 30 MIN: CPT | Performed by: NURSE PRACTITIONER

## 2023-07-19 RX ORDER — IPRATROPIUM BROMIDE AND ALBUTEROL SULFATE 2.5; .5 MG/3ML; MG/3ML
1 SOLUTION RESPIRATORY (INHALATION) EVERY 6 HOURS PRN
Qty: 360 ML | Refills: 11 | Status: SHIPPED | OUTPATIENT
Start: 2023-07-19 | End: 2023-08-25

## 2023-07-19 NOTE — PATIENT INSTRUCTIONS
It was a pleasure to see you in clinic today.   Here is what we discussed:    Start Trelegy Ellipta inhaler, one inhalation once daily.  Rinse/gargle after use.  Please let us know if there is an issue with cost/coverage at the pharmacy and we can find a cheaper alternative.  Start Duonebs via the nebulizer, every 6 hours as needed for shortness of breath or wheezing.  Please take this 4 times a day until you get your new inhaler.  Call us with any change or worsening of your breathing.  Follow-up in 3 months.    Arlyn Nguyen, CNP  Pulmonary Medicine  [facility]  [phone]

## 2023-08-01 ENCOUNTER — OFFICE VISIT (OUTPATIENT)
Dept: FAMILY MEDICINE | Facility: CLINIC | Age: 78
End: 2023-08-01
Payer: COMMERCIAL

## 2023-08-01 VITALS
WEIGHT: 136.69 LBS | RESPIRATION RATE: 20 BRPM | HEIGHT: 58 IN | BODY MASS INDEX: 28.69 KG/M2 | DIASTOLIC BLOOD PRESSURE: 70 MMHG | TEMPERATURE: 97.6 F | SYSTOLIC BLOOD PRESSURE: 106 MMHG | OXYGEN SATURATION: 98 % | HEART RATE: 81 BPM

## 2023-08-01 DIAGNOSIS — N18.2 CKD (CHRONIC KIDNEY DISEASE) STAGE 2, GFR 60-89 ML/MIN: Primary | ICD-10-CM

## 2023-08-01 DIAGNOSIS — H40.003 GLAUCOMA SUSPECT, BILATERAL: ICD-10-CM

## 2023-08-01 DIAGNOSIS — I10 ESSENTIAL HYPERTENSION, BENIGN: ICD-10-CM

## 2023-08-01 DIAGNOSIS — E78.5 HYPERLIPIDEMIA, UNSPECIFIED HYPERLIPIDEMIA TYPE: ICD-10-CM

## 2023-08-01 DIAGNOSIS — J44.9 CHRONIC OBSTRUCTIVE PULMONARY DISEASE, UNSPECIFIED COPD TYPE (H): ICD-10-CM

## 2023-08-01 LAB
CREAT UR-MCNC: 92.8 MG/DL
MICROALBUMIN UR-MCNC: <12 MG/L
MICROALBUMIN/CREAT UR: NORMAL MG/G{CREAT}

## 2023-08-01 PROCEDURE — 82570 ASSAY OF URINE CREATININE: CPT

## 2023-08-01 PROCEDURE — 99214 OFFICE O/P EST MOD 30 MIN: CPT | Mod: GC

## 2023-08-01 PROCEDURE — 82043 UR ALBUMIN QUANTITATIVE: CPT

## 2023-08-01 RX ORDER — AMLODIPINE BESYLATE 5 MG/1
5 TABLET ORAL DAILY
Qty: 90 TABLET | Refills: 3 | Status: SHIPPED | OUTPATIENT
Start: 2023-08-01 | End: 2023-08-25

## 2023-08-01 RX ORDER — LATANOPROST 50 UG/ML
1 SOLUTION/ DROPS OPHTHALMIC DAILY
Qty: 7.5 ML | Refills: 3 | Status: SHIPPED | OUTPATIENT
Start: 2023-08-01 | End: 2023-10-05

## 2023-08-01 RX ORDER — ATORVASTATIN CALCIUM 40 MG/1
40 TABLET, FILM COATED ORAL DAILY
Qty: 90 TABLET | Refills: 3 | Status: CANCELLED | OUTPATIENT
Start: 2023-08-01

## 2023-08-01 RX ORDER — ATORVASTATIN CALCIUM 40 MG/1
40 TABLET, FILM COATED ORAL DAILY
Qty: 90 TABLET | Refills: 3 | Status: SHIPPED | OUTPATIENT
Start: 2023-08-01 | End: 2023-08-25

## 2023-08-01 NOTE — LETTER
My COPD Action Plan     Name: Rhett Alvarado    YOB: 1945   Date: 8/1/2023    My doctor: Tito Bates MD   My clinic: [facility] [address] [place] [address]  [phone]  My Controller Medicine:  Trellegy Ellipta    Dose: 200/62.5/25     My Rescue Medicine: Duoneb   Dose: 0.5 mg     My Flare Up Medicine: Prednisone and Azithromycin (Zithromax or Zithromax Z-marylou)   Dose: 40 mg daily     My COPD Severity: Moderate = FeV1 < 79% -50%      Use of Oxygen: Oxygen Not Prescribed      Make sure you've had your pneumonia   vaccines.          GREEN ZONE       Doing well today    Usual level of activity and exercise  Usual amount of cough and mucus  No shortness of breath  Usual level of health (thinking clearly, sleeping well, feel like eating) Actions:    Take daily medicines  Use oxygen as prescribed  Follow regular exercise and diet plan  Avoid cigarette smoke and other irritants that harm the lungs           YELLOW ZONE          Having a bad day or flare up    Short of breath more than usual  A lot more sputum (mucus) than usual  Sputum looks yellow, green, tan, brown or bloody  More coughing or wheezing  Fever or chills  Less energy; trouble completing activities  Trouble thinking or focusing  Using quick relief inhaler or nebulizer more often  Poor sleep; symptoms wake me up  Do not feel like eating Actions:    Get plenty of rest  Take daily medicines  Use quick relief inhaler every 6 hours  If you use oxygen, call you doctor to see if you should adjust your oxygen  Do breathing exercises or other things to help you relax  Let a loved one, friend or neighbor know you are feeling worse  Call your care team if you have 2 or more symptoms.  Start taking steroids or antibiotics if directed by your care team           RED ZONE       Need medical care now    Severe shortness of breath (feel you can't breathe)  Fever, chills  Not enough breath to do any  activity  Trouble coughing up mucus, walking or talking  Blood in mucus  Frequent coughing Rescue medicines are not working  Not able to sleep because of breathing  Feel confused or drowsy  Chest pain    Actions:    Call your health care team.  If you cannot reach your care team, call 911 or go to the emergency room.        Annual Reminders:  Meet with Care Team, Flu Shot every Fall  Pharmacy: [facility] - [place], [place] - [address]

## 2023-08-01 NOTE — PROGRESS NOTES
"  Assessment & Plan     Chronic obstructive pulmonary disease, unspecified COPD type (H)   Gold category E COPD, last seen by Pulm 7/19 at which time switched Stiolto inhaler to Trelegy Ellipta. Recently completed course of prednisone/azithromycin for COPD exacerbation requriing hospitalization at the start of the month. Not on home O2. Lifelong non smoker. Pt reports preference for her previous inhaler as she gets frustrated by having to rinse her mouth after Trelegy use. Discussed the importance of this medicine in decreasing COPD related hospitalizations, as well as why we recommend rinsing the mouth out after use. After discussion pt is comfortable w/ this plan and will continue w/ Trelegy inhaler. Given her CAT score today is still 23 and still having fairly prominent symptoms I have recommended she follow up with Pulmonology regarding next streps. Today in clinic pt satting 98% on room air, though audible wheezing heard on auscultation throughout the lung fields.     CKD (chronic kidney disease) stage 2, GFR 60-89 ml/min  Due for urine protein check today.   - Albumin Random Urine Quantitative with Creat Ratio    Hyperlipidemia, unspecified hyperlipidemia type  Currently on Lipitor 40 mg daily, last lipid panel 6 months ago showing LDL of 91.  - atorvastatin (LIPITOR) 40 MG tablet  Dispense: 90 tablet; Refill: 3    Essential hypertension, benign  - amLODIPine (NORVASC) 5 MG tablet  Dispense: 90 tablet; Refill: 3    Glaucoma suspect, bilateral  - latanoprost (XALATAN) 0.005 % ophthalmic solution  Dispense: 7.5 mL; Refill: 3     MED REC REQUIRED{  Post Medication Reconciliation Status:    complete    BMI:   Estimated body mass index is 28.57 kg/m  as calculated from the following:    Height as of this encounter: 1.473 m (4' 10\").    Weight as of this encounter: 62 kg (136 lb 11 oz).     No follow-ups on file.    Tito Bates MD  [facility] Delaney is a 78 year old, " presenting for the following health issues:  RECHECK    HPI     COPD, gold E  - Seen by Dr. Rosenstein 7/18 for hospital follow up, after being admitted 7/10-12 w/ COPD exacerbation and pna  - Seen by Arlyn Hand of Pulm 7/19, at that time FEV1 55% expected  - Was recommended to increase Trelegy Ellipta dosing to 200/62.5/25 once daily, cont/ PRN nebs, 7d course prednisone, 5d course of azithromycin  - Stiolto switched to trelegy ellipta by pulm   - Today: Feels like trellegy not as effective, duonebs 4x daily more helpful   - Doesn't like rinsing mouth after using trellegy due to need for mouth rinse after   - CAT today 23    Per pulm note 7/19:  Lifelong non-smoker.  Moderately severe obstructive lung disease (FEV1 55% predicted), with air-trapping and normal diffusion capacity.  She does not meet criteria for improvement in spirometry with bronchodilator per ATS guidelines, but there is noted improvement during mid-flow rates.    She has had an exacerbation and hospitalization recently, and still having GUAJARDO and wheezing.  Has been treated with multiple prednisone and antibiotic courses.  She is currently on Spiriva, just increased regiment to Stiolto by primary physician yesterday.  She was told they do not have this inhaler at the pharmacy, so she has not started this yet.  LS with diffuse expiratory wheezes, saturations 97% on RA today.  CAT score 22 today, increased from 3 previously.  Plan:  - increase regiment to Trelegy Ellipta (fluticasone/umeclidinium/vilanterol) 200/62.5/25, one inhalation daily, rinse/gargle after use.  I let them know to alert us if there are coverage issues once at the pharmacy.  Appears cost has been issue in past for inhalers.  We could trial scheduled nebs (budesonide, arformoterol, etc) if needed.  - continue Duonebs PRN.  Encouraged her to use these 3-4 times/day while waiting to  inhalers.  - she is UTD with COVID vaccines and booster, annual influenza vaccine, and  "pneumococcal vaccine.  - Action plan: prednisone 40mg x7 days + azithromycin x5 days.     Review of Systems   Constitutional, HEENT, cardiovascular, pulmonary, gi and gu systems are negative, except as otherwise noted.      Objective    /70   Pulse 81   Temp 97.6  F (36.4  C)   Resp 20   Ht 1.473 m (4' 10\")   Wt 62 kg (136 lb 11 oz)   SpO2 98%   BMI 28.57 kg/m    Body mass index is 28.57 kg/m .    Physical Exam   GENERAL: healthy, alert and no distress  NECK: no adenopathy, no asymmetry, masses, or scars and thyroid normal to palpation  RESP: Moderately increased WOB at rest, audible wheezing from across the exam room, on auscultation there is wheezing throughout, no rales.   CV: regular rate and rhythm, normal S1 S2, no S3 or S4, no murmur, click or rub, no peripheral edema and peripheral pulses strong  ABDOMEN: soft, nontender, no hepatosplenomegaly, no masses and bowel sounds normal  MS: no gross musculoskeletal defects noted, no edema    No results found for this or any previous visit (from the past 24 hour(s)).    ----- Service Performed and Documented by Resident or Fellow ------              "

## 2023-08-06 ENCOUNTER — HEALTH MAINTENANCE LETTER (OUTPATIENT)
Age: 78
End: 2023-08-06

## 2023-08-08 ENCOUNTER — TELEPHONE (OUTPATIENT)
Dept: PULMONOLOGY | Facility: CLINIC | Age: 78
End: 2023-08-08
Payer: COMMERCIAL

## 2023-08-08 NOTE — TELEPHONE ENCOUNTER
Health Call Center    Phone Message    May a detailed message be left on voicemail: yes     Reason for Call: Symptoms or Concerns     If patient has red-flag symptoms, warm transfer to triage line    Current symptom or concern: increased nose bleeds    Symptoms have been present for:  1 month    Has patient previously been seen for this? No        Are there any new or worsening symptoms? Yes: Patients daughter calling requesting a return call to discuss patients medication due to increased nose bleeds. Patients daughter states they are able to get it stopped when it happens but they are concerned. Please call thank you.     Action Taken: Message routed to:  [facility] ([facility] pulm    Travel Screening: Not Applicable

## 2023-08-08 NOTE — PROGRESS NOTES
Preceptor Attestation:   Patient seen, evaluated and discussed with the resident. I have verified the content of the note, which accurately reflects my assessment of the patient and the plan of care.    Supervising Physician:Arie Velarde MD    [facility]

## 2023-08-08 NOTE — TELEPHONE ENCOUNTER
"Unable to reach patient back at phone number on the chart.  Left detailed VM message that patient should contact/follow up with her PCP for evaluation of these \"nose bleeds\".  She is not on any pulmonary medications that would be causing this.         "

## 2023-08-25 ENCOUNTER — OFFICE VISIT (OUTPATIENT)
Dept: FAMILY MEDICINE | Facility: CLINIC | Age: 78
End: 2023-08-25
Payer: COMMERCIAL

## 2023-08-25 VITALS
OXYGEN SATURATION: 96 % | BODY MASS INDEX: 29.23 KG/M2 | DIASTOLIC BLOOD PRESSURE: 91 MMHG | WEIGHT: 139.25 LBS | HEART RATE: 73 BPM | HEIGHT: 58 IN | TEMPERATURE: 97.5 F | SYSTOLIC BLOOD PRESSURE: 170 MMHG | RESPIRATION RATE: 18 BRPM

## 2023-08-25 DIAGNOSIS — E78.5 HYPERLIPIDEMIA, UNSPECIFIED HYPERLIPIDEMIA TYPE: ICD-10-CM

## 2023-08-25 DIAGNOSIS — J44.9 COPD, SEVERE (H): ICD-10-CM

## 2023-08-25 DIAGNOSIS — Z00.00 ENCOUNTER FOR MEDICARE ANNUAL WELLNESS EXAM: Primary | ICD-10-CM

## 2023-08-25 DIAGNOSIS — Z00.00 WELLNESS EXAMINATION: Primary | ICD-10-CM

## 2023-08-25 DIAGNOSIS — I10 ESSENTIAL HYPERTENSION, BENIGN: ICD-10-CM

## 2023-08-25 LAB
CHOLEST SERPL-MCNC: 160 MG/DL
HDLC SERPL-MCNC: 54 MG/DL
LDLC SERPL CALC-MCNC: 90 MG/DL
NONHDLC SERPL-MCNC: 106 MG/DL
TRIGL SERPL-MCNC: 82 MG/DL

## 2023-08-25 PROCEDURE — 80061 LIPID PANEL: CPT

## 2023-08-25 PROCEDURE — G0439 PPPS, SUBSEQ VISIT: HCPCS | Mod: GC

## 2023-08-25 PROCEDURE — 36415 COLL VENOUS BLD VENIPUNCTURE: CPT

## 2023-08-25 PROCEDURE — 99207 PR NO BILLABLE SERVICE THIS VISIT: CPT

## 2023-08-25 RX ORDER — PREDNISONE 20 MG/1
TABLET ORAL
Qty: 20 TABLET | Refills: 0 | Status: SHIPPED | OUTPATIENT
Start: 2023-08-25 | End: 2023-09-14

## 2023-08-25 RX ORDER — IPRATROPIUM BROMIDE AND ALBUTEROL SULFATE 2.5; .5 MG/3ML; MG/3ML
1 SOLUTION RESPIRATORY (INHALATION) EVERY 6 HOURS PRN
Qty: 360 ML | Refills: 11 | Status: SHIPPED | OUTPATIENT
Start: 2023-08-25 | End: 2023-08-25

## 2023-08-25 RX ORDER — AMLODIPINE BESYLATE 5 MG/1
5 TABLET ORAL DAILY
Qty: 90 TABLET | Refills: 3 | Status: SHIPPED | OUTPATIENT
Start: 2023-08-25 | End: 2023-10-02

## 2023-08-25 RX ORDER — ATORVASTATIN CALCIUM 40 MG/1
40 TABLET, FILM COATED ORAL DAILY
Qty: 90 TABLET | Refills: 3 | Status: SHIPPED | OUTPATIENT
Start: 2023-08-25 | End: 2023-10-02

## 2023-08-25 RX ORDER — IPRATROPIUM BROMIDE AND ALBUTEROL SULFATE 2.5; .5 MG/3ML; MG/3ML
1 SOLUTION RESPIRATORY (INHALATION) EVERY 6 HOURS PRN
Qty: 360 ML | Refills: 11 | Status: SHIPPED | OUTPATIENT
Start: 2023-08-25

## 2023-08-25 ASSESSMENT — ENCOUNTER SYMPTOMS
CONSTIPATION: 0
CHILLS: 0
CONSTIPATION: 0
HEMATOCHEZIA: 0
JOINT SWELLING: 0
PARESTHESIAS: 0
DIARRHEA: 0
ARTHRALGIAS: 0
HEARTBURN: 0
HEMATOCHEZIA: 0
HEADACHES: 0
FREQUENCY: 0
SORE THROAT: 0
ABDOMINAL PAIN: 0
DYSURIA: 0
NAUSEA: 0
FREQUENCY: 0
WEAKNESS: 1
PALPITATIONS: 0
FEVER: 0
SORE THROAT: 0
HEMATURIA: 0
HEADACHES: 0
MYALGIAS: 0
BREAST MASS: 0
MYALGIAS: 0
FEVER: 0
NAUSEA: 0
HEARTBURN: 0
COUGH: 0
DIARRHEA: 0
ABDOMINAL PAIN: 0
BREAST MASS: 0
JOINT SWELLING: 0
SHORTNESS OF BREATH: 1
SHORTNESS OF BREATH: 1
DIZZINESS: 0
WEAKNESS: 1
DYSURIA: 0
NERVOUS/ANXIOUS: 0
NERVOUS/ANXIOUS: 0
PARESTHESIAS: 0
DIZZINESS: 0
HEMATURIA: 0
EYE PAIN: 0
PALPITATIONS: 0
ARTHRALGIAS: 0
CHILLS: 0
COUGH: 0
EYE PAIN: 0

## 2023-08-25 ASSESSMENT — ACTIVITIES OF DAILY LIVING (ADL)
CURRENT_FUNCTION: BATHING REQUIRES ASSISTANCE
CURRENT_FUNCTION: SHOPPING REQUIRES ASSISTANCE
CURRENT_FUNCTION: PREPARING MEALS REQUIRES ASSISTANCE
CURRENT_FUNCTION: BATHING REQUIRES ASSISTANCE
CURRENT_FUNCTION: PREPARING MEALS REQUIRES ASSISTANCE
CURRENT_FUNCTION: TRANSPORTATION REQUIRES ASSISTANCE
CURRENT_FUNCTION: SHOPPING REQUIRES ASSISTANCE
CURRENT_FUNCTION: TRANSPORTATION REQUIRES ASSISTANCE
CURRENT_FUNCTION: HOUSEWORK REQUIRES ASSISTANCE
CURRENT_FUNCTION: HOUSEWORK REQUIRES ASSISTANCE

## 2023-08-25 NOTE — PROGRESS NOTES
"SUBJECTIVE:   Rhett is a 78 year old who presents for Preventive Visit.      8/25/2023     3:48 PM   Additional Questions   Roomed by hser say   Accompanied by self     Are you in the first 12 months of your Medicare coverage?  No    HPI    Have you ever done Advance Care Planning? (For example, a Health Directive, POLST, or a discussion with a medical provider or your loved ones about your wishes): Yes, will scan into chart today.     Fall risk  Fallen 2 or more times in the past year?: No  Any fall with injury in the past year?: No  Timed Up and Go Test (>13.5 is fall risk; contact physician) : 10 (Even and steady gait without cane today Denise RN)    Cognitive Screening   1) Repeat 3 items 2 of 3 words remembered  2) Clock draw: ABNORMAL Draws clock correctly, unable to accurately place hour and minute hands  3) 3 item recall: Recalls 3 objects  Results: Results appear culturally appropriate, no concern.    Mini-CogTM Copyright EAMON Champagne. Licensed by the author for use in [facility]; reprinted with permission (xiang@[facility]). All rights reserved.      Do you have sleep apnea, excessive snoring or daytime drowsiness? : no    Reviewed and updated as needed this visit by clinical staff   Tobacco  Allergies  Meds              Reviewed and updated as needed this visit by Provider                 Social History     Tobacco Use    Smoking status: Never     Passive exposure: Yes    Smokeless tobacco: Never   Substance Use Topics    Alcohol use: No         8/25/2023     3:22 PM   Alcohol Use   Prescreen: >3 drinks/day or >7 drinks/week? No    No          No data to display              Do you have a current opioid prescription? No  Do you use any other controlled substances or medications that are not prescribed by a provider? None    COPD  \"Gold category E COPD, last seen by Pulm 7/19 at which time switched Stiolto inhaler to Trelegy Ellipta. Recently completed course of prednisone/azithromycin for COPD " "exacerbation requriing hospitalization at the start of the month. Not on home O2. Lifelong non smoker. Pt reports preference for her previous inhaler as she gets frustrated by having to rinse her mouth after Trelegy use. Discussed the importance of this medicine in decreasing COPD related hospitalizations, as well as why we recommend rinsing the mouth out after use. After discussion pt is comfortable w/ this plan and will continue w/ Trelegy inhaler.\"  - Currently using Trellergy inhaler daily w/ good technique, Federico 3x daily, not using albuterol inhaler.   - Still having prominent wheezing, dyspnea on exertion.  - Able to participate in exercise weekly, though notices symptoms with this  - Scheduled to see pulmonology 10/20    Current providers sharing in care for this patient include: Patient Care Team:  Tito Bates MD as PCP - General (Student in organized health care education/training program)  Roxi Nayak RT as Chronic Pulmonary Disease Specialist (Respiratory Therapy)  Matthew Diop MD as Referring Physician  Arlyn Joiner APRN CNP as Nurse Practitioner (Dermatology)  Mickie Jean MD as MD (Endocrinology, Diabetes, and Metabolism)  Arlyn Joiner APRN CNP as Assigned Surgical Provider  Vida Marquez MD as Assigned Pulmonology Provider  Tito Bates MD as Assigned PCP    The following health maintenance items are reviewed in Epic and correct as of today:  Health Maintenance   Topic Date Due    ADVANCE CARE PLANNING  Never done    COPD ACTION PLAN  Never done    MEDICARE ANNUAL WELLNESS VISIT  Never done    LIPID  08/08/2023    ZOSTER IMMUNIZATION (2 of 2) 10/11/2022    INFLUENZA VACCINE (1) 09/01/2023    BMP  07/12/2024    MICROALBUMIN  08/01/2024    FALL RISK ASSESSMENT  08/25/2024    DTAP/TDAP/TD IMMUNIZATION (3 - Td or Tdap) 05/04/2032    DEXA  09/19/2037    SPIROMETRY  Completed    HEPATITIS C SCREENING  Completed    PHQ-2 (once per " "calendar year)  Completed    Pneumococcal Vaccine: 65+ Years  Completed    URINALYSIS  Completed    COVID-19 Vaccine  Completed    IPV IMMUNIZATION  Aged Out    MENINGITIS IMMUNIZATION  Aged Out    MAMMO SCREENING  Discontinued    COLORECTAL CANCER SCREENING  Discontinued     Mammogram Screening - Patient over age 75, has elected to discontinue screenings.  Pertinent mammograms are reviewed under the imaging tab.    Review of Systems  Constitutional, HEENT, cardiovascular, pulmonary, gi and gu systems are negative, except as otherwise noted.    OBJECTIVE:   BP (!) 145/91   Pulse 73   Temp 97.5  F (36.4  C) (Tympanic)   Resp 18   Ht 1.473 m (4' 10\")   Wt 63.2 kg (139 lb 4 oz)   SpO2 96%   BMI 29.10 kg/m   Estimated body mass index is 29.1 kg/m  as calculated from the following:    Height as of this encounter: 1.473 m (4' 10\").    Weight as of this encounter: 63.2 kg (139 lb 4 oz).  Physical Exam  GENERAL: healthy, alert and no distress  EYES: Eyes grossly normal to inspection, PERRL and conjunctivae and sclerae normal  HENT: ear canals and TM's normal, nose and mouth without ulcers or lesions  NECK: no adenopathy, no asymmetry, masses, or scars and thyroid normal to palpation  RESP: lungs clear to auscultation - no rales, rhonchi or wheezes  BREAST: normal without masses, tenderness or nipple discharge and no palpable axillary masses or adenopathy  CV: regular rate and rhythm, normal S1 S2, no S3 or S4, no murmur, click or rub, no peripheral edema and peripheral pulses strong  ABDOMEN: soft, nontender, no hepatosplenomegaly, no masses and bowel sounds normal  MS: no gross musculoskeletal defects noted, no edema  SKIN: no suspicious lesions or rashes  NEURO: Normal strength and tone, mentation intact and speech normal  PSYCH: mentation appears normal, affect normal/bright    Diagnostic Test Results:  Labs reviewed in Epic  No results found for this or any previous visit (from the past 24 hour(s)).    ASSESSMENT " / PLAN:     (Z00.00) Encounter for Medicare annual wellness exam  Comment: 78 year old woman w/ pmh poorly controlled COPD w/ multiple recent exacerbations and courses of steroids, htn, hyperlipidemia. Significant life stress currently due to  w/ severe dementia w/ behavioral disturbances. Currently living w/ her daughter in [facility], feels much safer there. No longer screening mammograms, not due for CRC screening today. BP acutely elevated today in clinic, likely 2/2 high frequency of Duonebs use for breathing, see below. No concerns on Mini-cog, timed up and down <13 seconds.     (E78.5) Hyperlipidemia, unspecified hyperlipidemia type  (primary encounter diagnosis)  Plan: Lipid panel reflex to direct LDL Non-fasting,         atorvastatin (LIPITOR) 40 MG tablet    (J44.9) COPD, severe (H)  Comment: Still having significant symptoms, audible wheezing from across the room, dyspnea on exertion despite daily Trellegy use and Duonebs 3x daily. Has had several steroid bursts recently for exacerbations. Scheduled to see Pulmonology 10/20, will reach out and see if we can get her appointment moved up. Plan to follow up in clinic in 2 weeks to check in.  Plan: predniSONE (DELTASONE) 20 MG tablet,         ipratropium - albuterol 0.5 mg/2.5 mg/3 mL         (DUONEB) 0.5-2.5 (3) MG/3ML neb solution,         DISCONTINUED: ipratropium - albuterol 0.5         mg/2.5 mg/3 mL (DUONEB) 0.5-2.5 (3) MG/3ML neb         solution    (I10) Essential hypertension, benign  Comment: BPs elevated in clinic today, very likely due to life stress, current heat, and recent frequent use of duonebs. Plan to recheck in coming weeks.  Plan: amLODIPine (NORVASC) 5 MG tablet    Patient has been advised of split billing requirements and indicates understanding: Yes    COUNSELING:  Reviewed preventive health counseling, as reflected in patient instructions       Regular exercise       Healthy diet/nutrition       Fall risk prevention        "Osteoporosis prevention/bone health      BMI:   Estimated body mass index is 29.1 kg/m  as calculated from the following:    Height as of this encounter: 1.473 m (4' 10\").    Weight as of this encounter: 63.2 kg (139 lb 4 oz).     She reports that she has never smoked. She has been exposed to tobacco smoke. She has never used smokeless tobacco.    Appropriate preventive services were discussed with this patient, including applicable screening as appropriate for cardiovascular disease, diabetes, osteopenia/osteoporosis, and glaucoma.  As appropriate for age/gender, discussed screening for colorectal cancer, prostate cancer, breast cancer, and cervical cancer. Checklist reviewing preventive services available has been given to the patient.    Reviewed patients plan of care and provided an AVS. The Basic Care Plan (routine screening as documented in Health Maintenance) for Rhett meets the Care Plan requirement. This Care Plan has been established and reviewed with the daughter.    Tito Bates MD  [facility]    Identified Health Risks:  I have reviewed Opioid Use Disorder and Substance Use Disorder risk factors and made any needed referrals.   "

## 2023-08-25 NOTE — PATIENT INSTRUCTIONS
PERSONAL PREVENTIVE SERVICES PLAN - SERVICES     Review these tests with your medical staff then decide which ones you want and take this page home for your reference      SCREENING TESTS     Description   Year of Last Screening   Recommended Today?   Heart disease screening blood tests  Cholesterol level Reducing cholesterol can reduce your risk of heart attacks by 25%.  Screening is recommended yearly if you are at risk of heart disease otherwise every 4-5 years 8/2022 Yes; Recommended    Diabetes screening tests  Hemoglobin A1c blood test   Finding and treating diabetes early can reduce complications.  Screening recommended/covered yearly if you have high blood pressure, high cholesterol, obesity (BMI >30), or a history of high blood glucose tests; or 2 of the following: family history of diabetes, overweight (BMI >25 but <30), age 65 years or older, and a history of diabetes of pregnancy or gave birth to baby weighing more than 9 lbs. 2/2022 Yes, recommended   Hepatitis B screening Finding hepatitis B early can reduce complications.  Screening is recommended for persons with selected risk factors. - Yes; Recommended    Hepatitis C screening Finding hepatitis C early can reduce complications.  Screening is recommended for all persons born from 1945 through 1965 and for those with selected other risk factors.  - No: is not indicated today.   HIV screening Finding HIV early can reduce complications.  Screening is recommended for persons with risk factors for HIV infection. 2012 Up to date   Glaucoma screening Early detection of glaucoma can prevent blindness.   Please talk to your eye doctor about this.       SCREENING TESTS     Description   Year of Last Screening   Recommended Today?   Colorectal cancer screening  Fecal occult blood test   Screening colonoscopy Screening for colon cancer has been shown to reduce death from colon cancer by 25-30%. Screening recommended to start at 50 years and continuing until  age 75 years.   - No: is not indicated today.   Breast Cancer Screening (women)  Mammogram Mammogram screening for breast cancer has been shown to reduce the risk of dying from breast cancer and prolong life. Screening is recommended every 1-2 years for women aged 50 to 74 years.  3/23 No; is up to date.   Cervical Cancer screening (women)  Pap Cervical pap smears can reduce cervical cancer. Screening is recommended annually if high risk (history of abnormal pap smears) otherwise every 2-3 years, stop screening at 65 years of age if history of normal paps. - No: is not indicated today.   Screening for Osteoporosis:  Bone mass measurements (women)  Dexa Scan Screening and treating Osteoporosis can reduce the risk of hip and spine fractures. Screening is recommended in women 65 years or older and in women and men at risk of osteoporosis. 9/22 Up to date   Screening for Lung Cancer   Low-dose CT scanning Screening can reduce mortality in persons aged 55-80 who have smoked at least 30 pack-years and who are either still smoking or have quit in the past 15 years. - No: is not indicated today.   Abdominal Aortic Aneurysm (AAA) screening  Ultrasound (US)   An aneurysm treated before rupture is very safe -a ruptured aneurysm can be fatal.  Screening  by US for AAA is limited to patients who meet one of the following criteria:  Men who are 65-75 years old and have smoked more than 100 cigarettes in their lifetime  Anyone with a family history of abdominal aortic aneurysm - No: is not indicated today.     Here are your recommended immunizations.  Take this home for your reference.                                                    IMMUNIZATIONS Description Recommend today?     Influenza (Flu shot) Prevents flu; should get every year Yes; Recommended    PCV 13 Pneumonia vaccination; you get it once Up to date   PPSV 23 Second pneumonia vaccination; usually get it 1 year after PCV 13 Up to date   Zoster (Shingles) Prevents  shingles; you get it once  (Check with Part D insurance for coverage, must receive at a pharmacy, not clinic) Up to date   Tetanus Prevents tetanus; once every 10 years Yes; Recommended      Hepatitis B  If you have any of the following risk factors you should be immunized for hepatitis B: severe kidney disease, people who live in the same house as a carrier of Hepatitis B virus, people who live in  institutions (e.g. nursing homes or group homes), homosexual men, patients with hemophilia who received Factor VIII or IX concentrates, abusers of illicit injectable drugs Yes; Recommended       PATIENT INSTRUCTIONS    Yearly exam:   See your health care provider every year in order to review changes in your health, review medicines that you take, and discuss preventive care needs such as immunizations and cancer screening.  Get a flu shot each year.     Advance Directives:  If you have not done so, you are encouraged to complete advance directives and/or a living will.   More information about advance directives can be found at: http://www.mnmed.org/advocacy/Key-Issues/Advance-Directives    Nutrition:   Eat at least 5 servings of fruits and vegetables each day.   Eat whole-grain bread, whole-wheat pasta and brown rice instead of white grains and rice.   Talk to your doctor about Calcium and Vitamin D.     Lifestyle:  Exercise for at least 150 minutes a week (30 minutes a day, 5 days a week). This will help you control your weight and prevent disease.   Limit alcohol to one drink per day.   If you smoke, try to quit - your doctor will be happy to help.   Wear sunscreen to prevent skin cancer.   See your dentist every six months for an exam and cleaning.   See your eye doctor every 1 to 2 years to screen for conditions such as glaucoma, macular degeneration and cataracts.                            Patient Education   Personalized Prevention Plan  You are due for the preventive services outlined below.  Your care team is  available to assist you in scheduling these services.  If you have already completed any of these items, please share that information with your care team to update in your medical record.  Health Maintenance Due   Topic Date Due     COPD Action Plan  Never done     Annual Wellness Visit  Never done     Cholesterol Lab  08/08/2023     Zoster (Shingles) Vaccine (2 of 2) 10/11/2022     Flu Vaccine (1) 09/01/2023
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
## 2023-08-28 NOTE — PROGRESS NOTES
Preceptor Attestation:  Patient's case reviewed and discussed with the resident, Tito Bates MD, and I personally evaluated the patient. I agree with written assessment and plan of care.    Supervising Physician:  Shahrzad Serrano MD   [facility]

## 2023-08-28 NOTE — PROGRESS NOTES
"Answers submitted by the patient for this visit:  Annual Preventive Visit (Submitted on 8/25/2023)  Chief Complaint: Annual Exam:  In general, how would you rate your overall physical health?: good  Frequency of exercise:: 4-5 days/week  Do you usually eat at least 4 servings of fruit and vegetables a day, include whole grains & fiber, and avoid regularly eating high fat or \"junk\" foods? : No  Taking medications regularly:: Yes  Medication side effects:: Not applicable  Activities of Daily Living: transportation requires assistance, shopping requires assistance, preparing meals requires assistance, housework requires assistance, bathing requires assistance  Home safety: no safety concerns identified  Hearing Impairment:: difficulty following a conversation in a noisy restaurant or crowded room, feel that people are mumbling or not speaking clearly, difficulty following dialogue in the theater, need to ask people to speak up or repeat themselves, difficulty understanding soft or whispered speech, difficulty understanding speech on the telephone  In the past 6 months, have you been bothered by leaking of urine?: No  abdominal pain: No  Blood in stool: No  Blood in urine: No  chest pain: No  chills: No  congestion: No  constipation: No  cough: No  diarrhea: No  dizziness: No  ear pain: No  eye pain: No  nervous/anxious: No  fever: No  frequency: No  genital sores: No  headaches: No  hearing loss: No  heartburn: No  arthralgias: No  joint swelling: No  peripheral edema: No  mood changes: No  myalgias: No  nausea: No  dysuria: No  palpitations: No  Skin sensation changes: No  sore throat: No  urgency: No  rash: No  shortness of breath: Yes  visual disturbance: Yes  weakness: Yes  pelvic pain: No  vaginal bleeding: No  vaginal discharge: No  tenderness: No  breast mass: No  breast discharge: No  In general, how would you rate your overall mental or emotional health?: good  Additional concerns today:: No  Exercise outside " of work (Submitted on 8/25/2023)  Chief Complaint: Annual Exam:  Duration of exercise:: 30-45 minutes

## 2023-09-13 NOTE — PROGRESS NOTES
Pulmonary Clinic Follow-Up          Assessment/Plan:     Rhett Alvarado is a 78 year old non-smoker with history of asthma/COPD, chronic mediastinitis, lymphoma in remission, HTN, presenting today for 3 month follow-up.    Severe persistent asthma  Lifelong non-smoker.  Moderately severe obstructive lung disease (FEV1 55% predicted), with air-trapping and normal diffusion capacity.  She does not meet criteria for improvement in spirometry with bronchodilator per ATS guidelines, but there is noted improvement during mid-flow rates.  No emphysema per lung imaging.  Her main symptoms are wheezing and she has significant response to prednisone, so patient appears to have more reactive airways/asthma type picture instead of COPD.  She has had an exacerbation and hospitalization, with LLL opacities noted per chest CT.  Has been treated with multiple prednisone and antibiotic courses.  Last visit her regimen was increased from Stiolto to Trelegy Ellipta.  Prior to this she was only on Spiriva.  She reports ongoing wheezing and GUAJARDO, recent use of predisone and feels much improved today.  She is requesting to go back to previous inhaler as she does not like to rinse her mouth after use.  LS CTA today, sats 96% on RA.  Plan:  - repeat chest CT to follow-up on opacities from July, call daughter with results.  - continue Trelegy Ellipta (fluticasone/umeclidinium/vilanterol) 200/62.5/25, one inhalation daily, rinse/gargle after use.  Discussion today about how I feel she needs an ICS as part of her maintenance regimen, and that I would prefer she stay on Trelegy for the time being.  She is agreeable and will continue rinsing mouth or drinking a glass of water after use.  - continue Albuterol HFA or Duonebs PRN.   - if patient continues to request going off Trelegy and back to LAMA/LABA, could consider repeat PFTs, methacholine challenge test, Niox in clinic next visit, to determine official diagnosis - as she was previously  diagnosed with COPD.  - she is UTD with COVID vaccines and booster, annual influenza vaccine, and pneumococcal vaccine.  - Action plan: prednisone 40mg x7 days + azithromycin x5 days.    Follow-up  - 6 months    Arlyn Nguyen CNP  Pulmonary Medicine  [facility]  [phone]       CC:     Asthma follow-up     HPI:       Rhett Alvarado is a 78 year old non-smoker with history of asthma/COPD, chronic mediastinitis, lymphoma in remission, HTN, presenting today for 3 month follow-up.    Since last visit (7/19/23), she does not report much improvement in her breathing since switching to Trelegy.  She does not like having to rinse her mouth.  Unclear how much she is using this.  She does use Albuterol daily for wheezing.  She just finished course of prednisone through PCP and feels much improved.    Patient supplied answers from flow sheet for:  COPD Assessment Test (CAT)  2009 [employer]. All rights reserved.      2/14/2022    12:00 PM 6/2/2022     3:00 PM 6/20/2022     4:00 PM 8/8/2022     2:00 PM 12/20/2022     9:00 AM 7/19/2023     7:00 AM 9/14/2023    10:00 AM   COPD assessment test (CAT)   Cough 1 0 2 0 0 0 0   Phlegm 1 0 1 0 0 0 0   Chest tightness 3 0 3 1 0 0 0   Walk up hill 3 2 3 1 3 5 4   Limited activities 0 0 4 2 0 5 0   Leaving my home 2 3 4 2 0 4 0   Sleep 4 0 3 0 0 3 5   Energy 5 4 4 3 0 5 5   Total Score 19 9 24 9 3 22 14      CAT Key:  The CAT consist of 8 items which are each scored 0-5. The total score ranges from 0-40 with higher scores representing a poorer health status. When interpreting CAT scores, the individual s disease severity should be considered.   Low impact  (1-9)  Medium impact  (10-20)  High impact  (21-30)  Very high impact  (31-40)       ROS:     10-point ROS performed and is negative aside from those listed in HPI.     Medical history:     Past Medical History:   Diagnosis Date    Constipation     Created by Conversion Replacement Utility updated for latest  IMO load     E. coli UTI 2/10/2022    Malignant lymphoma, large cell, diffuse (H) 2/25/2022    Palpitations     Created by Conversion      Past Surgical History:   Procedure Laterality Date    IR MISCELLANEOUS PROCEDURE  2/20/2012    IR PORT REMOVAL  7/20/2012     Social History     Tobacco Use    Smoking status: Never     Passive exposure: Yes    Smokeless tobacco: Never   Vaping Use    Vaping Use: Never used   Substance Use Topics    Alcohol use: No     Current Outpatient Medications   Medication    albuterol (PROAIR HFA/PROVENTIL HFA/VENTOLIN HFA) 108 (90 Base) MCG/ACT inhaler    amLODIPine (NORVASC) 5 MG tablet    aspirin 81 MG EC tablet    atorvastatin (LIPITOR) 40 MG tablet    brimonidine (ALPHAGAN) 0.2 % ophthalmic solution    carboxymethylcellulose PF (REFRESH LIQUIGEL) 1 % ophthalmic gel    dorzolamide (TRUSOPT) 2 % ophthalmic solution    Fluticasone-Umeclidin-Vilanterol (TRELEGY ELLIPTA) 200-62.5-25 MCG/ACT oral inhaler    ipratropium - albuterol 0.5 mg/2.5 mg/3 mL (DUONEB) 0.5-2.5 (3) MG/3ML neb solution    latanoprost (XALATAN) 0.005 % ophthalmic solution    naproxen (NAPROSYN) 250 MG tablet     No current facility-administered medications for this visit.          Physical Exam:     /80 (BP Location: Left arm, Patient Position: Sitting, Cuff Size: Adult Regular)   Pulse 74   Wt 62.8 kg (138 lb 6.4 oz)   SpO2 96%   BMI 28.93 kg/m    Gen: elderly female , appears in NAD.  Daughter present.   HEENT: clear conjunctivae, moist mucous membranes  CV: RRR, no M/G/R  Resp: LS CTA bilaterally.  Respirations even and unlabored.  On RA.  No cough during exam.  No stridor.  Skin: no apparent rashes on visible skin  Ext: no cyanosis, clubbing or edema  Neuro: alert and answering questions appropriately       Data:       EXAM: CT CHEST PULMONARY EMBOLISM W CONTRAST  LOCATION: [facility]  DATE: 7/10/2023  FINDINGS:  ANGIOGRAM CHEST: The main pulmonary artery is 35 mm in diameter. No  PE.Thoracic aorta is negative for dissection.  LUNGS AND PLEURA: Chronic left upper lobe and right middle lobe scarring have not changed. There are new confluent opacities in the left lower lobe. A small amount of left pleural fluid is new.   MEDIASTINUM/AXILLAE: A 2.5 cm right thyroid lesion has not changed in size.  This was previously biopsied.  CORONARY ARTERY CALCIFICATION: Mild.  UPPER ABDOMEN: A simple left renal cyst does not require follow-up.   MUSCULOSKELETAL: Normal.                                                                   IMPRESSION:  1.  No PE. Enlargement of the main pulmonary artery can be seen with pulmonary hypertension.  2.  New confluent left lower lobe pulmonary opacities could be seen with infection, aspiration, and/or malignancy. Scarring in the left upper lobe and right middle lobe has not changed.       PFT's (3/16/2022):   The FEV1 and FEV1/FVC ratio are reduced.  The inspiratory flow rates are reduced.  While the vital capacity and total lung capacity are within normal limits, the RV/TLC ratio is increased.  Following administration of bronchodilators, there is no   significant response.  The diffusing capacity is normal.  Maldistribution of ventilation is indicated by the difference between the alveolar volume and the total lung capacity.   IMPRESSION:   Severe  airway obstruction. No significant post bronchodialtor response.  A clinical trial of bronchodilators may be beneficial in view of the airway obstruction.   Lung volumes showed mild air trapping.   Normal diffusion capacity.

## 2023-09-14 ENCOUNTER — OFFICE VISIT (OUTPATIENT)
Dept: PULMONOLOGY | Facility: CLINIC | Age: 78
End: 2023-09-14
Attending: NURSE PRACTITIONER
Payer: COMMERCIAL

## 2023-09-14 VITALS
HEART RATE: 74 BPM | BODY MASS INDEX: 28.93 KG/M2 | DIASTOLIC BLOOD PRESSURE: 80 MMHG | WEIGHT: 138.4 LBS | OXYGEN SATURATION: 96 % | SYSTOLIC BLOOD PRESSURE: 126 MMHG

## 2023-09-14 DIAGNOSIS — J45.50 SEVERE PERSISTENT ASTHMA WITHOUT COMPLICATION (H): Primary | ICD-10-CM

## 2023-09-14 DIAGNOSIS — J44.9 CHRONIC OBSTRUCTIVE PULMONARY DISEASE, UNSPECIFIED COPD TYPE (H): ICD-10-CM

## 2023-09-14 PROCEDURE — 99214 OFFICE O/P EST MOD 30 MIN: CPT | Performed by: NURSE PRACTITIONER

## 2023-09-14 RX ORDER — ALBUTEROL SULFATE 90 UG/1
2 AEROSOL, METERED RESPIRATORY (INHALATION) EVERY 6 HOURS PRN
Qty: 18 G | Refills: 3 | Status: SHIPPED | OUTPATIENT
Start: 2023-09-14

## 2023-09-15 ENCOUNTER — HOSPITAL ENCOUNTER (OUTPATIENT)
Dept: CT IMAGING | Facility: HOSPITAL | Age: 78
Discharge: HOME | End: 2023-09-15
Attending: NURSE PRACTITIONER | Admitting: NURSE PRACTITIONER
Payer: COMMERCIAL

## 2023-09-15 DIAGNOSIS — J45.50 SEVERE PERSISTENT ASTHMA WITHOUT COMPLICATION (H): ICD-10-CM

## 2023-09-15 PROCEDURE — 71250 CT THORAX DX C-: CPT

## 2023-09-27 ENCOUNTER — OFFICE VISIT (OUTPATIENT)
Dept: FAMILY MEDICINE | Facility: CLINIC | Age: 78
End: 2023-09-27
Payer: COMMERCIAL

## 2023-09-27 DIAGNOSIS — L91.0 KELOID SCAR: Primary | ICD-10-CM

## 2023-09-27 PROCEDURE — 11901 INJECT SKIN LESIONS >7: CPT | Performed by: NURSE PRACTITIONER

## 2023-09-27 RX ORDER — TRIAMCINOLONE ACETONIDE 40 MG/ML
1.3 INJECTION, SUSPENSION INTRA-ARTICULAR; INTRAMUSCULAR ONCE
Status: COMPLETED | OUTPATIENT
Start: 2023-09-27 | End: 2023-09-27

## 2023-09-27 RX ADMIN — TRIAMCINOLONE ACETONIDE 52 MG: 40 INJECTION, SUSPENSION INTRA-ARTICULAR; INTRAMUSCULAR at 16:18

## 2023-09-27 ASSESSMENT — PAIN SCALES - GENERAL: PAINLEVEL: NO PAIN (0)
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
## 2023-10-02 DIAGNOSIS — I10 ESSENTIAL HYPERTENSION, BENIGN: ICD-10-CM

## 2023-10-02 DIAGNOSIS — E78.5 HYPERLIPIDEMIA, UNSPECIFIED HYPERLIPIDEMIA TYPE: ICD-10-CM

## 2023-10-02 RX ORDER — ATORVASTATIN CALCIUM 40 MG/1
40 TABLET, FILM COATED ORAL DAILY
Qty: 90 TABLET | Refills: 4 | Status: SHIPPED | OUTPATIENT
Start: 2023-10-02

## 2023-10-02 RX ORDER — AMLODIPINE BESYLATE 5 MG/1
5 TABLET ORAL DAILY
Qty: 90 TABLET | Refills: 4 | Status: SHIPPED | OUTPATIENT
Start: 2023-10-02

## 2023-10-02 NOTE — TELEPHONE ENCOUNTER
[facility] phone call message- medication clarification/question:    Full Medication Name: amLODIPine (NORVASC) 5 MG tablet   atorvastatin (LIPITOR) 40 MG tablet     Question: patient requesting for refill on medications listed above. Patient stated she will be visiting [place] on 10/7/2023 so if she can get refills before she leaves. Please call and advise if needed.    Pharmacy confirmed as [facility] - [place], [place] - [address]: Yes    OK to leave a message on voice mail? Yes    Primary language: Hmong      needed? Yes    Call taken on October 2, 2023 at 10:15 AM by Sushila Spivey

## 2023-10-04 NOTE — PROGRESS NOTES
"  Assessment & Plan     COPD exacerbation (H)  Complex pulmonary hx, has been following closely w/ Pulmonology. Many exacerbations in the last year, has completed multiple courses of abx and steroids for these. Per most recent pulmonology: \"Lifelong non-smoker.  Moderately severe obstructive lung disease (FEV1 55% predicted), with air-trapping and normal diffusion capacity.  She does not meet criteria for improvement in spirometry with bronchodilator per ATS guidelines, but there is noted improvement during mid-flow rates.  No emphysema per lung imaging.  Her main symptoms are wheezing and she has significant response to prednisone, so patient appears to have more reactive airways/asthma type picture instead of COPD. Last visit her regimen was increased from Stiolto to Trelegy Ellipta.  Prior to this she was only on Spiriva.  She reports ongoing wheezing and GUAJARDO, recent use of predisone and feels much improved today.  She is requesting to go back to previous inhaler as she does not like to rinse her mouth after use.  LS CTA today, sats 96% on RA.\" Today still having moderate wheezing throughout all lung fields. Will trial prednisone taper as well as starting cetirizine.   - predniSONE (DELTASONE) 20 MG tablet  Dispense: 20 tablet; Refill: 0    Seasonal allergic rhinitis, unspecified trigger  - cetirizine (ZYRTEC) 10 MG tablet  Dispense: 30 tablet; Refill: 3    BMI:   Estimated body mass index is 28.93 kg/m  as calculated from the following:    Height as of 8/25/23: 1.473 m (4' 10\").    Weight as of 9/14/23: 62.8 kg (138 lb 6.4 oz).     No follow-ups on file.    Tito Bates MD  [facility] Delaney is a 78 year old, presenting for the following health issues:  COPD (F/u) and RECHECK (CT scan)    HPI     Follow up CT scan and COPD  - Saw me 8/25 for COPD exacerbation:  Still having significant symptoms, audible wheezing from across the room, dyspnea on exertion despite " daily Trellegy use and Duonebs 3x daily. Has had several steroid bursts recently for exacerbations. Scheduled to see Pulmonology 10/20, will reach out and see if we can get her appointment moved up. Plan to follow up in clinic in 2 weeks to check in.     Saw Pulmonology 9/14:  Lifelong non-smoker.  Moderately severe obstructive lung disease (FEV1 55% predicted), with air-trapping and normal diffusion capacity.  She does not meet criteria for improvement in spirometry with bronchodilator per ATS guidelines, but there is noted improvement during mid-flow rates.  No emphysema per lung imaging.  Her main symptoms are wheezing and she has significant response to prednisone, so patient appears to have more reactive airways/asthma type picture instead of COPD.  She has had an exacerbation and hospitalization, with LLL opacities noted per chest CT.  Has been treated with multiple prednisone and antibiotic courses.  Last visit her regimen was increased from Stiolto to Trelegy Ellipta.  Prior to this she was only on Spiriva.  She reports ongoing wheezing and GUAJARDO, recent use of predisone and feels much improved today.  She is requesting to go back to previous inhaler as she does not like to rinse her mouth after use.  LS CTA today, sats 96% on RA.  Plan:  - repeat chest CT to follow-up on opacities from July, call daughter with results.  - continue Trelegy Ellipta (fluticasone/umeclidinium/vilanterol) 200/62.5/25, one inhalation daily, rinse/gargle after use.  Discussion today about how I feel she needs an ICS as part of her maintenance regimen, and that I would prefer she stay on Trelegy for the time being.  She is agreeable and will continue rinsing mouth or drinking a glass of water after use.  - continue Albuterol HFA or Duonebs PRN.   - if patient continues to request going off Trelegy and back to LAMA/LABA, could consider repeat PFTs, methacholine challenge test, Niox in clinic next visit, to determine official diagnosis  - as she was previously diagnosed with COPD.  - she is UTD with COVID vaccines and booster, annual influenza vaccine, and pneumococcal vaccine.  - Action plan: prednisone 40mg x7 days + azithromycin x5 days.  CT- 1.  Interval resolution of confluent left lower lobe airspace opacities. There are still numerous endobronchial opacities in the left lower lobe airways suggesting aspiration. 2.  Unchanged atelectasis and chronic volume loss in the middle lobe and left upper lobe. 3.  Enlarged pulmonary arteries suggesting chronic pulmonary hypertension.  Today- Planning to go out to [place] this week w/ Zoe. Plan to stay 2 weeks while  - Needs medical records to bring in case seeing doctor there  - Using inhaler 3x daily, neb 2x daily    Review of Systems   Constitutional, HEENT, cardiovascular, pulmonary, gi and gu systems are negative, except as otherwise noted.      Objective    /72   Pulse 85   Temp 97.3  F (36.3  C) (Tympanic)   SpO2 96%   There is no height or weight on file to calculate BMI.  Physical Exam   GENERAL: Tearful, sitting in wheelchair.   NECK: no adenopathy, no asymmetry, masses, or scars and thyroid normal to palpation  RESP: Diminished breath sounds throughout, moderate wheezing worsened in bilateral lower lobes. No crackles appreciated today.  CV: regular rate and rhythm, normal S1 S2, no S3 or S4, no murmur, click or rub, no peripheral edema and peripheral pulses strong  ABDOMEN: soft, nontender, no hepatosplenomegaly, no masses and bowel sounds normal  MS: no gross musculoskeletal defects noted, no edema    ----- Service Performed and Documented by Resident or Fellow ------

## 2023-10-05 ENCOUNTER — OFFICE VISIT (OUTPATIENT)
Dept: FAMILY MEDICINE | Facility: CLINIC | Age: 78
End: 2023-10-05
Payer: COMMERCIAL

## 2023-10-05 VITALS
DIASTOLIC BLOOD PRESSURE: 72 MMHG | OXYGEN SATURATION: 96 % | SYSTOLIC BLOOD PRESSURE: 108 MMHG | HEART RATE: 85 BPM | TEMPERATURE: 97.3 F

## 2023-10-05 DIAGNOSIS — J30.2 SEASONAL ALLERGIC RHINITIS, UNSPECIFIED TRIGGER: Primary | ICD-10-CM

## 2023-10-05 DIAGNOSIS — J44.1 COPD EXACERBATION (H): ICD-10-CM

## 2023-10-05 PROBLEM — A15.9 TUBERCULOSIS: Status: ACTIVE | Noted: 2022-02-24

## 2023-10-05 PROBLEM — C83.30 LYMPHOMA, LARGE-CELL, DIFFUSE (H): Status: ACTIVE | Noted: 2022-02-24

## 2023-10-05 PROCEDURE — 99214 OFFICE O/P EST MOD 30 MIN: CPT | Mod: GC

## 2023-10-05 RX ORDER — PREDNISONE 20 MG/1
TABLET ORAL
Qty: 20 TABLET | Refills: 0 | Status: SHIPPED | OUTPATIENT
Start: 2023-10-05

## 2023-10-05 RX ORDER — CETIRIZINE HYDROCHLORIDE 10 MG/1
10 TABLET ORAL DAILY
Qty: 30 TABLET | Refills: 3 | Status: SHIPPED | OUTPATIENT
Start: 2023-10-05

## 2023-10-05 NOTE — PROGRESS NOTES
Preceptor Attestation:  Patient's case reviewed and discussed with Tito Bates MD resident and I evaluated the patient. I agree with written assessment and plan of care.  Supervising Physician:  KEESHA MCKEON MD  [facility]

## 2023-10-05 NOTE — PROGRESS NOTES
"  Assessment & Plan     COPD exacerbation (H)  Complex pulmonary hx, has been following closely w/ Pulmonology. Many exacerbations in the last year, has completed multiple courses of abx and steroids for these. Per most recent pulmonology: \"Lifelong non-smoker.  Moderately severe obstructive lung disease (FEV1 55% predicted), with air-trapping and normal diffusion capacity.  She does not meet criteria for improvement in spirometry with bronchodilator per ATS guidelines, but there is noted improvement during mid-flow rates.  No emphysema per lung imaging.  Her main symptoms are wheezing and she has significant response to prednisone, so patient appears to have more reactive airways/asthma type picture instead of COPD. Last visit her regimen was increased from Stiolto to Trelegy Ellipta.  Prior to this she was only on Spiriva.  She reports ongoing wheezing and GUAJARDO, recent use of predisone and feels much improved today.  She is requesting to go back to previous inhaler as she does not like to rinse her mouth after use.  LS CTA today, sats 96% on RA.\"   Today still having moderate wheezing throughout all lung fields. Will trial prednisone taper as well as starting cetirizine.   - predniSONE (DELTASONE) 20 MG tablet  Dispense: 20 tablet; Refill: 0    Seasonal allergic rhinitis, unspecified trigger  - cetirizine (ZYRTEC) 10 MG tablet  Dispense: 30 tablet; Refill: 3    BMI:   Estimated body mass index is 28.93 kg/m  as calculated from the following:    Height as of 8/25/23: 1.473 m (4' 10\").    Weight as of 9/14/23: 62.8 kg (138 lb 6.4 oz).     No follow-ups on file.    Tito Bates MD  [facility] Delaney is a 78 year old, presenting for the following health issues:  COPD (F/u) and RECHECK (CT scan)    HPI     Follow up CT scan and COPD  - Saw me 8/25 for COPD exacerbation:  Still having significant symptoms, audible wheezing from across the room, dyspnea on exertion despite " daily Trellegy use and Duonebs 3x daily. Has had several steroid bursts recently for exacerbations. Scheduled to see Pulmonology 10/20, will reach out and see if we can get her appointment moved up. Plan to follow up in clinic in 2 weeks to check in.     Saw Pulmonology 9/14:  Lifelong non-smoker.  Moderately severe obstructive lung disease (FEV1 55% predicted), with air-trapping and normal diffusion capacity.  She does not meet criteria for improvement in spirometry with bronchodilator per ATS guidelines, but there is noted improvement during mid-flow rates.  No emphysema per lung imaging.  Her main symptoms are wheezing and she has significant response to prednisone, so patient appears to have more reactive airways/asthma type picture instead of COPD.  She has had an exacerbation and hospitalization, with LLL opacities noted per chest CT.  Has been treated with multiple prednisone and antibiotic courses.  Last visit her regimen was increased from Stiolto to Trelegy Ellipta.  Prior to this she was only on Spiriva.  She reports ongoing wheezing and GUAJARDO, recent use of predisone and feels much improved today.  She is requesting to go back to previous inhaler as she does not like to rinse her mouth after use.  LS CTA today, sats 96% on RA.  Plan:  - repeat chest CT to follow-up on opacities from July, call daughter with results.  - continue Trelegy Ellipta (fluticasone/umeclidinium/vilanterol) 200/62.5/25, one inhalation daily, rinse/gargle after use.  Discussion today about how I feel she needs an ICS as part of her maintenance regimen, and that I would prefer she stay on Trelegy for the time being.  She is agreeable and will continue rinsing mouth or drinking a glass of water after use.  - continue Albuterol HFA or Duonebs PRN.   - if patient continues to request going off Trelegy and back to LAMA/LABA, could consider repeat PFTs, methacholine challenge test, Niox in clinic next visit, to determine official diagnosis  - as she was previously diagnosed with COPD.  - she is UTD with COVID vaccines and booster, annual influenza vaccine, and pneumococcal vaccine.  - Action plan: prednisone 40mg x7 days + azithromycin x5 days.  CT- 1.  Interval resolution of confluent left lower lobe airspace opacities. There are still numerous endobronchial opacities in the left lower lobe airways suggesting aspiration. 2.  Unchanged atelectasis and chronic volume loss in the middle lobe and left upper lobe. 3.  Enlarged pulmonary arteries suggesting chronic pulmonary hypertension.  Today- Planning to go out to [place] this week w/ Zoe. Plan to stay 2 weeks while  - Needs medical records to bring in case seeing doctor there  - Using inhaler 3x daily, neb 2x daily    Review of Systems         Objective    /72   Pulse 85   Temp 97.3  F (36.3  C) (Tympanic)   SpO2 96%   There is no height or weight on file to calculate BMI.  Physical Exam   GENERAL: Tearful, sitting in wheelchair.   NECK: no adenopathy, no asymmetry, masses, or scars and thyroid normal to palpation  RESP: Diminished breath sounds throughout, moderate wheezing worsened in bilateral lower lobes. No crackles appreciated today.  CV: regular rate and rhythm, normal S1 S2, no S3 or S4, no murmur, click or rub, no peripheral edema and peripheral pulses strong  ABDOMEN: soft, nontender, no hepatosplenomegaly, no masses and bowel sounds normal  MS: no gross musculoskeletal defects noted, no edema    ----- Service Performed and Documented by Resident or Fellow ------

## 2024-06-19 ENCOUNTER — TELEPHONE (OUTPATIENT)
Dept: FAMILY MEDICINE | Facility: CLINIC | Age: 79
End: 2024-06-19

## 2024-06-19 NOTE — TELEPHONE ENCOUNTER
Patients daughter called in stating that patient has been out of the state for a while but is now back and she has a Pessary that will need replacement -due on 6/26. Diagnosis is : Female Cystocele    Patient needs a referral to Urology at [facility] to be able to get this replaced -- they are in process of applying for state insurance.     Dr Cordova helped with this request if Dr Bates has any questions about placing the referral -- please do so as soon as possible so she can get in to see them soon -- thank you

## 2024-06-25 DIAGNOSIS — N81.11 CYSTOCELE, MIDLINE: Primary | ICD-10-CM

## 2024-08-16 ENCOUNTER — PATIENT OUTREACH (OUTPATIENT)
Dept: CARE COORDINATION | Facility: CLINIC | Age: 79
End: 2024-08-16

## 2024-08-16 NOTE — PROGRESS NOTES
Clinic Care Coordination Contact  Follow Up Progress Note      Assessment:   The pt was recently in the ED, I called to check up on the pt and help the pt setup a ED follow up. I called and talked to the pt daughter, she stated that her mother is in [place], [place]. She went to the ED there, when she gets home, she will call the clinic, if she needs a follow up.    Care Gaps:    Health Maintenance Due   Topic Date Due    ADVANCE CARE PLANNING  Never done    COPD ACTION PLAN  Never done    RSV VACCINE (Pregnancy & 60+) (1 - 1-dose 60+ series) Never done    ZOSTER IMMUNIZATION (2 of 2) 10/11/2022    COVID-19 Vaccine (9 - 2023-24 season) 09/01/2023    PHQ-2 (once per calendar year)  01/01/2024    BMP  07/12/2024    MICROALBUMIN  08/01/2024    LIPID  08/25/2024    FALL RISK ASSESSMENT  08/25/2024

## (undated) RX ORDER — TRIAMCINOLONE ACETONIDE 40 MG/ML
INJECTION, SUSPENSION INTRA-ARTICULAR; INTRAMUSCULAR
Status: DISPENSED
Start: 2023-05-09